# Patient Record
Sex: MALE | Race: WHITE | NOT HISPANIC OR LATINO | Employment: OTHER | URBAN - METROPOLITAN AREA
[De-identification: names, ages, dates, MRNs, and addresses within clinical notes are randomized per-mention and may not be internally consistent; named-entity substitution may affect disease eponyms.]

---

## 2017-01-26 ENCOUNTER — PATIENT OUTREACH (OUTPATIENT)
Dept: ADMINISTRATIVE | Facility: HOSPITAL | Age: 62
End: 2017-01-26

## 2017-02-09 ENCOUNTER — OFFICE VISIT (OUTPATIENT)
Dept: FAMILY MEDICINE | Facility: CLINIC | Age: 62
End: 2017-02-09
Payer: COMMERCIAL

## 2017-02-09 VITALS
HEART RATE: 69 BPM | DIASTOLIC BLOOD PRESSURE: 74 MMHG | BODY MASS INDEX: 29.5 KG/M2 | SYSTOLIC BLOOD PRESSURE: 122 MMHG | OXYGEN SATURATION: 96 % | WEIGHT: 217.81 LBS | HEIGHT: 72 IN

## 2017-02-09 DIAGNOSIS — Z00.00 ANNUAL PHYSICAL EXAM: Primary | ICD-10-CM

## 2017-02-09 DIAGNOSIS — I10 BENIGN HYPERTENSION: ICD-10-CM

## 2017-02-09 DIAGNOSIS — Z23 NEED FOR VACCINATION FOR STREP PNEUMONIAE + INFLUENZA: ICD-10-CM

## 2017-02-09 DIAGNOSIS — E66.3 OVERWEIGHT: Chronic | ICD-10-CM

## 2017-02-09 DIAGNOSIS — Z91.89 AT RISK FOR CORONARY ARTERY DISEASE: ICD-10-CM

## 2017-02-09 DIAGNOSIS — E78.00 HYPERCHOLESTEREMIA: ICD-10-CM

## 2017-02-09 DIAGNOSIS — I49.9 CARDIAC ARRHYTHMIA, UNSPECIFIED CARDIAC ARRHYTHMIA TYPE: ICD-10-CM

## 2017-02-09 DIAGNOSIS — N40.0 BENIGN NODULAR PROSTATIC HYPERPLASIA WITHOUT LOWER URINARY TRACT SYMPTOMS: ICD-10-CM

## 2017-02-09 DIAGNOSIS — N40.0 BENIGN NON-NODULAR PROSTATIC HYPERPLASIA WITHOUT LOWER URINARY TRACT SYMPTOMS: ICD-10-CM

## 2017-02-09 DIAGNOSIS — R53.83 FATIGUE, UNSPECIFIED TYPE: ICD-10-CM

## 2017-02-09 PROCEDURE — 93010 ELECTROCARDIOGRAM REPORT: CPT | Mod: S$GLB,,, | Performed by: INTERNAL MEDICINE

## 2017-02-09 PROCEDURE — 99396 PREV VISIT EST AGE 40-64: CPT | Mod: 25,S$GLB,, | Performed by: INTERNAL MEDICINE

## 2017-02-09 PROCEDURE — 3074F SYST BP LT 130 MM HG: CPT | Mod: S$GLB,,, | Performed by: INTERNAL MEDICINE

## 2017-02-09 PROCEDURE — 3078F DIAST BP <80 MM HG: CPT | Mod: S$GLB,,, | Performed by: INTERNAL MEDICINE

## 2017-02-09 PROCEDURE — 81003 URINALYSIS AUTO W/O SCOPE: CPT | Mod: PO

## 2017-02-09 PROCEDURE — 90670 PCV13 VACCINE IM: CPT | Mod: S$GLB,,, | Performed by: INTERNAL MEDICINE

## 2017-02-09 PROCEDURE — 99999 PR PBB SHADOW E&M-EST. PATIENT-LVL III: CPT | Mod: PBBFAC,,, | Performed by: INTERNAL MEDICINE

## 2017-02-09 PROCEDURE — 90471 IMMUNIZATION ADMIN: CPT | Mod: S$GLB,,, | Performed by: INTERNAL MEDICINE

## 2017-02-09 PROCEDURE — 93005 ELECTROCARDIOGRAM TRACING: CPT | Mod: S$GLB,,, | Performed by: INTERNAL MEDICINE

## 2017-02-09 RX ORDER — TAMSULOSIN HYDROCHLORIDE 0.4 MG/1
0.4 CAPSULE ORAL DAILY
Qty: 30 CAPSULE | Refills: 11 | Status: SHIPPED | OUTPATIENT
Start: 2017-02-09 | End: 2018-02-14 | Stop reason: SDUPTHER

## 2017-02-09 RX ORDER — MELATONIN 10 MG
20 CAPSULE ORAL NIGHTLY
COMMUNITY
End: 2017-09-11

## 2017-02-09 NOTE — MR AVS SNAPSHOT
[facility]  [address] [facility]  [facility] [address]  Phone: [phone]  Fax: [phone]                  Luke Borden   2017 3:30 PM   Office Visit    Description:  Male : 1955   Provider:  Austin Candelario MD   Department:  [facility]           Reason for Visit     Annual Exam           Diagnoses this Visit        Comments    Annual physical exam    -  Primary     Benign nodular prostatic hyperplasia without lower urinary tract symptoms         Hypercholesteremia         Overweight         Need for vaccination for strep pneumoniae + influenza         At risk for coronary artery disease         Cardiac arrhythmia, unspecified cardiac arrhythmia type         Benign non-nodular prostatic hyperplasia without lower urinary tract symptoms         Benign hypertension         Fatigue, unspecified type                To Do List           Future Appointments        Provider Department Dept Phone    2/10/2017 8:45 AM LAB, [place] [facility] [phone]    2/10/2017 1:30 PM Shan Alicea Jr., MD [facility] [phone]      Goals (5 Years of Data)     None       These Medications        Disp Refills Start End    tamsulosin (FLOMAX) 0.4 mg Cp24 30 capsule 11 2017     Take 1 capsule (0.4 mg total) by mouth once daily. - Oral    Pharmacy: [facility]/pharmacy #[id number] - [place] - [address] Ph #: [phone]         [facility]     [facility] Care [facility] -  Assistance  Registered nurses in the [facility] provide clinical advisement, health education, appointment booking, and other advisory services.  Call for this free service at [phone].             Medications           Message regarding Medications     Verify the changes and/or additions to your medication regime listed below are the same as discussed with your clinician today.  If any of these changes or additions are  incorrect, please notify your healthcare provider.        START taking these NEW medications        Refills    tamsulosin (FLOMAX) 0.4 mg Cp24 11    Sig: Take 1 capsule (0.4 mg total) by mouth once daily.    Class: Normal    Route: Oral           Verify that the below list of medications is an accurate representation of the medications you are currently taking.  If none reported, the list may be blank. If incorrect, please contact your healthcare provider. Carry this list with you in case of emergency.           Current Medications     atorvastatin (LIPITOR) 20 MG tablet Take 1 tablet (20 mg total) by mouth once daily.    ergocalciferol (ERGOCALCIFEROL) 50,000 unit Cap TAKE ONE TWICE WEEKLY X 8 WEEKS. THEN TAKE OTC D 3 2000 UNITS DAILY. W.B.    melatonin 10 mg Cap Take by mouth.    temazepam (RESTORIL) 15 mg Cap TAKE 1 CAPSULE BY MOUTH NIGHTLY AS NEEDED FOR SLEEP    tamsulosin (FLOMAX) 0.4 mg Cp24 Take 1 capsule (0.4 mg total) by mouth once daily.           Clinical Reference Information           Your Vitals Were     BP Pulse Height Weight SpO2 BMI    122/74 69 6' (1.829 m) 98.8 kg (217 lb 13 oz) 96% 29.54 kg/m2      Blood Pressure          Most Recent Value    BP  122/74      Allergies as of 2/9/2017     No Known Allergies      Immunizations Administered on Date of Encounter - 2/9/2017     Name Date Dose VIS Date Route    Pneumococcal Conjugate - 13 Valent 2/9/2017 0.5 mL 11/5/2015 Intramuscular      Orders Placed During Today's Visit      Normal Orders This Visit    Ambulatory consult to Cardiology     Pneumococcal Conjugate Vaccine (13 Valent) (IM)     Urinalysis     Future Labs/Procedures Expected by Expires    CBC auto differential  2/9/2017 2/9/2018    Comprehensive metabolic panel  2/9/2017 2/9/2018    Lipid panel  2/9/2017 2/9/2018    PSA, Screening  2/9/2017 2/9/2018    Testosterone  2/9/2017 2/9/2018    TSH  2/9/2017 2/9/2018    EKG 12-lead  As directed 5/10/2017    Holter monitor - 48 hour  As directed  2/9/2018    Rhythm strip  As directed 2/9/2018      Language Assistance Services     ATTENTION: Language assistance services are available, free of charge. Please call [phone].      ATENCIÓN: Si habroque guevara, tiene a girard disposición servicios gratuitos de asistencia lingüística. Llame al [phone].     CHÚ Ý: N?u b?n nói Ti?ng Vi?t, có các d?ch v? h? tr? ngôn ng? mi?n phí dành cho b?n. G?i s? [phone].         [facility] complies with applicable Federal civil rights laws and does not discriminate on the basis of race, color, national origin, age, disability, or sex.

## 2017-02-10 ENCOUNTER — OFFICE VISIT (OUTPATIENT)
Dept: CARDIOLOGY | Facility: CLINIC | Age: 62
End: 2017-02-10
Payer: COMMERCIAL

## 2017-02-10 ENCOUNTER — LAB VISIT (OUTPATIENT)
Dept: LAB | Facility: HOSPITAL | Age: 62
End: 2017-02-10
Attending: INTERNAL MEDICINE
Payer: COMMERCIAL

## 2017-02-10 ENCOUNTER — TELEPHONE (OUTPATIENT)
Dept: FAMILY MEDICINE | Facility: CLINIC | Age: 62
End: 2017-02-10

## 2017-02-10 VITALS
DIASTOLIC BLOOD PRESSURE: 84 MMHG | BODY MASS INDEX: 28.9 KG/M2 | HEIGHT: 72 IN | SYSTOLIC BLOOD PRESSURE: 123 MMHG | HEART RATE: 103 BPM | WEIGHT: 213.38 LBS

## 2017-02-10 DIAGNOSIS — I10 BENIGN HYPERTENSION: ICD-10-CM

## 2017-02-10 DIAGNOSIS — E55.9 VITAMIN D DEFICIENCY: ICD-10-CM

## 2017-02-10 DIAGNOSIS — R53.83 FATIGUE, UNSPECIFIED TYPE: ICD-10-CM

## 2017-02-10 DIAGNOSIS — E55.9 VITAMIN D DEFICIENCY: Primary | ICD-10-CM

## 2017-02-10 DIAGNOSIS — I49.1 PAC (PREMATURE ATRIAL CONTRACTION): ICD-10-CM

## 2017-02-10 DIAGNOSIS — E78.00 HYPERCHOLESTEREMIA: ICD-10-CM

## 2017-02-10 DIAGNOSIS — R53.83 FATIGUE, UNSPECIFIED TYPE: Primary | Chronic | ICD-10-CM

## 2017-02-10 DIAGNOSIS — Z00.00 ANNUAL PHYSICAL EXAM: ICD-10-CM

## 2017-02-10 LAB
ALBUMIN SERPL BCP-MCNC: 3.8 G/DL
ALP SERPL-CCNC: 74 U/L
ALT SERPL W/O P-5'-P-CCNC: 22 U/L
ANION GAP SERPL CALC-SCNC: 8 MMOL/L
AST SERPL-CCNC: 25 U/L
BASOPHILS # BLD AUTO: 0.04 K/UL
BASOPHILS NFR BLD: 0.4 %
BILIRUB SERPL-MCNC: 0.9 MG/DL
BILIRUB UR QL STRIP: NEGATIVE
BUN SERPL-MCNC: 20 MG/DL
CALCIUM SERPL-MCNC: 9.6 MG/DL
CHLORIDE SERPL-SCNC: 105 MMOL/L
CHOLEST/HDLC SERPL: 2.5 {RATIO}
CLARITY UR: CLEAR
CO2 SERPL-SCNC: 27 MMOL/L
COLOR UR: YELLOW
COMPLEXED PSA SERPL-MCNC: 1.3 NG/ML
CREAT SERPL-MCNC: 1.1 MG/DL
DIFFERENTIAL METHOD: ABNORMAL
EOSINOPHIL # BLD AUTO: 0.4 K/UL
EOSINOPHIL NFR BLD: 3.9 %
ERYTHROCYTE [DISTWIDTH] IN BLOOD BY AUTOMATED COUNT: 13.3 %
EST. GFR  (AFRICAN AMERICAN): >60 ML/MIN/1.73 M^2
EST. GFR  (NON AFRICAN AMERICAN): >60 ML/MIN/1.73 M^2
GLUCOSE SERPL-MCNC: 99 MG/DL
GLUCOSE UR QL STRIP: NEGATIVE
HCT VFR BLD AUTO: 41.9 %
HDL/CHOLESTEROL RATIO: 39.6 %
HDLC SERPL-MCNC: 169 MG/DL
HDLC SERPL-MCNC: 67 MG/DL
HGB BLD-MCNC: 14 G/DL
HGB UR QL STRIP: ABNORMAL
KETONES UR QL STRIP: ABNORMAL
LDLC SERPL CALC-MCNC: 85.4 MG/DL
LEUKOCYTE ESTERASE UR QL STRIP: NEGATIVE
LYMPHOCYTES # BLD AUTO: 1.3 K/UL
LYMPHOCYTES NFR BLD: 15 %
MCH RBC QN AUTO: 32.9 PG
MCHC RBC AUTO-ENTMCNC: 33.4 %
MCV RBC AUTO: 98 FL
MONOCYTES # BLD AUTO: 1 K/UL
MONOCYTES NFR BLD: 11.1 %
NEUTROPHILS # BLD AUTO: 6.2 K/UL
NEUTROPHILS NFR BLD: 69.5 %
NITRITE UR QL STRIP: NEGATIVE
NONHDLC SERPL-MCNC: 102 MG/DL
PH UR STRIP: 6 [PH] (ref 5–8)
PLATELET # BLD AUTO: 268 K/UL
PMV BLD AUTO: 11.6 FL
POTASSIUM SERPL-SCNC: 4.5 MMOL/L
PROT SERPL-MCNC: 7 G/DL
PROT UR QL STRIP: NEGATIVE
RBC # BLD AUTO: 4.26 M/UL
SODIUM SERPL-SCNC: 140 MMOL/L
SP GR UR STRIP: 1.02 (ref 1–1.03)
TESTOST SERPL-MCNC: 480 NG/DL
TRIGL SERPL-MCNC: 83 MG/DL
TSH SERPL DL<=0.005 MIU/L-ACNC: 1.5 UIU/ML
URN SPEC COLLECT METH UR: ABNORMAL
WBC # BLD AUTO: 8.89 K/UL

## 2017-02-10 PROCEDURE — 3079F DIAST BP 80-89 MM HG: CPT | Mod: S$GLB,,, | Performed by: INTERNAL MEDICINE

## 2017-02-10 PROCEDURE — 85025 COMPLETE CBC W/AUTO DIFF WBC: CPT

## 2017-02-10 PROCEDURE — 99204 OFFICE O/P NEW MOD 45 MIN: CPT | Mod: S$GLB,,, | Performed by: INTERNAL MEDICINE

## 2017-02-10 PROCEDURE — 84443 ASSAY THYROID STIM HORMONE: CPT

## 2017-02-10 PROCEDURE — 84153 ASSAY OF PSA TOTAL: CPT

## 2017-02-10 PROCEDURE — 80061 LIPID PANEL: CPT

## 2017-02-10 PROCEDURE — 36415 COLL VENOUS BLD VENIPUNCTURE: CPT | Mod: PO

## 2017-02-10 PROCEDURE — 3074F SYST BP LT 130 MM HG: CPT | Mod: S$GLB,,, | Performed by: INTERNAL MEDICINE

## 2017-02-10 PROCEDURE — 80053 COMPREHEN METABOLIC PANEL: CPT

## 2017-02-10 PROCEDURE — 99999 PR PBB SHADOW E&M-EST. PATIENT-LVL III: CPT | Mod: PBBFAC,,, | Performed by: INTERNAL MEDICINE

## 2017-02-10 PROCEDURE — 84403 ASSAY OF TOTAL TESTOSTERONE: CPT

## 2017-02-10 RX ORDER — METOPROLOL SUCCINATE 25 MG/1
50 TABLET, EXTENDED RELEASE ORAL DAILY
Qty: 90 TABLET | Refills: 3 | Status: SHIPPED | OUTPATIENT
Start: 2017-02-10 | End: 2017-03-14 | Stop reason: SDUPTHER

## 2017-02-10 NOTE — TELEPHONE ENCOUNTER
----- Message from Caroline Martin sent at 2/9/2017  7:03 PM CST -----  Regarding: lab orders for 02-  THis patient saw Dr Candelario on 02- and said he is to have a Vitamin D test added to his lab work. He is scheduled for tomorrow morning 02-. He stated that Dr Candelario told him he would order it.

## 2017-02-10 NOTE — LETTER
February 10, 2017      Austin Candelario MD  [address] [place] [address]           [facility]  [address] [place] [address]  Phone: [phone]          Patient: Luke Borden   MR Number: [id number]   YOB: 1955   Date of Visit: 2/10/2017       Dear Dr. Austin Candelario:    Thank you for referring Luke Borden to me for evaluation. Attached you will find relevant portions of my assessment and plan of care.    If you have questions, please do not hesitate to call me. I look forward to following Luke Borden along with you.    Sincerely,    Shan Alicea Jr., MD    Enclosure  CC:  No Recipients    If you would like to receive this communication electronically, please contact [email or web address] or [phone] to request more information on [facility] access.    For providers and/or their staff who would like to refer a patient to [facility], please contact us through our one-stop-shop provider referral line, [facility], at [phone].    If you feel you have received this communication in error or would no longer like to receive these types of communications, please e-mail [email or web address]

## 2017-02-10 NOTE — PROGRESS NOTES
Subjective:    Patient ID:  Luke Borden is a 61 y.o. male who presents for evaluation of Consult (Ref by Dr. Candelario - Abnormal EKG  )      HPI61 yo WM with no known heart disease who on routine exam found to have irregular heart beat. EKG shows frequent PAC's. Other than some fatigue he has no symptoms. Denies chest pain, SOB, or edema Denies palpitations, weak spells, and syncope Exercises regularly.    Review of Systems   Constitution: Positive for malaise/fatigue. Negative for decreased appetite, fever, weakness, weight gain and weight loss.   HENT: Negative for headaches, hearing loss and nosebleeds.    Eyes: Negative for visual disturbance.   Cardiovascular: Negative for chest pain, claudication, cyanosis, dyspnea on exertion, irregular heartbeat, leg swelling, near-syncope, orthopnea, palpitations, paroxysmal nocturnal dyspnea and syncope.   Respiratory: Negative for cough, hemoptysis, shortness of breath, sleep disturbances due to breathing, snoring and wheezing.    Endocrine: Negative for cold intolerance, heat intolerance, polydipsia and polyuria.   Hematologic/Lymphatic: Negative for adenopathy and bleeding problem. Does not bruise/bleed easily.   Skin: Negative for color change, itching, poor wound healing, rash and suspicious lesions.   Musculoskeletal: Negative for arthritis, back pain, falls, joint pain, joint swelling, muscle cramps, muscle weakness and myalgias.   Gastrointestinal: Negative for bloating, abdominal pain, change in bowel habit, constipation, flatus, heartburn, hematemesis, hematochezia, hemorrhoids, jaundice, melena, nausea and vomiting.   Genitourinary: Negative for bladder incontinence, decreased libido, frequency, hematuria, hesitancy and urgency.   Neurological: Negative for brief paralysis, difficulty with concentration, excessive daytime sleepiness, dizziness, focal weakness, light-headedness, loss of balance, numbness and vertigo.   Psychiatric/Behavioral: Negative for  altered mental status, depression and memory loss. The patient does not have insomnia and is not nervous/anxious.    Allergic/Immunologic: Negative for environmental allergies, hives and persistent infections.        Objective:    Physical Exam   Constitutional: He is oriented to person, place, and time. He appears well-developed and well-nourished. No distress.   /84 (BP Location: Right arm, Patient Position: Sitting, BP Method: Automatic)  Pulse 103  Ht 6' (1.829 m)  Wt 96.8 kg (213 lb 6.5 oz)  BMI 28.94 kg/m2     HENT:   Head: Normocephalic and atraumatic.   Eyes: Conjunctivae and lids are normal. Pupils are equal, round, and reactive to light. Right eye exhibits no discharge. No scleral icterus.   Neck: Normal range of motion. Neck supple. No JVD present. No tracheal deviation present. No thyromegaly present.   Cardiovascular: Normal rate, S1 normal, S2 normal, normal heart sounds and intact distal pulses.  A regularly irregular rhythm present. Frequent extrasystoles are present. Exam reveals no gallop and no friction rub.    No murmur heard.  Pulses:       Carotid pulses are 2+ on the right side, and 2+ on the left side.       Radial pulses are 2+ on the right side, and 2+ on the left side.        Femoral pulses are 2+ on the right side, and 2+ on the left side.       Popliteal pulses are 2+ on the right side, and 2+ on the left side.        Dorsalis pedis pulses are 2+ on the right side, and 2+ on the left side.        Posterior tibial pulses are 2+ on the right side, and 2+ on the left side.   Pulmonary/Chest: Effort normal and breath sounds normal. No respiratory distress. He has no wheezes. He has no rales. He exhibits no tenderness.   Abdominal: Soft. Bowel sounds are normal. He exhibits no distension and no mass. There is no hepatosplenomegaly or hepatomegaly. There is no tenderness. There is no rebound and no guarding.   Musculoskeletal: Normal range of motion. He exhibits no edema or tenderness.    Lymphadenopathy:     He has no cervical adenopathy.   Neurological: He is alert and oriented to person, place, and time. He has normal reflexes. No cranial nerve deficit. Coordination normal.   Skin: Skin is warm and dry. No rash noted. He is not diaphoretic. No erythema. No pallor.   Psychiatric: He has a normal mood and affect. His speech is normal and behavior is normal. Judgment and thought content normal. Cognition and memory are normal.       Patient had 0 CACS in the past few years.  Assessment:       1. Fatigue, unspecified type    2. Hypercholesteremia    3. PAC (premature atrial contraction)         Plan:    Although asymptomatic because of the frequency of the PAC's will add metoprolol 25mg daily and check echo  Orders Placed This Encounter   Procedures    2D echo with color flow doppler     Return in about 3 months (around 5/10/2017).

## 2017-02-10 NOTE — PROGRESS NOTES
A 61-year-old white male who presents today for his routine annual examination   and evaluation.  For continuity and review, see previous office visit on   02/23/2016.    During the interim, the patient has had no ER visits or hospitalizations.  He   was relatively asymptomatic and has lost in the recent past, a significant   amount of weight and on careful evaluation and review since 02/03/2016, has   gained approximately 20 pounds back.  Precautions and counseling given again.    On careful review of his previous problems, indicate no significant change in   symptoms.  He is adhering to a lipid-lowering diet and lipid-lowering agents.    He is due his comprehensive lab, which will be ordered today fasting.  On   careful review from last year, his lipid panel appear to be acceptable and   responsive to his diet and medication with a TC of 182, TG 82, his HDL was 71   and LDL was 94.6.  We will reevaluate again and be in contact regarding the   results.    Sleep disorder.  The patient takes on majority of the times, Restoril 50 mg at   bedtime and marked improvement in quality of life and prevention of daytime   drowsiness, weakness and lethargy at times.  Does not exercise on a regular   basis and outlined again aerobic and light weight program again for consistent   conditioning and prevention of fall now and in the future.    REVIEW OF SYSTEMS:  GENERAL:  A complete review of systems otherwise unremarkable.  There has been   no evidence of any problems of fever, chills or sweats.  HEENT:  The patient has had his eye exam on a routine basis with Dr. Lukas Santillan, his ophthalmologist outside of the clinic and no complications or   problems noted.  RESPIRATORY:  No wheeze, cough or shortness of breath.  HEART:  No angina or pain.  No palpitation, weakness or syncope or edema.  GASTROINTESTINAL:  Appetite good.  BMs are normal.  GENITOURINARY:  No dysuria, hematuria or renal colic.  NEUROLOGICAL:  No evidence of  altered mental state, memory problems, motor or   sensory abnormalities, gait problems, falls, etc. or movement disorder or   depression by careful history.    IMMUNIZATIONS:  Reviewed.  The patient has had his flu shot and zoster outside   of the clinic and is due his pneumococcal conjugate 13 and given today prior to   departure.    PHYSICAL EXAMINATION:  GENERAL:  Reveals a well-developed, well-nourished overweight white male.  VITAL SIGNS:  See vital signs and measurements.  ARTERIES AND VEINS:  Good peripheral pulsation.  Normal carotid upstroke and   amplitude.  There was no edema or phlebitis.  SKIN:  Warm and dry.  No lesions or exanthem.  NODES:  None felt in and around the neck, submandibular or supraclavicular   region.  NEUROLOGICAL:  The patient is oriented, cooperative.  No evidence of a mood or   movement disorder.  No blunted affect and normal thought processes.  Motor tone   and strength fairly good and equal bilaterally.  Cranial nerves grossly intact   II-XII.  Gait normal.  No fall risk observed.  HEENT:  Pupils are equal and reactive to light and accommodation.  External eye   movements are intact.  NECK:  No masses or thyroid.  LUNGS:  Clear.  Distant tones.  HEART:  There was a regular irregular rhythm type noted at the present time and   again relatively asymptomatic.  No evidence of cardiomegaly, S3 or further   abnormality.  The patient apparently has had no significant arrhythmias in the   past.  ABDOMINAL:  Soft.  No localized pain, tenderness or organomegaly.  BACK:  No paravertebral or vertebral pain.    IMPRESSION:  At this time:  1.  Cardiac arrhythmia.  EKG and rhythm strip indicated probably   supraventricular tachycardia with a possible AV block.  Precautions given at the   present time and explained his present condition as stable, but requires   further cardiac evaluation, which a consultation was obtained tomorrow and the   EKG and rhythm strip was sent for Cardiology to review  prior to the consult.    Precautions to call if any cardiac symptoms occur, which have not been noted by   the patient.  2.  The patient has a sleep disorder, stable and will continue with his gradual   exercise activity program and try to obtain his sleep medicine on a p.r.n.   basis.  3.  Overweight state.  Strict precautions given again regarding his recent   weight gain and for his wellness and preventive measures in the future to   continue to monitor every day and reduce his BMI around 25.  4.  Hypercholesterolemia.  We will repeat again his lipid panel with   comprehensive lab to reevaluate his response to diet and medication.  5.  New complaints indicate the patient has nocturia x3 to 4 and definitely   interfering with quality of life and sleep patterns and we will definitely order   his PSA.  No dysuria, hematuria or evidence of any urinary symptoms and will   start on Flomax 0.4 mg and obtain response.    Prior to departure, he received his Prevnar 13, ordered comprehensive lab and   cardiac consultation as noted above for tomorrow.  Will return in 6 weeks and   determine his response to Flomax, cardiac consultation and recommendations and   review of all of his laboratory work.  The patient's visit was approximately   45-50 minutes, greater than one-half in counseling, direction, explanation of   his relatively new asymptomatic cardiac arrhythmia and need for cardiac   consultation and ordering and monitoring his condition for a brief period of   time.      KEKE/JACQUI  dd: 02/10/2017 12:28:30 (CST)  td: 02/10/2017 13:40:07 (CST)  Doc ID   #[id number]  Job ID #[id number]    CC:    This office note has been dictated.

## 2017-02-10 NOTE — PATIENT INSTRUCTIONS
Reviewed recent lab,discussed  risk factors, and recommended to continue his wellness program, exercises, diet, meds, and  wt. Control. See dictation.[email or web address]

## 2017-02-13 ENCOUNTER — TELEPHONE (OUTPATIENT)
Dept: FAMILY MEDICINE | Facility: CLINIC | Age: 62
End: 2017-02-13

## 2017-02-13 NOTE — TELEPHONE ENCOUNTER
----- Message from Austin Candelario MD sent at 2/13/2017 12:26 PM CST -----  LAB REVIEWED AND ACCEPTABLE. [place]

## 2017-02-13 NOTE — TELEPHONE ENCOUNTER
Phoned pt in regards to lab results per Dr Candelario's instructions. Pt voiced understanding for all instructions. CLC

## 2017-02-15 ENCOUNTER — TELEPHONE (OUTPATIENT)
Dept: FAMILY MEDICINE | Facility: CLINIC | Age: 62
End: 2017-02-15

## 2017-02-15 DIAGNOSIS — R31.9 HEMATURIA: Primary | ICD-10-CM

## 2017-02-15 NOTE — TELEPHONE ENCOUNTER
Message reviewed.  Contact the patient and tell him his not unusual usual to detect ketones when his fasting are exercise fatigue experienced.  Also, there a minimal blood is seen in urine exams without symptoms.  However, as a precaution I would repeat the urine with microscopic exam.  WB

## 2017-02-15 NOTE — TELEPHONE ENCOUNTER
----- Message from Yun Andrade sent at 2/15/2017  2:01 PM CST -----  Asking to discuss test results / call pt at [phone]

## 2017-02-16 ENCOUNTER — LAB VISIT (OUTPATIENT)
Dept: LAB | Facility: HOSPITAL | Age: 62
End: 2017-02-16
Attending: INTERNAL MEDICINE
Payer: COMMERCIAL

## 2017-02-16 ENCOUNTER — TELEPHONE (OUTPATIENT)
Dept: CARDIOLOGY | Facility: CLINIC | Age: 62
End: 2017-02-16

## 2017-02-16 ENCOUNTER — CLINICAL SUPPORT (OUTPATIENT)
Dept: CARDIOLOGY | Facility: CLINIC | Age: 62
End: 2017-02-16
Payer: COMMERCIAL

## 2017-02-16 DIAGNOSIS — I47.19 ATRIAL TACHYCARDIA: Primary | ICD-10-CM

## 2017-02-16 DIAGNOSIS — R53.83 FATIGUE, UNSPECIFIED TYPE: Chronic | ICD-10-CM

## 2017-02-16 DIAGNOSIS — E78.00 HYPERCHOLESTEREMIA: ICD-10-CM

## 2017-02-16 DIAGNOSIS — I34.0 NONRHEUMATIC MITRAL VALVE INSUFFICIENCY: ICD-10-CM

## 2017-02-16 DIAGNOSIS — I49.1 PAC (PREMATURE ATRIAL CONTRACTION): ICD-10-CM

## 2017-02-16 DIAGNOSIS — R31.9 HEMATURIA: ICD-10-CM

## 2017-02-16 DIAGNOSIS — R00.0 TACHYCARDIA: Primary | ICD-10-CM

## 2017-02-16 LAB
BILIRUB UR QL STRIP: NEGATIVE
CLARITY UR: CLEAR
COLOR UR: YELLOW
ESTIMATED PA SYSTOLIC PRESSURE: 34.42
GLUCOSE UR QL STRIP: NEGATIVE
HGB UR QL STRIP: NEGATIVE
KETONES UR QL STRIP: NEGATIVE
LEUKOCYTE ESTERASE UR QL STRIP: NEGATIVE
MITRAL VALVE REGURGITATION: ABNORMAL
NITRITE UR QL STRIP: NEGATIVE
PH UR STRIP: 6 [PH] (ref 5–8)
PROT UR QL STRIP: NEGATIVE
RETIRED EF AND QEF - SEE NOTES: 30 (ref 55–65)
SP GR UR STRIP: 1.01 (ref 1–1.03)
TRICUSPID VALVE REGURGITATION: ABNORMAL
URN SPEC COLLECT METH UR: NORMAL

## 2017-02-16 PROCEDURE — 93306 TTE W/DOPPLER COMPLETE: CPT | Mod: S$GLB,,, | Performed by: INTERNAL MEDICINE

## 2017-02-16 PROCEDURE — 81003 URINALYSIS AUTO W/O SCOPE: CPT | Mod: PO

## 2017-02-16 NOTE — TELEPHONE ENCOUNTER
Spoke to patient. Informed patient of Advice per Dr. Candelario. Patient verbalized understanding.

## 2017-02-17 ENCOUNTER — CLINICAL SUPPORT (OUTPATIENT)
Dept: CARDIOLOGY | Facility: CLINIC | Age: 62
End: 2017-02-17
Payer: COMMERCIAL

## 2017-02-17 DIAGNOSIS — R00.0 TACHYCARDIA: ICD-10-CM

## 2017-02-17 PROCEDURE — 93224 XTRNL ECG REC UP TO 48 HRS: CPT | Mod: S$GLB,,, | Performed by: INTERNAL MEDICINE

## 2017-02-22 ENCOUNTER — TELEPHONE (OUTPATIENT)
Dept: CARDIOLOGY | Facility: CLINIC | Age: 62
End: 2017-02-22

## 2017-02-27 ENCOUNTER — TELEPHONE (OUTPATIENT)
Dept: FAMILY MEDICINE | Facility: CLINIC | Age: 62
End: 2017-02-27

## 2017-02-27 ENCOUNTER — TELEPHONE (OUTPATIENT)
Dept: CARDIOLOGY | Facility: CLINIC | Age: 62
End: 2017-02-27

## 2017-02-27 NOTE — TELEPHONE ENCOUNTER
----- Message from Shan Alicea Jr., MD sent at 2/23/2017  9:23 PM CST -----  Contact: luke   Patient is asymptomatic. We are going to treat with beta blockers and repeat echo in 3 months  ----- Message -----     From: Josefa Kulkarni MA     Sent: 2/23/2017  11:09 AM       To: Shan Alicea Jr., MD     Can you look over echo results on patient and advise on the results? Thanks!  ----- Message -----     From: Kaye Kumar     Sent: 2/23/2017  10:49 AM       To: Deanne Bhandari    Echo results   Call back

## 2017-02-27 NOTE — TELEPHONE ENCOUNTER
Called patient and left voice mail, Dr. Alicea had spoke with him last week regarding all his test results. He is to treat with beta blockers and repeat echo in 3 months.

## 2017-02-27 NOTE — TELEPHONE ENCOUNTER
----- Message from Kaye Kumar sent at 2/27/2017 12:59 PM CST -----  Contact: luke Osmans information from last visit   Call back

## 2017-03-01 ENCOUNTER — TELEPHONE (OUTPATIENT)
Dept: FAMILY MEDICINE | Facility: CLINIC | Age: 62
End: 2017-03-01

## 2017-03-01 DIAGNOSIS — R00.0 TACHYARRHYTHMIA: Primary | ICD-10-CM

## 2017-03-01 NOTE — TELEPHONE ENCOUNTER
Pt state he would like to get a second opinion for Cardiology and states the cardiologist he saw after seeing you was not the dr you had recommended and would like to get a second opinion from the dr you had originally requested pt see. Please advise thanks

## 2017-03-01 NOTE — TELEPHONE ENCOUNTER
----- Message from Robinson Luis sent at 2/27/2017  4:47 PM CST -----  Contact: Self  Placed call to Pod.  Returning the call.  Please call back. [phone].  Thank you

## 2017-03-01 NOTE — TELEPHONE ENCOUNTER
Message reviewed.  Will consult Dr. Coleman as recommended for a second opinion for his cardiac arrhythmia.

## 2017-03-02 NOTE — TELEPHONE ENCOUNTER
----- Message from Koko Sharma sent at 3/2/2017  1:08 PM CST -----  Contact: Patient  Patient returning a call. Please call back at [phone] thanks,

## 2017-03-14 ENCOUNTER — OFFICE VISIT (OUTPATIENT)
Dept: CARDIOLOGY | Facility: CLINIC | Age: 62
End: 2017-03-14
Payer: COMMERCIAL

## 2017-03-14 VITALS
WEIGHT: 207 LBS | BODY MASS INDEX: 28.04 KG/M2 | HEIGHT: 72 IN | HEART RATE: 71 BPM | DIASTOLIC BLOOD PRESSURE: 96 MMHG | SYSTOLIC BLOOD PRESSURE: 135 MMHG

## 2017-03-14 DIAGNOSIS — I49.1 PAC (PREMATURE ATRIAL CONTRACTION): Primary | ICD-10-CM

## 2017-03-14 DIAGNOSIS — E78.00 HYPERCHOLESTEREMIA: ICD-10-CM

## 2017-03-14 DIAGNOSIS — Z86.79 HISTORY OF CARDIOMYOPATHY: ICD-10-CM

## 2017-03-14 PROCEDURE — 99214 OFFICE O/P EST MOD 30 MIN: CPT | Mod: S$GLB,,, | Performed by: INTERNAL MEDICINE

## 2017-03-14 PROCEDURE — 99999 PR PBB SHADOW E&M-EST. PATIENT-LVL III: CPT | Mod: PBBFAC,,, | Performed by: INTERNAL MEDICINE

## 2017-03-14 PROCEDURE — 1160F RVW MEDS BY RX/DR IN RCRD: CPT | Mod: S$GLB,,, | Performed by: INTERNAL MEDICINE

## 2017-03-14 PROCEDURE — 3080F DIAST BP >= 90 MM HG: CPT | Mod: S$GLB,,, | Performed by: INTERNAL MEDICINE

## 2017-03-14 PROCEDURE — 3075F SYST BP GE 130 - 139MM HG: CPT | Mod: S$GLB,,, | Performed by: INTERNAL MEDICINE

## 2017-03-14 RX ORDER — METOPROLOL SUCCINATE 50 MG/1
TABLET, EXTENDED RELEASE ORAL
Qty: 90 TABLET | Refills: 5 | Status: ON HOLD | OUTPATIENT
Start: 2017-03-14 | End: 2017-05-25 | Stop reason: HOSPADM

## 2017-03-14 NOTE — PROGRESS NOTES
Subjective:    Patient ID:  Luke Borden is a 61 y.o. male who presents for follow-up of Consult (second opinion - PVC's; Review echo and holter )      HPI   Here to establish care/get second opinion (Dr Alicea) regarding PAC's, decreased EF.  Patients states is doing well no chest pain, SOB or change in exertional tolerence. Patient is exercising regularly with out symptoms.Feels skipped beats but no sx's.       Review of Systems   Constitution: Negative for malaise/fatigue.   Eyes: Negative for blurred vision.   Cardiovascular: Negative for chest pain, claudication, cyanosis, dyspnea on exertion, irregular heartbeat, leg swelling, near-syncope, orthopnea, palpitations, paroxysmal nocturnal dyspnea and syncope.   Respiratory: Negative for cough and shortness of breath.    Hematologic/Lymphatic: Does not bruise/bleed easily.   Musculoskeletal: Negative for back pain, falls, joint pain, muscle cramps, muscle weakness and myalgias.   Gastrointestinal: Negative for abdominal pain, change in bowel habit, nausea and vomiting.   Genitourinary: Negative for urgency.   Neurological: Negative for dizziness, focal weakness and light-headedness.        Objective:    Physical Exam   Constitutional: He is oriented to person, place, and time. He appears well-developed and well-nourished. He is cooperative.   HENT:   Head: Normocephalic and atraumatic.   Eyes: Conjunctivae are normal. Right eye exhibits no exudate. Left eye exhibits no exudate.   Neck: Normal range of motion. Neck supple. Normal carotid pulses and no JVD present. Carotid bruit is not present. No thyromegaly present.   Cardiovascular: Normal rate, regular rhythm, normal heart sounds and intact distal pulses.    Pulses:       Carotid pulses are 2+ on the right side, and 2+ on the left side.       Radial pulses are 2+ on the right side, and 2+ on the left side.        Dorsalis pedis pulses are 2+ on the right side, and 2+ on the left side.        Posterior  tibial pulses are 2+ on the right side, and 2+ on the left side.   Pulmonary/Chest: Effort normal and breath sounds normal.   Abdominal: Soft. Bowel sounds are normal.   Musculoskeletal: Normal range of motion. He exhibits no edema.   Neurological: He is alert and oriented to person, place, and time. Gait normal.   Skin: Skin is warm, dry and intact. No cyanosis. Nails show no clubbing.   Psychiatric: He has a normal mood and affect. His speech is normal and behavior is normal. Judgment and thought content normal.   Nursing note and vitals reviewed.            ..    Chemistry        Component Value Date/Time     02/10/2017 0926    K 4.5 02/10/2017 0926     02/10/2017 0926    CO2 27 02/10/2017 0926    BUN 20 02/10/2017 0926    CREATININE 1.1 02/10/2017 0926    GLU 99 02/10/2017 0926        Component Value Date/Time    CALCIUM 9.6 02/10/2017 0926    ALKPHOS 74 02/10/2017 0926    AST 25 02/10/2017 0926    ALT 22 02/10/2017 0926    BILITOT 0.9 02/10/2017 0926            ..  Lab Results   Component Value Date    CHOL 169 02/10/2017    CHOL 182 02/06/2016    CHOL 219 (H) 11/03/2014     Lab Results   Component Value Date    HDL 67 02/10/2017    HDL 71 02/06/2016    HDL 81 (H) 11/03/2014     Lab Results   Component Value Date    LDLCALC 85.4 02/10/2017    LDLCALC 94.6 02/06/2016    LDLCALC 106.8 11/03/2014     Lab Results   Component Value Date    TRIG 83 02/10/2017    TRIG 82 02/06/2016    TRIG 156 (H) 11/03/2014     Lab Results   Component Value Date    CHOLHDL 39.6 02/10/2017    CHOLHDL 39.0 02/06/2016    CHOLHDL 37.0 11/03/2014     ..  Lab Results   Component Value Date    WBC 8.89 02/10/2017    HGB 14.0 02/10/2017    HCT 41.9 02/10/2017    MCV 98 02/10/2017     02/10/2017       Test(s) Reviewed  I have reviewed the following in detail:  [] Stress test   [] Angiography   [x] Echocardiogram   [x] Labs   [] Other:       Assessment:         ICD-10-CM ICD-9-CM   1. PAC (premature atrial contraction) I49.1  427.61   2. Hypercholesteremia E78.00 272.0   3. History of cardiomyopathy Z86.79 V12.59     Problem List Items Addressed This Visit     History of cardiomyopathy    Overview     2/2017 EF 30 %         Hypercholesteremia    PAC (premature atrial contraction) - Primary           Plan:           Return to clinic 2 months   Aerobic exercise 5x's/wk. Heart healthy diet and risk factor modification.    See labs and med orders.  Increase BB repeat holter/echo if still low EF will need LHC and then referral to EP.

## 2017-03-14 NOTE — MR AVS SNAPSHOT
[facility]  [address]  [facility] [address]  Phone: [phone]                  Luke Borden   3/14/2017 10:00 AM   Office Visit    Description:  Male : 1955   Provider:  Rusty Coleman MD   Department:  [facility]           Reason for Visit     Consult           Diagnoses this Visit        Comments    PAC (premature atrial contraction)    -  Primary     Hypercholesteremia         History of cardiomyopathy                To Do List           Goals (5 Years of Data)     None      Follow-Up and Disposition     Return in about 2 months (around 2017).       These Medications        Disp Refills Start End    metoprolol succinate (TOPROL-XL) 50 MG 24 hr tablet 90 tablet 5 3/14/2017     11/2 tab QAm    Pharmacy: [facility] #[id number] - [place] - [address] Ph #: [phone]       rivaroxaban (XARELTO) 20 mg Tab 30 tablet 12 3/14/2017     Take 1 tablet (20 mg total) by mouth every evening. - Oral    Pharmacy: [facility] #[id number] - [place], [place] - [address] Ph #: [phone]         [facility]     [facility] -  Assistance  Registered nurses in the [facility] provide clinical advisement, health education, appointment booking, and other advisory services.  Call for this free service at [phone].             Medications           Message regarding Medications     Verify the changes and/or additions to your medication regime listed below are the same as discussed with your clinician today.  If any of these changes or additions are incorrect, please notify your healthcare provider.        START taking these NEW medications        Refills    rivaroxaban (XARELTO) 20 mg Tab 12    Sig: Take 1 tablet (20 mg total) by mouth every evening.    Class: Normal    Route: Oral      CHANGE how you are taking these medications     Start Taking Instead of    metoprolol succinate (TOPROL-XL) 50 MG 24 hr tablet metoprolol  succinate (TOPROL-XL) 25 MG 24 hr tablet    Dosage:  11/2 tab QAm Dosage:  Take 2 tablets (50 mg total) by mouth once daily.    Reason for Change:  Reorder            Verify that the below list of medications is an accurate representation of the medications you are currently taking.  If none reported, the list may be blank. If incorrect, please contact your healthcare provider. Carry this list with you in case of emergency.           Current Medications     atorvastatin (LIPITOR) 20 MG tablet Take 1 tablet (20 mg total) by mouth once daily.    ergocalciferol (ERGOCALCIFEROL) 50,000 unit Cap TAKE ONE TWICE WEEKLY X 8 WEEKS. THEN TAKE OTC D 3 2000 UNITS DAILY. W.B.    melatonin 10 mg Cap Take by mouth.    metoprolol succinate (TOPROL-XL) 50 MG 24 hr tablet 11/2 tab QAm    tamsulosin (FLOMAX) 0.4 mg Cp24 Take 1 capsule (0.4 mg total) by mouth once daily.    temazepam (RESTORIL) 15 mg Cap TAKE 1 CAPSULE BY MOUTH NIGHTLY AS NEEDED FOR SLEEP    rivaroxaban (XARELTO) 20 mg Tab Take 1 tablet (20 mg total) by mouth every evening.           Clinical Reference Information           Your Vitals Were     BP Pulse Height Weight BMI    135/96 (BP Location: Left arm, Patient Position: Sitting, BP Method: Automatic) 71 6' (1.829 m) 93.9 kg (207 lb 0.2 oz) 28.08 kg/m2      Blood Pressure          Most Recent Value    BP  (!)  135/96      Allergies as of 3/14/2017     No Known Allergies      Immunizations Administered on Date of Encounter - 3/14/2017     None      Orders Placed During Today's Visit     Future Labs/Procedures Expected by Expires    NM Myocardial Perfusion Spect Multi Exer  3/14/2017 3/14/2018    2D echo with color flow doppler  As directed 3/15/2018    Holter monitor - 48 hour  As directed 3/15/2018    NM Multi Tread Stress Cardiac Component  As directed 3/14/2018      Language Assistance Services     ATTENTION: Language assistance services are available, free of charge. Please call [phone].      ATENCIÓN: Si  habla ismael, tiene a girard disposición servicios gratuitos de asistencia lingüística. Llame al [phone].     CHÚ Ý: N?u b?n nói Ti?ng Vi?t, có các d?ch v? h? tr? ngôn ng? mi?n phí dành cho b?n. G?i s? [phone].         [facility] complies with applicable Federal civil rights laws and does not discriminate on the basis of race, color, national origin, age, disability, or sex.

## 2017-03-15 ENCOUNTER — TELEPHONE (OUTPATIENT)
Dept: CARDIOLOGY | Facility: CLINIC | Age: 62
End: 2017-03-15

## 2017-03-15 NOTE — TELEPHONE ENCOUNTER
"----- Message from Jessica Kinsey sent at 3/15/2017  3:13 PM CDT -----  Contact: wife, Karoline Hylton wants to speak with a nurse regarding if there is a "fit-bit" that can track the patient's heart rate or be able to tell if the patient is in a-fib. Please call back at [phone] (home)     "

## 2017-03-20 ENCOUNTER — TELEPHONE (OUTPATIENT)
Dept: FAMILY MEDICINE | Facility: CLINIC | Age: 62
End: 2017-03-20

## 2017-03-20 NOTE — TELEPHONE ENCOUNTER
Spoke to pt in regards to message below. Scheduled an appt with Jaqueline Curran NP for tomorrow AM. Pt voiced understanding. CLC

## 2017-03-20 NOTE — TELEPHONE ENCOUNTER
----- Message from Robinson Luis sent at 3/20/2017 11:37 AM CDT -----  Contact: Patient  Patient states he is very congested with a cough.  And wheezing.  And would like to be seen today.  Next available appointment is not until tomorrow morning.  Please call [phone].  Thank you

## 2017-03-21 ENCOUNTER — OFFICE VISIT (OUTPATIENT)
Dept: FAMILY MEDICINE | Facility: CLINIC | Age: 62
End: 2017-03-21
Payer: COMMERCIAL

## 2017-03-21 VITALS
HEART RATE: 64 BPM | RESPIRATION RATE: 16 BRPM | SYSTOLIC BLOOD PRESSURE: 100 MMHG | HEIGHT: 72 IN | OXYGEN SATURATION: 97 % | TEMPERATURE: 99 F | DIASTOLIC BLOOD PRESSURE: 60 MMHG | BODY MASS INDEX: 27.83 KG/M2 | WEIGHT: 205.5 LBS

## 2017-03-21 DIAGNOSIS — J30.9 ALLERGIC RHINITIS, UNSPECIFIED ALLERGIC RHINITIS TRIGGER, UNSPECIFIED RHINITIS SEASONALITY: Primary | ICD-10-CM

## 2017-03-21 DIAGNOSIS — R05.9 COUGH IN ADULT: ICD-10-CM

## 2017-03-21 DIAGNOSIS — Z87.09 HISTORY OF ASTHMA: ICD-10-CM

## 2017-03-21 PROCEDURE — 3078F DIAST BP <80 MM HG: CPT | Mod: S$GLB,,, | Performed by: NURSE PRACTITIONER

## 2017-03-21 PROCEDURE — 3074F SYST BP LT 130 MM HG: CPT | Mod: S$GLB,,, | Performed by: NURSE PRACTITIONER

## 2017-03-21 PROCEDURE — 1160F RVW MEDS BY RX/DR IN RCRD: CPT | Mod: S$GLB,,, | Performed by: NURSE PRACTITIONER

## 2017-03-21 PROCEDURE — 99999 PR PBB SHADOW E&M-EST. PATIENT-LVL III: CPT | Mod: PBBFAC,,, | Performed by: NURSE PRACTITIONER

## 2017-03-21 PROCEDURE — 99214 OFFICE O/P EST MOD 30 MIN: CPT | Mod: S$GLB,,, | Performed by: NURSE PRACTITIONER

## 2017-03-21 RX ORDER — MONTELUKAST SODIUM 10 MG/1
10 TABLET ORAL NIGHTLY
Qty: 30 TABLET | Refills: 3 | Status: SHIPPED | OUTPATIENT
Start: 2017-03-21 | End: 2017-04-20

## 2017-03-21 RX ORDER — LEVOCETIRIZINE DIHYDROCHLORIDE 5 MG/1
5 TABLET, FILM COATED ORAL NIGHTLY
Qty: 30 TABLET | Refills: 3 | Status: SHIPPED | OUTPATIENT
Start: 2017-03-21 | End: 2017-09-11

## 2017-03-21 RX ORDER — ALBUTEROL SULFATE 90 UG/1
2 AEROSOL, METERED RESPIRATORY (INHALATION) EVERY 6 HOURS PRN
Qty: 18 G | Refills: 3 | Status: SHIPPED | OUTPATIENT
Start: 2017-03-21 | End: 2017-04-20 | Stop reason: CLARIF

## 2017-03-21 NOTE — MR AVS SNAPSHOT
[facility]  [address]  [place] [address]  Phone: [phone]  Fax: [phone]                  Luke Borden   3/21/2017 8:40 AM   Office Visit    Description:  Male : 1955   Provider:  Jaqueline Curran NP   Department:  [facility]           Reason for Visit     Cough     Nasal Congestion           Diagnoses this Visit        Comments    Allergic rhinitis, unspecified allergic rhinitis trigger, unspecified rhinitis seasonality    -  Primary     History of asthma         Cough in adult                To Do List           Future Appointments        Provider Department Dept Phone    2017 1:00 PM VASCULAR [facility] [phone]    2017 4:00 PM EKG, [facility] [phone]    2017 8:00 AM NM1 [facility] [phone]    2017 10:00 AM STRESS, [facility] [phone]    2017 3:00 PM Rusty Colmean MD [facility] [phone]      Goals (5 Years of Data)     None      Follow-Up and Disposition     Return if symptoms worsen or fail to improve, for temperature or worsening cough.    Follow-up and Disposition History       These Medications        Disp Refills Start End    levocetirizine (XYZAL) 5 MG tablet 30 tablet 3 3/21/2017 3/21/2018    Take 1 tablet (5 mg total) by mouth every evening. antihistamine - Oral    Pharmacy: [facility]/pharmacy #[id number] - [place] - [address] Ph #: [phone]       montelukast (SINGULAIR) 10 mg tablet 30 tablet 3 3/21/2017 2017    Take 1 tablet (10 mg total) by mouth every evening. - Oral    Pharmacy: [facility]/pharmacy #[id number] - [place] - [address] Ph #: [phone]       albuterol 90 mcg/actuation inhaler 18 g 3 3/21/2017 3/21/2018    Inhale 2 puffs into the lungs every 6 (six) hours as needed for Wheezing or Shortness of Breath (or coughing). Rescue - Inhalation    Pharmacy: [facility]/pharmacy #[id number] -  [place] - [address] Ph #: [phone]         [facility] On Call     [facility] Nurse [facility] Line - 24/7 Assistance  Registered nurses in the [facility] provide clinical advisement, health education, appointment booking, and other advisory services.  Call for this free service at [phone].             Medications           Message regarding Medications     Verify the changes and/or additions to your medication regime listed below are the same as discussed with your clinician today.  If any of these changes or additions are incorrect, please notify your healthcare provider.        START taking these NEW medications        Refills    levocetirizine (XYZAL) 5 MG tablet 3    Sig: Take 1 tablet (5 mg total) by mouth every evening. antihistamine    Class: Normal    Route: Oral    montelukast (SINGULAIR) 10 mg tablet 3    Sig: Take 1 tablet (10 mg total) by mouth every evening.    Class: Normal    Route: Oral    albuterol 90 mcg/actuation inhaler 3    Sig: Inhale 2 puffs into the lungs every 6 (six) hours as needed for Wheezing or Shortness of Breath (or coughing). Rescue    Class: Normal    Route: Inhalation      STOP taking these medications     rivaroxaban (XARELTO) 20 mg Tab Take 1 tablet (20 mg total) by mouth every evening.           Verify that the below list of medications is an accurate representation of the medications you are currently taking.  If none reported, the list may be blank. If incorrect, please contact your healthcare provider. Carry this list with you in case of emergency.           Current Medications     atorvastatin (LIPITOR) 20 MG tablet Take 1 tablet (20 mg total) by mouth once daily.    ergocalciferol (ERGOCALCIFEROL) 50,000 unit Cap TAKE ONE TWICE WEEKLY X 8 WEEKS. THEN TAKE OTC D 3 2000 UNITS DAILY. W.B.    melatonin 10 mg Cap Take by mouth.    metoprolol succinate (TOPROL-XL) 50 MG 24 hr tablet 11/2 tab QAm    tamsulosin (FLOMAX) 0.4 mg Cp24 Take 1 capsule (0.4  mg total) by mouth once daily.    albuterol 90 mcg/actuation inhaler Inhale 2 puffs into the lungs every 6 (six) hours as needed for Wheezing or Shortness of Breath (or coughing). Rescue    levocetirizine (XYZAL) 5 MG tablet Take 1 tablet (5 mg total) by mouth every evening. antihistamine    montelukast (SINGULAIR) 10 mg tablet Take 1 tablet (10 mg total) by mouth every evening.    temazepam (RESTORIL) 15 mg Cap TAKE 1 CAPSULE BY MOUTH NIGHTLY AS NEEDED FOR SLEEP           Clinical Reference Information           Your Vitals Were     BP Pulse Temp Resp Height Weight    100/60 64 98.5 °F (36.9 °C) (Oral) 16 6' (1.829 m) 93.2 kg (205 lb 7.5 oz)    SpO2 BMI             97% 27.87 kg/m2         Blood Pressure          Most Recent Value    BP  100/60      Allergies as of 3/21/2017     Not on File      Immunizations Administered on Date of Encounter - 3/21/2017     None      Instructions      Saline nasal spray in shower nightly  Delsym over the counter  RTC if temperature over 100    Allergic Rhinitis  Allergic rhinitis is an allergic reaction that affects the nose, and often the eyes. Its often known as nasal allergies. Nasal allergies are often due to things in the environment that are breathed in. Depending what you are sensitive to, nasal allergies may occur only during certain seasons. Or they may occur year round. Common indoor allergens include house dust mites, mold, cockroaches, and pet dander. Outdoor allergens include pollen from trees, grasses, and weeds.   Symptoms include a drippy, stuffy, and itchy nose. They also include sneezing and red and itchy eyes. You may feel tired more often. Severe allergies may also affect your breathing and trigger a condition called asthma.   Tests can be done to see what allergens are affecting you. You may be referred to an allergy specialist for testing and further evaluation.  Home care  The healthcare provider may prescribe medicines to help relieve allergy  symptoms.   Ask the provider for advice on how to avoid substances that you are allergic to. Below are a few tips for each type of allergen.  Pet dander:  · Do not have pets with fur and feathers.  · If you cannot avoid having a pet, keep it out of your bedroom and off upholstered furniture.  Pollen:  · When pollen counts are high, keep windows of your car and home closed. If possible, use an air conditioner instead.  · Wear a filter mask when mowing or doing yard work.  House dust mites:  · Wash bedding every week in warm water and detergent and dry on a hot setting.  · Cover the mattress, box spring, and pillows with allergy covers.   · If possible, sleep in a room with no carpet, curtains, or upholstered furniture.  Cockroaches:  · Store food in sealed containers.  · Remove garbage from the home promptly.  · Fix water leaks  Mold:  · Keep humidity low by using a dehumidifier or air conditioner. Keep the dehumidifier and air conditioner clean and free of mold.  · Clean moldy areas with bleach and water.  In general:  · Vacuum once or twice a week. If possible, use a vacuum with a high-efficiency particulate air (HEPA) filter.  · Do not smoke. Avoid cigarette smoke. Cigarette smoke is an irritant that can make symptoms worse.  Follow-up care  Follow up as advised by the health care provider or our staff. If you were referred to an allergy specialist, make this appointment promptly.  When to seek medical advice  Call your healthcare provider right away if the following occur:  · Coughing or wheezing  · Fever greater than 100.4°F (38°C)  · Continuing symptoms, new symptoms, or worsening symptoms  Call 911 right away if you have:  · Trouble breathing  · Hives (raised red bumps)  · Severe swelling of the face or severe itching of the eyes or mouth  Date Last Reviewed: 4/26/2015  © 6074-7034 [employer]. [address] [place] [address], [place] [address]. All rights reserved. This information is not intended as a  substitute for professional medical care. Always follow your healthcare professional's instructions.               Language Assistance Services     ATTENTION: Language assistance services are available, free of charge. Please call [phone].      ATENCIÓN: Si angie guevara, tiene a girard disposición servicios gratuitos de asistencia lingüística. Llame al [phone].     CHÚ Ý: N?u b?n nói Ti?ng Vi?t, có các d?ch v? h? tr? ngôn ng? mi?n phí dành cho b?n. G?i s? [phone].         [facility] complies with applicable Federal civil rights laws and does not discriminate on the basis of race, color, national origin, age, disability, or sex.

## 2017-03-21 NOTE — PATIENT INSTRUCTIONS
Saline nasal spray in shower nightly  Delsym over the counter  RTC if temperature over 100    Allergic Rhinitis  Allergic rhinitis is an allergic reaction that affects the nose, and often the eyes. Its often known as nasal allergies. Nasal allergies are often due to things in the environment that are breathed in. Depending what you are sensitive to, nasal allergies may occur only during certain seasons. Or they may occur year round. Common indoor allergens include house dust mites, mold, cockroaches, and pet dander. Outdoor allergens include pollen from trees, grasses, and weeds.   Symptoms include a drippy, stuffy, and itchy nose. They also include sneezing and red and itchy eyes. You may feel tired more often. Severe allergies may also affect your breathing and trigger a condition called asthma.   Tests can be done to see what allergens are affecting you. You may be referred to an allergy specialist for testing and further evaluation.  Home care  The healthcare provider may prescribe medicines to help relieve allergy symptoms.   Ask the provider for advice on how to avoid substances that you are allergic to. Below are a few tips for each type of allergen.  Pet dander:  · Do not have pets with fur and feathers.  · If you cannot avoid having a pet, keep it out of your bedroom and off upholstered furniture.  Pollen:  · When pollen counts are high, keep windows of your car and home closed. If possible, use an air conditioner instead.  · Wear a filter mask when mowing or doing yard work.  House dust mites:  · Wash bedding every week in warm water and detergent and dry on a hot setting.  · Cover the mattress, box spring, and pillows with allergy covers.   · If possible, sleep in a room with no carpet, curtains, or upholstered furniture.  Cockroaches:  · Store food in sealed containers.  · Remove garbage from the home promptly.  · Fix water leaks  Mold:  · Keep humidity low by using a dehumidifier or air conditioner.  Keep the dehumidifier and air conditioner clean and free of mold.  · Clean moldy areas with bleach and water.  In general:  · Vacuum once or twice a week. If possible, use a vacuum with a high-efficiency particulate air (HEPA) filter.  · Do not smoke. Avoid cigarette smoke. Cigarette smoke is an irritant that can make symptoms worse.  Follow-up care  Follow up as advised by the health care provider or our staff. If you were referred to an allergy specialist, make this appointment promptly.  When to seek medical advice  Call your healthcare provider right away if the following occur:  · Coughing or wheezing  · Fever greater than 100.4°F (38°C)  · Continuing symptoms, new symptoms, or worsening symptoms  Call 911 right away if you have:  · Trouble breathing  · Hives (raised red bumps)  · Severe swelling of the face or severe itching of the eyes or mouth  Date Last Reviewed: 4/26/2015  © 6397-9036 [employer]. [address] [place] [address], [place] [address]. All rights reserved. This information is not intended as a substitute for professional medical care. Always follow your healthcare professional's instructions.

## 2017-03-21 NOTE — PROGRESS NOTES
Subjective:       Patient ID: Luke Borden is a 61 y.o. male.    Chief Complaint: Cough and Nasal Congestion  He was last seen in primary care by Dr. Candelario on 02/09/2017. This is his first time seeing me in the clinic.  Cough   This is a new problem. The current episode started in the past 7 days (Saturday). The problem has been waxing and waning. The problem occurs every few minutes. The cough is productive of sputum. Associated symptoms include nasal congestion, postnasal drip and wheezing. Pertinent negatives include no chest pain, chills, ear congestion, ear pain, headaches, heartburn, hemoptysis, myalgias, rash, sore throat, shortness of breath or weight loss. The symptoms are aggravated by other. He has tried OTC cough suppressant for the symptoms. The treatment provided moderate relief. His past medical history is significant for asthma and environmental allergies. There is no history of bronchiectasis, bronchitis or emphysema.     Vitals:    03/21/17 0902   BP: 100/60   Pulse: 64   Resp: 16   Temp: 98.5 °F (36.9 °C)     Review of Systems   Constitutional: Negative for chills and weight loss.   HENT: Positive for postnasal drip. Negative for ear pain and sore throat.    Respiratory: Positive for cough and wheezing. Negative for hemoptysis and shortness of breath.    Cardiovascular: Negative for chest pain.   Gastrointestinal: Negative for heartburn.   Musculoskeletal: Negative for myalgias.   Skin: Negative for rash.   Allergic/Immunologic: Positive for environmental allergies.   Neurological: Negative for headaches.     History of asthma and last problems about 20 years ago,  but no problems in numerous years  Objective:      Physical Exam   Constitutional: He is oriented to person, place, and time. Vital signs are normal. He appears well-developed and well-nourished.   HENT:   Head: Normocephalic and atraumatic.   Right Ear: Hearing, tympanic membrane, external ear and ear canal normal.   Left Ear:  Hearing, tympanic membrane, external ear and ear canal normal.   Nose: Nose normal.   Mouth/Throat: Uvula is midline and oropharynx is clear and moist.   Eyes: Lids are normal.   Neck: Normal range of motion. Neck supple.   Cardiovascular: Normal rate, regular rhythm and normal heart sounds.    Pulmonary/Chest: Effort normal. He has wheezes in the right middle field.   Wheezing noted at end inspiratory but cleared well with coughing and deep breaths. He does have a stated history of asthma   Abdominal: There is no tenderness.   Lymphadenopathy:        Head (right side): No submental, no submandibular, no tonsillar, no preauricular, no posterior auricular and no occipital adenopathy present.        Head (left side): No submental, no submandibular, no tonsillar, no preauricular, no posterior auricular and no occipital adenopathy present.     He has no cervical adenopathy.   Neurological: He is alert and oriented to person, place, and time.   Skin: Skin is warm, dry and intact.   Psychiatric: He has a normal mood and affect. His speech is normal and behavior is normal. Judgment and thought content normal. Cognition and memory are normal.   Nursing note and vitals reviewed.      Assessment & Plan:       Allergic rhinitis, unspecified allergic rhinitis trigger, unspecified rhinitis seasonality  -     levocetirizine (XYZAL) 5 MG tablet; Take 1 tablet (5 mg total) by mouth every evening. antihistamine  Dispense: 30 tablet; Refill: 3  -     montelukast (SINGULAIR) 10 mg tablet; Take 1 tablet (10 mg total) by mouth every evening.  Dispense: 30 tablet; Refill: 3  -     albuterol 90 mcg/actuation inhaler; Inhale 2 puffs into the lungs every 6 (six) hours as needed for Wheezing or Shortness of Breath (or coughing). Rescue  Dispense: 18 g; Refill: 3    History of asthma  -     levocetirizine (XYZAL) 5 MG tablet; Take 1 tablet (5 mg total) by mouth every evening. antihistamine  Dispense: 30 tablet; Refill: 3  -     montelukast  (SINGULAIR) 10 mg tablet; Take 1 tablet (10 mg total) by mouth every evening.  Dispense: 30 tablet; Refill: 3  -     albuterol 90 mcg/actuation inhaler; Inhale 2 puffs into the lungs every 6 (six) hours as needed for Wheezing or Shortness of Breath (or coughing). Rescue  Dispense: 18 g; Refill: 3    Cough in adult    Saline nasal spray in shower nightly  Delsym over the counter  RTC if temperature over 100          Return if symptoms worsen or fail to improve, for temperature or worsening cough.

## 2017-03-27 ENCOUNTER — TELEPHONE (OUTPATIENT)
Dept: FAMILY MEDICINE | Facility: CLINIC | Age: 62
End: 2017-03-27

## 2017-03-27 NOTE — TELEPHONE ENCOUNTER
----- Message from Carli Brown sent at 3/27/2017  1:28 PM CDT -----  Contact: seld  Patient would like to speak to a nurse to see if he had the whopping cough shot    Please call    Thanks

## 2017-03-27 NOTE — TELEPHONE ENCOUNTER
Returned pt's call in regards to message below. Instructed pt that he was good to go due to having Tdap in 2014. Pt voiced understanding. CLC

## 2017-04-06 ENCOUNTER — CLINICAL SUPPORT (OUTPATIENT)
Dept: CARDIOLOGY | Facility: CLINIC | Age: 62
End: 2017-04-06
Payer: COMMERCIAL

## 2017-04-06 DIAGNOSIS — Z86.79 HISTORY OF CARDIOMYOPATHY: ICD-10-CM

## 2017-04-06 DIAGNOSIS — I49.1 PAC (PREMATURE ATRIAL CONTRACTION): ICD-10-CM

## 2017-04-06 DIAGNOSIS — E78.00 HYPERCHOLESTEREMIA: ICD-10-CM

## 2017-04-06 PROCEDURE — 93224 XTRNL ECG REC UP TO 48 HRS: CPT | Mod: S$GLB,,, | Performed by: INTERNAL MEDICINE

## 2017-04-06 PROCEDURE — 93306 TTE W/DOPPLER COMPLETE: CPT | Mod: S$GLB,,, | Performed by: INTERNAL MEDICINE

## 2017-04-10 LAB
AORTIC VALVE REGURGITATION: ABNORMAL
DIASTOLIC DYSFUNCTION: YES
ESTIMATED PA SYSTOLIC PRESSURE: 29
MITRAL VALVE REGURGITATION: ABNORMAL
RETIRED EF AND QEF - SEE NOTES: 30 (ref 55–65)
TRICUSPID VALVE REGURGITATION: ABNORMAL

## 2017-04-17 ENCOUNTER — TELEPHONE (OUTPATIENT)
Dept: CARDIOLOGY | Facility: CLINIC | Age: 62
End: 2017-04-17

## 2017-04-17 NOTE — TELEPHONE ENCOUNTER
Pt scheduled on Thurs for TANVIR based on results of CFD echo and holter.  Does pt still need to do TANVIR????    Please advise so he can cancel if not needed.    Also pt looking for interpretation of CFD, does not understand it.

## 2017-04-17 NOTE — TELEPHONE ENCOUNTER
----- Message from Donna Joe sent at 4/17/2017  8:07 AM CDT -----  Contact: Patient  Patient wants to know if he still needs to have stress tests on Thursday. Please call patient at [phone]. Also would like the results.

## 2017-04-17 NOTE — TELEPHONE ENCOUNTER
msg forwarded to dr michel for inpression on cfd and holter results. Pt wants to know if still needs to do celso stress test on Thurs

## 2017-04-20 ENCOUNTER — LAB VISIT (OUTPATIENT)
Dept: LAB | Facility: HOSPITAL | Age: 62
End: 2017-04-20
Attending: INTERNAL MEDICINE
Payer: COMMERCIAL

## 2017-04-20 ENCOUNTER — TELEPHONE (OUTPATIENT)
Dept: CARDIOLOGY | Facility: CLINIC | Age: 62
End: 2017-04-20

## 2017-04-20 ENCOUNTER — OFFICE VISIT (OUTPATIENT)
Dept: CARDIOLOGY | Facility: CLINIC | Age: 62
End: 2017-04-20
Payer: COMMERCIAL

## 2017-04-20 VITALS
HEIGHT: 72 IN | SYSTOLIC BLOOD PRESSURE: 120 MMHG | RESPIRATION RATE: 16 BRPM | HEART RATE: 64 BPM | WEIGHT: 209.19 LBS | BODY MASS INDEX: 28.33 KG/M2 | DIASTOLIC BLOOD PRESSURE: 62 MMHG

## 2017-04-20 DIAGNOSIS — E78.00 HYPERCHOLESTEREMIA: ICD-10-CM

## 2017-04-20 DIAGNOSIS — Z87.09 HISTORY OF ASTHMA: ICD-10-CM

## 2017-04-20 DIAGNOSIS — Z79.01 CHRONIC ANTICOAGULATION: ICD-10-CM

## 2017-04-20 DIAGNOSIS — Z86.79 HISTORY OF CARDIOMYOPATHY: ICD-10-CM

## 2017-04-20 DIAGNOSIS — R53.83 FATIGUE, UNSPECIFIED TYPE: ICD-10-CM

## 2017-04-20 DIAGNOSIS — J30.9 ALLERGIC RHINITIS, UNSPECIFIED ALLERGIC RHINITIS TRIGGER, UNSPECIFIED RHINITIS SEASONALITY: ICD-10-CM

## 2017-04-20 DIAGNOSIS — Z01.812 PRE-PROCEDURE LAB EXAM: ICD-10-CM

## 2017-04-20 DIAGNOSIS — I49.1 PAC (PREMATURE ATRIAL CONTRACTION): ICD-10-CM

## 2017-04-20 DIAGNOSIS — E78.00 HYPERCHOLESTEREMIA: Primary | ICD-10-CM

## 2017-04-20 DIAGNOSIS — Z86.79 HISTORY OF CARDIOMYOPATHY: Primary | ICD-10-CM

## 2017-04-20 LAB
ANION GAP SERPL CALC-SCNC: 6 MMOL/L
APTT BLDCRRT: 26.8 SEC
BASOPHILS # BLD AUTO: 0.03 K/UL
BASOPHILS NFR BLD: 0.6 %
BUN SERPL-MCNC: 17 MG/DL
CALCIUM SERPL-MCNC: 9.3 MG/DL
CHLORIDE SERPL-SCNC: 104 MMOL/L
CO2 SERPL-SCNC: 30 MMOL/L
CREAT SERPL-MCNC: 1 MG/DL
DIFFERENTIAL METHOD: ABNORMAL
EOSINOPHIL # BLD AUTO: 0.2 K/UL
EOSINOPHIL NFR BLD: 3 %
ERYTHROCYTE [DISTWIDTH] IN BLOOD BY AUTOMATED COUNT: 13.7 %
EST. GFR  (AFRICAN AMERICAN): >60 ML/MIN/1.73 M^2
EST. GFR  (NON AFRICAN AMERICAN): >60 ML/MIN/1.73 M^2
GLUCOSE SERPL-MCNC: 109 MG/DL
HCT VFR BLD AUTO: 39.9 %
HGB BLD-MCNC: 13.9 G/DL
INR PPP: 1
LYMPHOCYTES # BLD AUTO: 1 K/UL
LYMPHOCYTES NFR BLD: 19.2 %
MCH RBC QN AUTO: 32.7 PG
MCHC RBC AUTO-ENTMCNC: 34.8 %
MCV RBC AUTO: 94 FL
MONOCYTES # BLD AUTO: 0.9 K/UL
MONOCYTES NFR BLD: 16.5 %
NEUTROPHILS # BLD AUTO: 3.3 K/UL
NEUTROPHILS NFR BLD: 60.5 %
PLATELET # BLD AUTO: 218 K/UL
PMV BLD AUTO: 11.1 FL
POTASSIUM SERPL-SCNC: 4.8 MMOL/L
PROTHROMBIN TIME: 10.6 SEC
RBC # BLD AUTO: 4.25 M/UL
SODIUM SERPL-SCNC: 140 MMOL/L
WBC # BLD AUTO: 5.41 K/UL

## 2017-04-20 PROCEDURE — 85025 COMPLETE CBC W/AUTO DIFF WBC: CPT

## 2017-04-20 PROCEDURE — 1160F RVW MEDS BY RX/DR IN RCRD: CPT | Mod: S$GLB,,, | Performed by: INTERNAL MEDICINE

## 2017-04-20 PROCEDURE — 99213 OFFICE O/P EST LOW 20 MIN: CPT | Mod: S$GLB,,, | Performed by: INTERNAL MEDICINE

## 2017-04-20 PROCEDURE — 85610 PROTHROMBIN TIME: CPT | Mod: PO

## 2017-04-20 PROCEDURE — 80048 BASIC METABOLIC PNL TOTAL CA: CPT

## 2017-04-20 PROCEDURE — 99999 PR PBB SHADOW E&M-EST. PATIENT-LVL III: CPT | Mod: PBBFAC,,, | Performed by: INTERNAL MEDICINE

## 2017-04-20 PROCEDURE — 3074F SYST BP LT 130 MM HG: CPT | Mod: S$GLB,,, | Performed by: INTERNAL MEDICINE

## 2017-04-20 PROCEDURE — 85730 THROMBOPLASTIN TIME PARTIAL: CPT | Mod: PO

## 2017-04-20 PROCEDURE — 36415 COLL VENOUS BLD VENIPUNCTURE: CPT | Mod: PO

## 2017-04-20 PROCEDURE — 3078F DIAST BP <80 MM HG: CPT | Mod: S$GLB,,, | Performed by: INTERNAL MEDICINE

## 2017-04-20 RX ORDER — NITROGLYCERIN 0.4 MG/1
0.4 TABLET SUBLINGUAL EVERY 5 MIN PRN
Status: CANCELLED | OUTPATIENT
Start: 2017-04-20

## 2017-04-20 RX ORDER — RAMIPRIL 1.25 MG/1
1.25 CAPSULE ORAL NIGHTLY
Qty: 90 CAPSULE | Refills: 4 | Status: SHIPPED | OUTPATIENT
Start: 2017-04-20 | End: 2018-08-02 | Stop reason: SDUPTHER

## 2017-04-20 RX ORDER — ASPIRIN 81 MG/1
81 TABLET ORAL ONCE
Status: CANCELLED | OUTPATIENT
Start: 2017-04-20 | End: 2017-04-20

## 2017-04-20 RX ORDER — ACETAMINOPHEN 500 MG
1 TABLET ORAL DAILY
COMMUNITY

## 2017-04-20 RX ORDER — SODIUM CHLORIDE 9 MG/ML
INJECTION, SOLUTION INTRAVENOUS CONTINUOUS
Status: CANCELLED | OUTPATIENT
Start: 2017-04-20

## 2017-04-20 NOTE — MR AVS SNAPSHOT
[facility]  [address]  [facility] [address]  Phone: [phone]                  Luke Borden   2017 1:20 PM   Office Visit    Description:  Male : 1955   Provider:  Rusty Coleman MD   Department:  [facility] - Cardiology           Diagnoses this Visit        Comments    History of cardiomyopathy    -  Primary     PAC (premature atrial contraction)         Hypercholesteremia         History of asthma         Allergic rhinitis, unspecified allergic rhinitis trigger, unspecified rhinitis seasonality                To Do List           Future Appointments        Provider Department Dept Phone    2017 3:00 PM Rusty Coleman MD [facility] [phone]      Goals (5 Years of Data)     None      Follow-Up and Disposition     Return in about 6 months (around 10/20/2017).       These Medications        Disp Refills Start End    ramipril (ALTACE) 1.25 MG capsule 90 capsule 4 2017    Take 1 capsule (1.25 mg total) by mouth every evening. - Oral    Pharmacy: [facility]/pharmacy #[id number] - [place] - [address] Ph #: [phone]         [facility] On Call     [facility] On Call Nurse Care Line -  Assistance  Unless otherwise directed by your provider, please contact [facility], our nurse care line that is available for  assistance.     Registered nurses in the [facility] provide: appointment scheduling, clinical advisement, health education, and other advisory services.  Call: [phone] (toll free)               Medications           Message regarding Medications     Verify the changes and/or additions to your medication regime listed below are the same as discussed with your clinician today.  If any of these changes or additions are incorrect, please notify your healthcare provider.        START taking these NEW medications        Refills    ramipril (ALTACE) 1.25 MG capsule 4    Sig: Take 1 capsule (1.25 mg total) by  mouth every evening.    Class: Normal    Route: Oral      STOP taking these medications     ergocalciferol (ERGOCALCIFEROL) 50,000 unit Cap TAKE ONE TWICE WEEKLY X 8 WEEKS. THEN TAKE OTC D 3 2000 UNITS DAILY. W.B.    montelukast (SINGULAIR) 10 mg tablet Take 1 tablet (10 mg total) by mouth every evening.           Verify that the below list of medications is an accurate representation of the medications you are currently taking.  If none reported, the list may be blank. If incorrect, please contact your healthcare provider. Carry this list with you in case of emergency.           Current Medications     albuterol 90 mcg/actuation inhaler Inhale 2 puffs into the lungs every 6 (six) hours as needed for Wheezing or Shortness of Breath (or coughing). Rescue    atorvastatin (LIPITOR) 20 MG tablet Take 1 tablet (20 mg total) by mouth once daily.    cholecalciferol, vitamin D3, (VITAMIN D3) 2,000 unit Cap Take 1 capsule by mouth once daily.    levocetirizine (XYZAL) 5 MG tablet Take 1 tablet (5 mg total) by mouth every evening. antihistamine    melatonin 10 mg Cap Take by mouth.    metoprolol succinate (TOPROL-XL) 50 MG 24 hr tablet 11/2 tab QAm    ramipril (ALTACE) 1.25 MG capsule Take 1 capsule (1.25 mg total) by mouth every evening.    tamsulosin (FLOMAX) 0.4 mg Cp24 Take 1 capsule (0.4 mg total) by mouth once daily.    temazepam (RESTORIL) 15 mg Cap TAKE 1 CAPSULE BY MOUTH NIGHTLY AS NEEDED FOR SLEEP           Clinical Reference Information           Your Vitals Were     BP Pulse Resp Height Weight BMI    120/62 64 16 6' (1.829 m) 94.9 kg (209 lb 3.5 oz) 28.37 kg/m2      Blood Pressure          Most Recent Value    BP  120/62      Allergies as of 4/20/2017     No Known Allergies      Immunizations Administered on Date of Encounter - 4/20/2017     None      Orders Placed During Today's Visit      Normal Orders This Visit    Ambulatory Referral to Electrophysiology       Language Assistance Services     ATTENTION:  Language assistance services are available, free of charge. Please call [phone].      ATENCIÓN: Si habla ismael, tiene a girard disposición servicios gratuitos de asistencia lingüística. Llame al [phone].     CHÚ Ý: N?u b?n nói Ti?ng Vi?t, có các d?ch v? h? tr? ngôn ng? mi?n phí dành cho b?n. G?i s? [phone].         [facility] complies with applicable Federal civil rights laws and does not discriminate on the basis of race, color, national origin, age, disability, or sex.

## 2017-04-20 NOTE — TELEPHONE ENCOUNTER
Attempted to call patient re: visit with Dr Coleman today. Dr Coleman has referred pt to Dr Shawn Hamilton. Tried to contact pt to set up the appt. No answer, LVM to call back to schedule.

## 2017-04-20 NOTE — PROGRESS NOTES
Subjective:    Patient ID:  Luke Borden is a 61 y.o. male who presents for follow-up of No chief complaint on file.      HPI   Here for follow up of PAC's, decreased EF. No CP or SOB. Occasional palpitations, no syncope.    Review of Systems   Constitution: Negative for malaise/fatigue.   Eyes: Negative for blurred vision.   Cardiovascular: Negative for chest pain, claudication, cyanosis, dyspnea on exertion, irregular heartbeat, leg swelling, near-syncope, orthopnea, palpitations, paroxysmal nocturnal dyspnea and syncope.   Respiratory: Negative for cough and shortness of breath.    Hematologic/Lymphatic: Does not bruise/bleed easily.   Musculoskeletal: Negative for back pain, falls, joint pain, muscle cramps, muscle weakness and myalgias.   Gastrointestinal: Negative for abdominal pain, change in bowel habit, nausea and vomiting.   Genitourinary: Negative for urgency.   Neurological: Negative for dizziness, focal weakness and light-headedness.        Objective:    Physical Exam   Constitutional: He is oriented to person, place, and time. He appears well-developed and well-nourished. He is cooperative.   HENT:   Head: Normocephalic and atraumatic.   Eyes: Conjunctivae are normal. Right eye exhibits no exudate. Left eye exhibits no exudate.   Neck: Normal range of motion. Neck supple. Normal carotid pulses and no JVD present. Carotid bruit is not present. No thyromegaly present.   Cardiovascular: Normal rate, regular rhythm, normal heart sounds and intact distal pulses.    Pulses:       Carotid pulses are 2+ on the right side, and 2+ on the left side.       Radial pulses are 2+ on the right side, and 2+ on the left side.        Dorsalis pedis pulses are 2+ on the right side, and 2+ on the left side.        Posterior tibial pulses are 2+ on the right side, and 2+ on the left side.   Pulmonary/Chest: Effort normal and breath sounds normal.   Abdominal: Soft. Bowel sounds are normal.   Musculoskeletal: Normal  range of motion. He exhibits no edema.   Neurological: He is alert and oriented to person, place, and time. Gait normal.   Skin: Skin is warm, dry and intact. No cyanosis. Nails show no clubbing.   Psychiatric: He has a normal mood and affect. His speech is normal and behavior is normal. Judgment and thought content normal.   Nursing note and vitals reviewed.            ..    Chemistry        Component Value Date/Time     02/10/2017 0926    K 4.5 02/10/2017 0926     02/10/2017 0926    CO2 27 02/10/2017 0926    BUN 20 02/10/2017 0926    CREATININE 1.1 02/10/2017 0926    GLU 99 02/10/2017 0926        Component Value Date/Time    CALCIUM 9.6 02/10/2017 0926    ALKPHOS 74 02/10/2017 0926    AST 25 02/10/2017 0926    ALT 22 02/10/2017 0926    BILITOT 0.9 02/10/2017 0926            ..  Lab Results   Component Value Date    CHOL 169 02/10/2017    CHOL 182 02/06/2016    CHOL 219 (H) 11/03/2014     Lab Results   Component Value Date    HDL 67 02/10/2017    HDL 71 02/06/2016    HDL 81 (H) 11/03/2014     Lab Results   Component Value Date    LDLCALC 85.4 02/10/2017    LDLCALC 94.6 02/06/2016    LDLCALC 106.8 11/03/2014     Lab Results   Component Value Date    TRIG 83 02/10/2017    TRIG 82 02/06/2016    TRIG 156 (H) 11/03/2014     Lab Results   Component Value Date    CHOLHDL 39.6 02/10/2017    CHOLHDL 39.0 02/06/2016    CHOLHDL 37.0 11/03/2014     ..  Lab Results   Component Value Date    WBC 8.89 02/10/2017    HGB 14.0 02/10/2017    HCT 41.9 02/10/2017    MCV 98 02/10/2017     02/10/2017       Test(s) Reviewed  I have reviewed the following in detail:  [] Stress test   [] Angiography   [x] Echocardiogram   [x] Labs   [x] Other:       Assessment:         ICD-10-CM ICD-9-CM   1. History of cardiomyopathy Z86.79 V12.59   2. PAC (premature atrial contraction) I49.1 427.61   3. Hypercholesteremia E78.00 272.0     Problem List Items Addressed This Visit     History of cardiomyopathy - Primary    Overview      2/2017 EF 30 %         Hypercholesteremia    PAC (premature atrial contraction)           Plan:           Return to clinic 6 months   Low level/low impact aerobic exercise 5x's/wk. Heart healthy diet and risk factor modification.    See labs and med orders.  [facility] left radial access due to LVEF still 30 % LVID 6.2 cm, no AF on holter but questionable NSVT.   Refer to EP if negative cath due to arrhythmic CM (PAC's/PVC's)    The risks, benefits, and alternatives of vascular catheterization with angiography and possible angioplasty/stenting, hemodynamic measurements and intervention were discussed and all questions answered. We had a discussion regarding risk of stroke, MI, bleeding access site complications, renal failure, emergent need for heart surgery, acute limb complications including ischemia and loss, contrast allergy and death.    The risks, benefits, and alternatives of moderate sedation were discussed, specifically aspiration, low blood pressure and need for prolonged ventilation. Advised to avoid social media/internet shopping after sedation.  All questions were answered.  The patient and/or their surrogate understands the risks and benefits of the procedure and wishes to go ahead with the procedure.

## 2017-04-21 ENCOUNTER — TELEPHONE (OUTPATIENT)
Dept: CARDIOLOGY | Facility: CLINIC | Age: 62
End: 2017-04-21

## 2017-04-21 NOTE — TELEPHONE ENCOUNTER
Spoke with patient re: angio on Monday. Pt, if possible, would like to have angio earlier in am if there is a cancellation.  Needs to be able to drive on Tues to  daughter.  Called Liz at cath lab and informed. She offered to move pt to 7am if Dr Coleman is ammenable. Will Chk with Dr Coleman

## 2017-04-21 NOTE — TELEPHONE ENCOUNTER
----- Message from Ryanne Tellez sent at 4/21/2017  9:31 AM CDT -----  Contact: self                           attn:  Wandy  Patient [phone] is calling to speak with Nurse Wandy concerning his surgery/please call

## 2017-05-08 ENCOUNTER — CLINICAL SUPPORT (OUTPATIENT)
Dept: CARDIOLOGY | Facility: CLINIC | Age: 62
End: 2017-05-08
Payer: COMMERCIAL

## 2017-05-08 ENCOUNTER — TELEPHONE (OUTPATIENT)
Dept: CARDIOLOGY | Facility: CLINIC | Age: 62
End: 2017-05-08

## 2017-05-08 ENCOUNTER — INITIAL CONSULT (OUTPATIENT)
Dept: CARDIOLOGY | Facility: CLINIC | Age: 62
End: 2017-05-08
Payer: COMMERCIAL

## 2017-05-08 VITALS
WEIGHT: 207 LBS | SYSTOLIC BLOOD PRESSURE: 123 MMHG | HEIGHT: 72 IN | DIASTOLIC BLOOD PRESSURE: 78 MMHG | HEART RATE: 78 BPM | BODY MASS INDEX: 28.04 KG/M2

## 2017-05-08 DIAGNOSIS — I49.3 PVC (PREMATURE VENTRICULAR CONTRACTION): ICD-10-CM

## 2017-05-08 DIAGNOSIS — I42.8 NONISCHEMIC CARDIOMYOPATHY: ICD-10-CM

## 2017-05-08 DIAGNOSIS — I49.1 PAC (PREMATURE ATRIAL CONTRACTION): Primary | ICD-10-CM

## 2017-05-08 DIAGNOSIS — I49.1 PAC (PREMATURE ATRIAL CONTRACTION): ICD-10-CM

## 2017-05-08 DIAGNOSIS — G47.30 SLEEP APNEA, UNSPECIFIED TYPE: Primary | ICD-10-CM

## 2017-05-08 DIAGNOSIS — I47.10 SVT (SUPRAVENTRICULAR TACHYCARDIA): ICD-10-CM

## 2017-05-08 DIAGNOSIS — Z86.79 HISTORY OF CARDIOMYOPATHY: ICD-10-CM

## 2017-05-08 DIAGNOSIS — I49.3 PREMATURE VENTRICULAR CONTRACTIONS (PVCS) (VPCS): ICD-10-CM

## 2017-05-08 PROCEDURE — 1160F RVW MEDS BY RX/DR IN RCRD: CPT | Mod: S$GLB,,, | Performed by: INTERNAL MEDICINE

## 2017-05-08 PROCEDURE — 99204 OFFICE O/P NEW MOD 45 MIN: CPT | Mod: S$GLB,,, | Performed by: INTERNAL MEDICINE

## 2017-05-08 PROCEDURE — 93000 ELECTROCARDIOGRAM COMPLETE: CPT | Mod: S$GLB,,, | Performed by: INTERNAL MEDICINE

## 2017-05-08 PROCEDURE — 3074F SYST BP LT 130 MM HG: CPT | Mod: S$GLB,,, | Performed by: INTERNAL MEDICINE

## 2017-05-08 PROCEDURE — 99999 PR PBB SHADOW E&M-EST. PATIENT-LVL III: CPT | Mod: PBBFAC,,, | Performed by: INTERNAL MEDICINE

## 2017-05-08 PROCEDURE — 3078F DIAST BP <80 MM HG: CPT | Mod: S$GLB,,, | Performed by: INTERNAL MEDICINE

## 2017-05-08 NOTE — PROGRESS NOTES
"Subjective:    Patient ID:  Luke Borden is a 61 y.o. male who presents for evaluation of PAC's (Ref by Dr. Coleman ) and PVC's      HPI 62 yo male with NICM, PAC's, PVC's, nonsustained atrial tachycardia.  Primary Cardiologist is Dr. Coleman.  Retired, [employer] .  Presented to PCP for routine visit, noted to have irregular heart beat.  Work-up revealed ectopy and cardiomyopathy.  Echo 2/16/17 EF 30-35%  Holter 2/17/17 2004 PVC's, frequent PAC's and "frequent runs of AT/AF."  Placed on beta blocker and ace inhibitor.  Echo 4/6/17 EF 30% LVEDD 6.2 cm, ALEXIS 28, moderate TR.  48 holter 4/6/17 1605 PVC's, 2431 PAC's frequent 3-20 beat runs of atrial tachycardia.  LHC 4/24/17 normal coronary arteries.  Denies palpitations, dyspnea on exertion, syncope, chest discomfort.  Works out 3-4 times a day without problems.  ECG reveals nsr with frequent runs of nonsustained AT, positive p waves in v1 through v3, difficult to ascertain other leads.  Denies snoring.  Minimal etoh.  Minimal caffeine.        Review of Systems   Constitution: Negative. Negative for weakness and malaise/fatigue.   Cardiovascular: Negative for chest pain, dyspnea on exertion, irregular heartbeat, leg swelling, near-syncope, orthopnea, palpitations, paroxysmal nocturnal dyspnea and syncope.   Respiratory: Negative for cough and shortness of breath.    Neurological: Negative for dizziness and light-headedness.   All other systems reviewed and are negative.       Objective:    Physical Exam   Constitutional: He is oriented to person, place, and time. He appears well-developed and well-nourished.   Eyes: Conjunctivae are normal. No scleral icterus.   Neck: No JVD present. No tracheal deviation present.   Cardiovascular: Normal rate, regular rhythm and normal heart sounds.  Frequent extrasystoles are present. PMI is not displaced.    Pulmonary/Chest: Effort normal and breath sounds normal. No respiratory distress.   Abdominal: Soft. There is " no hepatosplenomegaly. There is no tenderness.   Musculoskeletal: He exhibits no edema (lower extremity) or tenderness.   Neurological: He is alert and oriented to person, place, and time.   Skin: Skin is warm and dry. No rash noted.   Psychiatric: He has a normal mood and affect. His behavior is normal.         Assessment:       1. PAC (premature atrial contraction)    2. Nonischemic cardiomyopathy    3. Premature ventricular contractions (PVCs) (VPCs)    4. SVT (supraventricular tachycardia)         Plan:           Cardiomyopathy, with frequent atrial ectopy.  Suspect possible RSPV focus, but we have limited data on surface ECG.  Recommend suppressing ectopy, given the decrease in ectopy.  First option would be medications.  Recommend admission for Sotalol loading.  If this does not suppress ectopy, given the cardiomyopathy, consider RFA, possible PVI.  Refer for evaluation of Sleep apnea.

## 2017-05-08 NOTE — LETTER
May 8, 2017      Rusty Coleman MD  [address] [facility] [address] [place] [address]           [facility] Arrhythmia  [address] [facility] [place] [address]  Phone: [phone]          Patient: Luke Borden   MR Number: [id number]   YOB: 1955   Date of Visit: 5/8/2017       Dear Dr. Rusty Coleman:    Thank you for referring Luke Borden to me for evaluation. Attached you will find relevant portions of my assessment and plan of care.    If you have questions, please do not hesitate to call me. I look forward to following Luke Borden along with you.    Sincerely,    Shawn Hamilton MD    Enclosure  CC:  No Recipients    If you would like to receive this communication electronically, please contact [email or web address] or [phone] to request more information on [facility] access.    For providers and/or their staff who would like to refer a patient to [facility], please contact us through our one-stop-shop provider referral line, [facility] , at [phone].    If you feel you have received this communication in error or would no longer like to receive these types of communications, please e-mail [email or web address]

## 2017-05-08 NOTE — TELEPHONE ENCOUNTER
Dr Coleman,  Dr Hamilton saw pt today.  He needs you to put in orders for Sotolol loading.  I tenatively scheduled patient for Monday, May 22nd at 8am (Bed control has in a reservation but not solidified until your order is in epic.  Thanks

## 2017-05-09 ENCOUNTER — TELEPHONE (OUTPATIENT)
Dept: CARDIOLOGY | Facility: CLINIC | Age: 62
End: 2017-05-09

## 2017-05-09 NOTE — TELEPHONE ENCOUNTER
Dr Coleman,  Pt had angio on 4/24. He already had an appt scheduled w/you on 5/18. Left the 5/18 appt in place since it was 3 wks f/u for angio (4-8 wk f/u not able to schedule as you are dbl/tripple booked due to CME's.    HE NEEDS ORDERS ENTERED IN EPIC FOR SOTOLOL LOADING WITH YOU PER DR WOOD.  BED CONTROL HAS A RESERVATION FOR HIM BUT CANNOT SOLIDIFY APPT UNTIL YOU ENTER ORDERS.    If 5/18 appt not necessary please advise but patient said he plans on coming in then to discuss the Sotalol Loading and f/u for angio.

## 2017-05-22 PROBLEM — Z51.81 ENCOUNTER FOR MONITORING SOTALOL THERAPY: Status: ACTIVE | Noted: 2017-05-22

## 2017-05-22 PROBLEM — Z79.899 ENCOUNTER FOR MONITORING SOTALOL THERAPY: Status: ACTIVE | Noted: 2017-05-22

## 2017-05-25 PROBLEM — Z51.81 ENCOUNTER FOR MONITORING SOTALOL THERAPY: Status: RESOLVED | Noted: 2017-05-22 | Resolved: 2017-05-25

## 2017-05-25 PROBLEM — Z79.899 ENCOUNTER FOR MONITORING SOTALOL THERAPY: Status: RESOLVED | Noted: 2017-05-22 | Resolved: 2017-05-25

## 2017-06-05 ENCOUNTER — OFFICE VISIT (OUTPATIENT)
Dept: CARDIOLOGY | Facility: CLINIC | Age: 62
End: 2017-06-05
Payer: COMMERCIAL

## 2017-06-05 VITALS
BODY MASS INDEX: 27.26 KG/M2 | HEIGHT: 72 IN | WEIGHT: 201.25 LBS | DIASTOLIC BLOOD PRESSURE: 77 MMHG | SYSTOLIC BLOOD PRESSURE: 128 MMHG | HEART RATE: 55 BPM

## 2017-06-05 DIAGNOSIS — I49.3 PREMATURE VENTRICULAR CONTRACTIONS (PVCS) (VPCS): ICD-10-CM

## 2017-06-05 DIAGNOSIS — I47.10 SVT (SUPRAVENTRICULAR TACHYCARDIA): ICD-10-CM

## 2017-06-05 DIAGNOSIS — I49.9 CARDIAC ARRHYTHMIA, UNSPECIFIED CARDIAC ARRHYTHMIA TYPE: ICD-10-CM

## 2017-06-05 DIAGNOSIS — I49.1 PAC (PREMATURE ATRIAL CONTRACTION): Primary | ICD-10-CM

## 2017-06-05 DIAGNOSIS — I42.8 NONISCHEMIC CARDIOMYOPATHY: ICD-10-CM

## 2017-06-05 PROCEDURE — 99999 PR PBB SHADOW E&M-EST. PATIENT-LVL III: CPT | Mod: PBBFAC,,, | Performed by: INTERNAL MEDICINE

## 2017-06-05 PROCEDURE — 99215 OFFICE O/P EST HI 40 MIN: CPT | Mod: S$GLB,,, | Performed by: INTERNAL MEDICINE

## 2017-06-05 PROCEDURE — 93000 ELECTROCARDIOGRAM COMPLETE: CPT | Mod: S$GLB,,, | Performed by: INTERNAL MEDICINE

## 2017-06-05 NOTE — PROGRESS NOTES
"Subjective:    Patient ID:  Luke Borden is a 61 y.o. male who presents for follow-up of Pvc's (Post hosp - sotalol loading/Review sleep study )      HPI  60 yo male with NICM, PAC's, PVC's, nonsustained atrial tachycardia.  Primary Cardiologist is Dr. Coleman.  Retired, [employer] .  Presented to PCP for routine visit, noted to have irregular heart beat.  Work-up revealed ectopy and cardiomyopathy.  Echo 2/16/17 EF 30-35%  Holter 2/17/17 2004 PVC's, frequent PAC's and "frequent runs of AT/AF."  Placed on beta blocker and ace inhibitor.  Echo 4/6/17 EF 30% LVEDD 6.2 cm, ALEXIS 28, moderate TR.  48 holter 4/6/17 1605 PVC's, 2431 PAC's frequent 3-20 beat runs of atrial tachycardia.  LHC 4/24/17 normal coronary arteries.  Admitted for Sotalol loading, now on 80 mg BID.  ECG reveals nsr with continued ectopy, both narrow and wide.  Wide beats more c/w PAC's with aberration.  Sleep study indicated mild JEANINE, "CPAP may be indicated."          Review of Systems   Constitution: Negative. Negative for weakness and malaise/fatigue.   Cardiovascular: Negative for chest pain, dyspnea on exertion, irregular heartbeat, leg swelling, near-syncope, orthopnea, palpitations, paroxysmal nocturnal dyspnea and syncope.   Respiratory: Negative for cough and shortness of breath.    Neurological: Negative for dizziness and light-headedness.   All other systems reviewed and are negative.       Objective:    Physical Exam   Constitutional: He is oriented to person, place, and time. He appears well-developed and well-nourished.   Eyes: Conjunctivae are normal. No scleral icterus.   Neck: No JVD present. No tracheal deviation present.   Cardiovascular: Normal rate, regular rhythm and normal heart sounds.  PMI is not displaced.    Pulmonary/Chest: Effort normal and breath sounds normal. No respiratory distress.   Abdominal: Soft. There is no hepatosplenomegaly. There is no tenderness.   Musculoskeletal: He exhibits no edema (lower " extremity) or tenderness.   Neurological: He is alert and oriented to person, place, and time.   Skin: Skin is warm and dry. No rash noted.   Psychiatric: He has a normal mood and affect. His behavior is normal.         Assessment:       1. PAC (premature atrial contraction)    2. Premature ventricular contractions (PVCs) (VPCs)    3. SVT (supraventricular tachycardia)    4. Nonischemic cardiomyopathy         Plan:           Frequent PAC's, possibly resulting in rate-related cardiomyopathy.  I have asked him to meet with Dr. Briones to optimize Sleep Apnea management.  Once this is done, repeat holter monitor.  If significant reduction in ectopy >> repeat echo.  If no >> consider RFA (likely PVI, as the focus appears to be RSPV).  We discussed risks and benefits of RFA.  F/u in 6 months.

## 2017-06-14 ENCOUNTER — TELEPHONE (OUTPATIENT)
Dept: ELECTROPHYSIOLOGY | Facility: CLINIC | Age: 62
End: 2017-06-14

## 2017-06-19 ENCOUNTER — TELEPHONE (OUTPATIENT)
Dept: CARDIOLOGY | Facility: CLINIC | Age: 62
End: 2017-06-19

## 2017-06-19 NOTE — TELEPHONE ENCOUNTER
Pt has 6 wk f/u appt with dr michel for 7/12 post sotolol loading.  He wants to know what Dr Hamilton needs re: his f/u appt with Marie Briones?  And when he is to see Dr Hamilton again. Please let him know what notes/documents are needed from Marie Briones's office.    ----- Message from Marichuy Ennis sent at 6/19/2017  8:35 AM CDT -----  Contact: self [phone]  Please call him regarding scheduling a follow up appt regarding the cath an sleep study.  I offered a couple of appts, but he declined and asked that you call him.  Thank you!

## 2017-06-20 ENCOUNTER — TELEPHONE (OUTPATIENT)
Dept: CARDIOLOGY | Facility: CLINIC | Age: 62
End: 2017-06-20

## 2017-06-20 ENCOUNTER — PATIENT MESSAGE (OUTPATIENT)
Dept: CARDIOLOGY | Facility: CLINIC | Age: 62
End: 2017-06-20

## 2017-06-20 ENCOUNTER — TELEPHONE (OUTPATIENT)
Dept: ELECTROPHYSIOLOGY | Facility: CLINIC | Age: 62
End: 2017-06-20

## 2017-06-20 DIAGNOSIS — I49.1 PAC (PREMATURE ATRIAL CONTRACTION): Primary | ICD-10-CM

## 2017-06-20 NOTE — TELEPHONE ENCOUNTER
Called pt back with instructions for holter monitor in about 6 weeks. Pt currently wearing CPAP for the past two weeks since sleep eval. Pt verbalized understanding.

## 2017-06-20 NOTE — TELEPHONE ENCOUNTER
----- Message from Marichuy Ennis sent at 6/19/2017  4:38 PM CDT -----  Contact: Wife Concetta [phone]  Call placed to pod. Patient's wife returned your call, please call back.  Thank you!

## 2017-06-20 NOTE — TELEPHONE ENCOUNTER
Attempted to call pts wife, Concetta, back. No answer, LVM to call back. Noted there is order in for a Holter Monitor and NM stress test but no order in system for echo. Will address when pt/pt's wife calls back.

## 2017-06-20 NOTE — TELEPHONE ENCOUNTER
----- Message from Brianna Lin sent at 6/20/2017 10:53 AM CDT -----  Contact: pt   Pt called to check with you about getting a Holter monitor scheduled. He wants to have it done in [place].   He can be reached @ [phone].  Thanks!!

## 2017-06-20 NOTE — TELEPHONE ENCOUNTER
Criss,    Pt called asking about holter and echo discussed at last visit here in [facility] w/Dr Hamilton.    Plan notes (copied/pasted) below:  Frequent PAC's, possibly resulting in rate-related cardiomyopathy.  I have asked him to meet with Dr. Briones to optimize Sleep Apnea management.  Once this is done, repeat holter monitor.  If significant reduction in ectopy >> repeat echo.  If no >> consider RFA (likely PVI, as the focus appears to be RSPV).  We discussed risks and benefits of RFA.  F/u in 6 months.    There are no orders from Dr Hamilton for echo or holter monitor. Patient has gotten the sleep study with Marie Briones and is now on a CPAP.  Pt is wanting to schedule the echo and holter if Dr Hamilton is ammenable.  Can you discuss with Dr Hamilton and reach out to pt re: these tests. I will be happy to schedule them as soon as Dr Hamilton makes a determination to do them.  Please let me know.  Thank you,  Raquel Mora LPN

## 2017-07-03 ENCOUNTER — TELEPHONE (OUTPATIENT)
Dept: CARDIOLOGY | Facility: CLINIC | Age: 62
End: 2017-07-03

## 2017-07-03 NOTE — TELEPHONE ENCOUNTER
----- Message from Kaye Kumar sent at 7/3/2017  9:21 AM CDT -----  Contact: pt   Wants to cancel appt , but wants to speak to nurse before this   Call back

## 2017-07-03 NOTE — TELEPHONE ENCOUNTER
Dr michel,  Pt had a 6wk f/u appt 7/12 and rescheduled for 8/31 due to holter ordered by Dr Hamilton on 8/3. Does he need to see you?  He is supposed to f/u with Joy post holter monitor. Please advise as he may cancel appt with you if not necessary.

## 2017-08-03 ENCOUNTER — CLINICAL SUPPORT (OUTPATIENT)
Dept: CARDIOLOGY | Facility: CLINIC | Age: 62
End: 2017-08-03
Payer: COMMERCIAL

## 2017-08-03 DIAGNOSIS — I49.1 PAC (PREMATURE ATRIAL CONTRACTION): ICD-10-CM

## 2017-08-03 PROCEDURE — 93224 XTRNL ECG REC UP TO 48 HRS: CPT | Mod: S$GLB,,, | Performed by: INTERNAL MEDICINE

## 2017-08-21 ENCOUNTER — TELEPHONE (OUTPATIENT)
Dept: FAMILY MEDICINE | Facility: CLINIC | Age: 62
End: 2017-08-21

## 2017-08-21 NOTE — TELEPHONE ENCOUNTER
----- Message from Padmini García RT sent at 8/18/2017  8:55 AM CDT -----  Contact: pt  [phone]   pt  [phone], requesting to check the status of his form he dropped off to have completed, please call pt to confirm, thanks.

## 2017-08-22 ENCOUNTER — TELEPHONE (OUTPATIENT)
Dept: CARDIOLOGY | Facility: CLINIC | Age: 62
End: 2017-08-22

## 2017-08-22 ENCOUNTER — PATIENT MESSAGE (OUTPATIENT)
Dept: CARDIOLOGY | Facility: CLINIC | Age: 62
End: 2017-08-22

## 2017-08-22 NOTE — TELEPHONE ENCOUNTER
----- Message from Kathryn Webster sent at 8/21/2017  4:07 PM CDT -----  Patient would like to talk about his appointment. Patient declined to give any further information,    Call placed to pod. No answer     Please call patient back at [phone]

## 2017-09-08 RX ORDER — ATORVASTATIN CALCIUM 20 MG/1
20 TABLET, FILM COATED ORAL DAILY
Qty: 90 TABLET | Refills: 3 | Status: SHIPPED | OUTPATIENT
Start: 2017-09-08 | End: 2018-10-03 | Stop reason: SDUPTHER

## 2017-09-08 NOTE — TELEPHONE ENCOUNTER
----- Message from [place] sent at 9/8/2017  9:59 AM CDT -----  1. What is the name of the medication you are requesting? atorvastatin  2. What is the dose? 20  3. How do you take the medication? Orally, topically, etc? oral  4. How often do you take this medication? 1/day  5. Do you need a 30 day or 90 day supply? 90  6. How many refills are you requesting? as many as poss  7. What is your preferred pharmacy and location of the pharmacy? .  [facility]/pharmacy #[id number] - [place] - [address]  [address]  [place] [address]  Phone: [phone] Fax: [phone]    8. Who can we contact with further questions? [phone]

## 2017-09-08 NOTE — TELEPHONE ENCOUNTER
Patient is asking for a refill of lipitor. States he gets the 20 mg tablets and takes 1/2 tablet for 10 mg dosage to save money.

## 2017-09-11 ENCOUNTER — OFFICE VISIT (OUTPATIENT)
Dept: CARDIOLOGY | Facility: CLINIC | Age: 62
End: 2017-09-11
Payer: COMMERCIAL

## 2017-09-11 VITALS
BODY MASS INDEX: 25.8 KG/M2 | SYSTOLIC BLOOD PRESSURE: 139 MMHG | DIASTOLIC BLOOD PRESSURE: 84 MMHG | HEIGHT: 72 IN | WEIGHT: 190.5 LBS | HEART RATE: 52 BPM

## 2017-09-11 DIAGNOSIS — I49.1 PAC (PREMATURE ATRIAL CONTRACTION): Primary | ICD-10-CM

## 2017-09-11 DIAGNOSIS — I49.3 PVC (PREMATURE VENTRICULAR CONTRACTION): ICD-10-CM

## 2017-09-11 DIAGNOSIS — G47.33 OBSTRUCTIVE SLEEP APNEA SYNDROME: ICD-10-CM

## 2017-09-11 DIAGNOSIS — I49.3 PREMATURE VENTRICULAR CONTRACTIONS (PVCS) (VPCS): ICD-10-CM

## 2017-09-11 DIAGNOSIS — I47.10 SVT (SUPRAVENTRICULAR TACHYCARDIA): ICD-10-CM

## 2017-09-11 DIAGNOSIS — I42.8 NONISCHEMIC CARDIOMYOPATHY: ICD-10-CM

## 2017-09-11 PROCEDURE — 3075F SYST BP GE 130 - 139MM HG: CPT | Mod: S$GLB,,, | Performed by: INTERNAL MEDICINE

## 2017-09-11 PROCEDURE — 99999 PR PBB SHADOW E&M-EST. PATIENT-LVL III: CPT | Mod: PBBFAC,,, | Performed by: INTERNAL MEDICINE

## 2017-09-11 PROCEDURE — 93000 ELECTROCARDIOGRAM COMPLETE: CPT | Mod: S$GLB,,, | Performed by: INTERNAL MEDICINE

## 2017-09-11 PROCEDURE — 3079F DIAST BP 80-89 MM HG: CPT | Mod: S$GLB,,, | Performed by: INTERNAL MEDICINE

## 2017-09-11 PROCEDURE — 99215 OFFICE O/P EST HI 40 MIN: CPT | Mod: S$GLB,,, | Performed by: INTERNAL MEDICINE

## 2017-09-11 PROCEDURE — 3008F BODY MASS INDEX DOCD: CPT | Mod: S$GLB,,, | Performed by: INTERNAL MEDICINE

## 2017-09-11 NOTE — PROGRESS NOTES
"Subjective:    Patient ID:  Luke Borden is a 61 y.o. male who presents for follow-up of PAC's (follow up discuss ablation) and Cardiomyopathy      HPI  62 yo male with NICM, PAC's, PVC's, nonsustained atrial tachycardia.  Primary Cardiologist is Dr. Coleman.  Retired, [employer] .  Presented to PCP for routine visit, noted to have irregular heart beat.  Work-up revealed ectopy and cardiomyopathy.  Echo 2/16/17 EF 30-35%  Holter 2/17/17 2004 PVC's, frequent PAC's and "frequent runs of AT/AF."  Placed on beta blocker and ace inhibitor.  Echo 4/6/17 EF 30% LVEDD 6.2 cm, ALEXIS 28, moderate TR.  48 holter 4/6/17 1605 PVC's, 2431 PAC's frequent 3-20 beat runs of atrial tachycardia.  LHC 4/24/17 normal coronary arteries.  Admitted for Sotalol loading, now on 80 mg BID.  ECG reveals nsr with continued ectopy, both narrow and wide.  Wide beats more c/w PAC's with aberration.  We elected to optimize Sleep Apnea management and weight reduction, and reassess.  Holter 8/3/17 2065 PAC's with nearly incessant runs in nonsustained atrial tachycardia, 5492 PVC's  Optimized in terms of CPAP.  Continues to lose weight.  Overall feeling well.    Review of Systems   Constitution: Negative. Negative for weakness and malaise/fatigue.   Cardiovascular: Negative for chest pain, dyspnea on exertion, irregular heartbeat, leg swelling, near-syncope, orthopnea, palpitations, paroxysmal nocturnal dyspnea and syncope.   Respiratory: Positive for shortness of breath. Negative for cough.    Neurological: Negative for dizziness and light-headedness.   All other systems reviewed and are negative.       Objective:    Physical Exam   Constitutional: He is oriented to person, place, and time. He appears well-developed and well-nourished.   Eyes: Conjunctivae are normal. No scleral icterus.   Neck: No JVD present. No tracheal deviation present.   Cardiovascular: Normal rate, regular rhythm and normal heart sounds.   Occasional " extrasystoles are present. PMI is not displaced.    Pulmonary/Chest: Effort normal and breath sounds normal. No respiratory distress.   Abdominal: Soft. There is no hepatosplenomegaly. There is no tenderness.   Musculoskeletal: He exhibits no edema (lower extremity) or tenderness.   Neurological: He is alert and oriented to person, place, and time.   Skin: Skin is warm and dry. No rash noted.   Psychiatric: He has a normal mood and affect. His behavior is normal.         Assessment:       1. PAC (premature atrial contraction)    2. Nonischemic cardiomyopathy    3. Premature ventricular contractions (PVCs) (VPCs)    4. SVT (supraventricular tachycardia)    5. Obstructive sleep apnea syndrome         Plan:             Frequent nonsustained atrial tachycardia, ? RSPV focus.  There is concern that this is resulting in rate related cardiomyopathy.  He has been optimized in terms of weight and sleep apnea management, and continues to have frequent nonsustained AT despite this + Sotalol.  Recommend repeating echo.  If EF improved >> continue current therapy.  If not >> proceed with RFA.  If if fact this is a RSPV focus, would recommend PVI.  Discussed the rationale for this with the patient.  If not PV focus >> focal RFA.  We will be prepared for PVI.  Risks and benefits discussed.  CTA of chest to delineate PV anatomy.  RFA.  Initiate Eliquis 5 mg BID one month prior.  Hold evening prior to procedure.  Hold Sotalol 3 days prior.

## 2017-09-12 DIAGNOSIS — I49.3 PVC (PREMATURE VENTRICULAR CONTRACTION): Primary | ICD-10-CM

## 2017-09-18 RX ORDER — SOTALOL HYDROCHLORIDE 80 MG/1
80 TABLET ORAL 2 TIMES DAILY
Qty: 60 TABLET | Refills: 0 | Status: CANCELLED | OUTPATIENT
Start: 2017-09-18 | End: 2018-09-18

## 2017-09-18 NOTE — TELEPHONE ENCOUNTER
----- Message from Elissa Watson sent at 9/18/2017  2:04 PM CDT -----  Contact: self  Needs a new prescription for sotalol (BETAPACE) 80 MG tablet.  Please call back at [phone]    [facility]/pharmacy #[id number] - [place] - [address]  [place] [address]  Phone: [phone] Fax: [phone]

## 2017-09-18 NOTE — TELEPHONE ENCOUNTER
----- Message from Brandi Maradiaga sent at 9/18/2017  4:19 PM CDT -----  Contact: patient  Please call pt at [phone] if any questions. Refill needed for Sotalol 80 mg x 30 day supply called into [facility] at [phone]. Out of meds  Dr Hamilton pt    Thank you

## 2017-09-18 NOTE — TELEPHONE ENCOUNTER
----- Message from RT Michael sent at 9/18/2017  8:52 AM CDT -----  Contact: pt    pt , requesting medication refills: Soptalol 80 mg, please call to confirm, thanks.

## 2017-09-18 NOTE — TELEPHONE ENCOUNTER
----- Message from Brandi Maradiaga sent at 9/18/2017  4:19 PM CDT -----  Contact: patient  Please call pt at [phone] if any questions. Refill needed for Sotalol 80 mg x 30 day supply called into [facility] at [phone]. Out of meds  Dr Hamilton pt    Thank you

## 2017-09-18 NOTE — TELEPHONE ENCOUNTER
----- Message from Brandi Maradiaga sent at 9/18/2017  4:19 PM CDT -----  Contact: patient  Please call pt at [phone] if any questions. Refill needed for Sotalol 80 mg x 30 day supply called into [facility] at [phone]. Out of meds  Dr Hamilton pt    Thank you

## 2017-09-19 RX ORDER — SOTALOL HYDROCHLORIDE 80 MG/1
80 TABLET ORAL 2 TIMES DAILY
Qty: 60 TABLET | Refills: 3 | Status: SHIPPED | OUTPATIENT
Start: 2017-09-19 | End: 2017-10-16 | Stop reason: SDUPTHER

## 2017-09-22 ENCOUNTER — TELEPHONE (OUTPATIENT)
Dept: CARDIOLOGY | Facility: CLINIC | Age: 62
End: 2017-09-22

## 2017-09-22 ENCOUNTER — CLINICAL SUPPORT (OUTPATIENT)
Dept: CARDIOLOGY | Facility: CLINIC | Age: 62
End: 2017-09-22
Payer: COMMERCIAL

## 2017-09-22 ENCOUNTER — TELEPHONE (OUTPATIENT)
Dept: ELECTROPHYSIOLOGY | Facility: CLINIC | Age: 62
End: 2017-09-22

## 2017-09-22 DIAGNOSIS — I49.1 PAC (PREMATURE ATRIAL CONTRACTION): ICD-10-CM

## 2017-09-22 DIAGNOSIS — I49.1 PAC (PREMATURE ATRIAL CONTRACTION): Primary | ICD-10-CM

## 2017-09-22 LAB
AORTIC VALVE REGURGITATION: ABNORMAL
DIASTOLIC DYSFUNCTION: YES
ESTIMATED PA SYSTOLIC PRESSURE: 28.81
MITRAL VALVE REGURGITATION: ABNORMAL
RETIRED EF AND QEF - SEE NOTES: 35 (ref 55–65)
TRICUSPID VALVE REGURGITATION: ABNORMAL

## 2017-09-22 PROCEDURE — 93306 TTE W/DOPPLER COMPLETE: CPT | Mod: S$GLB,,, | Performed by: INTERNAL MEDICINE

## 2017-09-22 NOTE — TELEPHONE ENCOUNTER
----- Message from Zeinab Cummins RN sent at 9/22/2017  2:16 PM CDT -----  This patient is scheduled for an ablation on 10/30/2017 with Dr Hamilton. He wants patient to have a CASSANDRA on Friday, 10/27/2017 with Dr Coleman prior to ablation. Please let me know if this is possible.   Thanks so much,  Isabel Cummins RN

## 2017-09-22 NOTE — TELEPHONE ENCOUNTER
Please Advise:  This patient is scheduled for an ablation on 10/30/2017 with Dr Hamilton. He wants patient to have a CASSANDRA on Friday, 10/27/2017 with Dr Coleman prior to ablation. Please let me know if this is possible.

## 2017-09-22 NOTE — TELEPHONE ENCOUNTER
Spoke with patient and advised that Dr Hamilton reviewed his echo results which were basically unchanged. He is recommending to move forward with the ablation. Pt verbalizes understanding and states the procedure was discussed during his OV on 9/11/2017. We scheduled the ablation for 10/30/2017. I will send his appt info through his patient portal. He understands that he will need to come here for the CTA (which we will schedule on a Saturday for him). Dr Hamilton is also starting him on Eliquis 5mg bid. Rx to be sent to [facility] in [place]. Pt voiced no other questions or concerns.

## 2017-09-22 NOTE — TELEPHONE ENCOUNTER
ABLATION EDUCATION CHECKLIST    10/21/2017 at 10:30am for CT scan  Please fast 4 hours prior to scan  Report to radiology on second floor of clinic tower (next to lab)      10/25/2017 at 10am  Labwork  PRE - PROCEDURE LABS HAVE BEEN ORDERED FOR YOU @ [facility]  BE SURE TO ARRIVE AT YOUR SCHEDULED TIME FOR THIS LAB WORK!  (YOU DO NOT HAVE TO FAST FOR THIS LABWORK!!!!)    10/27/2017 - Your CASSANDRA is scheduled with Dr Coleman. Their office will contact you with more information    10/30/2017 @ 6 AM Ablation  Report to Cardiology Waiting Room on 3rd floor of the Hospital    (Do not report to clinic)  Directions for Reporting to Cardiology Waiting Area in the Hospital  If you park in the Parking Garage:  Take elevators to the 2nd floor  Walk up ramp and turn right by Gold Elevators  Take elevator to the 3rd floor  Upon exiting the elevator, turn away from the clinic areas  Walk long vargas around to front of hospital to area with windows overlooking [facility]  Check in at Reception Desk  OR  If family is dropping you off:  Have them drop you off at the front of the Hospital  (Near the ER, where all the flags are hung).  Take the E elevators to the 3rd floor.  Check in at the Reception Desk in the waiting room.        Do not eat or drink anything after: 12 mn on the night before your procedure    Medications:   Hold Sotalol for 3 days prior to procedure. Last dose will be 10/26/2017.  Hold Eliquis the evening prior to the procedure and the morning of the procedure. Last dose will be am dose on 10/29/2017.  You may take all other morning medications with a sip of water    You will be spending the night after your procedure  You will need someone to drive you home the day after your procedure.    Your pain during your procedure will be managed by the anesthesia team.     THE ABOVE INSTRUCTIONS WERE GIVEN TO THE PATIENT VERBALLY AND THEY VERBALIZED UNDERSTANDING.  THEY DO NOT REQUIRE ANY SPECIAL NEEDS AND DO NOT  HAVE ANY LEARNING BARRIERS.    Any need to reschedule or cancel procedures, or any questions regarding your procedures should be addressed directly with the Arrhythmia Department Nurses at the following phone number: [phone]

## 2017-09-22 NOTE — TELEPHONE ENCOUNTER
----- Message from Shawn Hamilton MD sent at 9/22/2017  1:23 PM CDT -----  EF relatively unchanged, 35-40%.  Recommend proceeding with RFA as detailed in my note.  We have spoken at length about the procedure at our last visit, as we anticipated this to be the case.  If they have any other questions, please let me know, I would be happy to contact them.

## 2017-09-25 ENCOUNTER — TELEPHONE (OUTPATIENT)
Dept: CARDIOLOGY | Facility: CLINIC | Age: 62
End: 2017-09-25

## 2017-09-25 ENCOUNTER — TELEPHONE (OUTPATIENT)
Dept: ELECTROPHYSIOLOGY | Facility: CLINIC | Age: 62
End: 2017-09-25

## 2017-09-25 DIAGNOSIS — I48.91 ATRIAL FIBRILLATION, UNSPECIFIED TYPE: Primary | ICD-10-CM

## 2017-09-25 NOTE — TELEPHONE ENCOUNTER
Spoke with pt. He wanted to let me know that Dr Coleman's office did contact him to set up the CASSANDRA for 10/27/2017. We will be able to proceed with PVI on 10/30/17 as discussed. He wanted to know if he would need someone to drive him home from CASSANDRA. Advised that he will likely get some type of anesthesia and should have someone drive him home. He verbalized understanding.

## 2017-09-25 NOTE — TELEPHONE ENCOUNTER
Spoke to patient, informed him of upcoming procedure and directions, pt. Verbalized understanding.

## 2017-09-25 NOTE — TELEPHONE ENCOUNTER
----- Message from Amaya Lechuga sent at 9/25/2017  1:27 PM CDT -----  Contact: Pt called  Pt called, states he is needing to discuss his upcoming CASSANDRA.Ph for pt is [phone]. Thank you

## 2017-10-04 ENCOUNTER — PATIENT MESSAGE (OUTPATIENT)
Dept: ELECTROPHYSIOLOGY | Facility: CLINIC | Age: 62
End: 2017-10-04

## 2017-10-06 ENCOUNTER — TELEPHONE (OUTPATIENT)
Dept: ELECTROPHYSIOLOGY | Facility: CLINIC | Age: 62
End: 2017-10-06

## 2017-10-06 NOTE — TELEPHONE ENCOUNTER
Spoke with patient. We are trying to reschedule his procedure for 10/30/2017. He is unable to come in on 10/27/2017. I will look at schedule this afternoon and call him back.

## 2017-10-06 NOTE — TELEPHONE ENCOUNTER
----- Message from Brandi Maradiaga sent at 10/6/2017  9:38 AM CDT -----  Contact: patient  Please call pt at [phone].  Patient having an Ablation on 10/30/17 with Dr Hamilton and has questions    Thank you

## 2017-10-12 ENCOUNTER — TELEPHONE (OUTPATIENT)
Dept: CARDIOLOGY | Facility: CLINIC | Age: 62
End: 2017-10-12

## 2017-10-12 ENCOUNTER — TELEPHONE (OUTPATIENT)
Dept: ELECTROPHYSIOLOGY | Facility: CLINIC | Age: 62
End: 2017-10-12

## 2017-10-12 NOTE — TELEPHONE ENCOUNTER
Hi im sorry to inform you Dr. Coleman can not do the CASSANDRA that day. He's on vacation the 30th & 31st., but Dr. Dey is on hospital call that day. You can send a message to Dr. Dey staff to set it up.

## 2017-10-12 NOTE — TELEPHONE ENCOUNTER
----- Message from Zeinab Cummins RN sent at 10/12/2017  2:30 PM CDT -----  We have patient scheduled for ablation 10/31/2017. Can Dr Dey do CASSANDRA on patient 10/30/2017? He sees Dr Coleman who will be on vacation that day. Please let me know if this will work. We are trying to prevent having patient come to the [place] twice that week.  Thanks so much!

## 2017-10-12 NOTE — TELEPHONE ENCOUNTER
Spoke with patient and advised that we are trying to coordinate CASSANDRA with [facility]. Dr Coleman is off on 10/30/2017. Dr Dey may be able to cover the CASSANDRA, but we are waiting to here from them. He verbalizes understanding.

## 2017-10-12 NOTE — TELEPHONE ENCOUNTER
----- Message from Zeinab Cummins RN sent at 10/12/2017 12:24 PM CDT -----  We had to move patient's ablation to 10/31/2017. Is there any way he can have his CASSANDRA with Dr Coleman on Monday, 10/30/2017? The patient would prefer to have CASSANDRA on [facility].  Thanks!

## 2017-10-12 NOTE — TELEPHONE ENCOUNTER
----- Message from Maddi Estrada sent at 10/12/2017  2:33 PM CDT -----  Contact: pt  Pls call pt at [phone].  He is schedule for an ablation on 10/31/17 and wants to speak with you about the CASSANDRA test he needs to have done.    Thank you

## 2017-10-12 NOTE — TELEPHONE ENCOUNTER
----- Message from Brandi Maradiaga sent at 10/12/2017  8:43 AM CDT -----  Contact: patient  Please call pt at [phone]. Patient is having an Ablation with Dr Hamilton on 10/30/17 but has some medical questions pertaining to the procedure    Thank you

## 2017-10-12 NOTE — TELEPHONE ENCOUNTER
Will call the patient to reschedule CASSANDRA with Dr Dey. Cathleen at the cath lab is gone for the day will call back in the morning.

## 2017-10-13 ENCOUNTER — TELEPHONE (OUTPATIENT)
Dept: ELECTROPHYSIOLOGY | Facility: CLINIC | Age: 62
End: 2017-10-13

## 2017-10-13 ENCOUNTER — TELEPHONE (OUTPATIENT)
Dept: CARDIOLOGY | Facility: CLINIC | Age: 62
End: 2017-10-13

## 2017-10-13 NOTE — TELEPHONE ENCOUNTER
Left msg advising patient that I have not heard back from Dr. Dey's office yet about the CASSANDRA. I will send another message now.

## 2017-10-13 NOTE — TELEPHONE ENCOUNTER
----- Message from Zeinab Cummins RN sent at 10/13/2017 11:17 AM CDT -----  Hi. Sorry to be a bother, but the patient is calling back today and asking if he will be able to have his CASSANDRA there on 10/30/2017 (I sent a message to you guys yesterday). Please advise so I can let patient know.  Thanks

## 2017-10-13 NOTE — TELEPHONE ENCOUNTER
----- Message from Brandi Maradiaga sent at 10/13/2017 11:58 AM CDT -----  Contact: patient  Please call pt at [phone]. Patient needs to speak to you again.     Thank you

## 2017-10-13 NOTE — TELEPHONE ENCOUNTER
Spoke with patient. He is still trying to figure out whether he wants to have CASSANDRA on [facility] or [facility]. He is not happy that Dr Coleman is not available and has never met Dr Dey. He will let me know on Monday

## 2017-10-13 NOTE — TELEPHONE ENCOUNTER
----- Message from Brandi Maradiaga sent at 10/13/2017  8:32 AM CDT -----  Contact: patient  Please call pt at [phone]. Patient wants to have the future procedure that is scheduled on 10/30/17 done at the [place] location if possible    Thank you

## 2017-10-16 ENCOUNTER — TELEPHONE (OUTPATIENT)
Dept: ELECTROPHYSIOLOGY | Facility: CLINIC | Age: 62
End: 2017-10-16

## 2017-10-16 ENCOUNTER — PATIENT MESSAGE (OUTPATIENT)
Dept: ELECTROPHYSIOLOGY | Facility: CLINIC | Age: 62
End: 2017-10-16

## 2017-10-16 RX ORDER — SOTALOL HYDROCHLORIDE 80 MG/1
TABLET ORAL
Qty: 60 TABLET | Refills: 4 | Status: SHIPPED | OUTPATIENT
Start: 2017-10-16 | End: 2018-02-14 | Stop reason: SDUPTHER

## 2017-10-16 NOTE — TELEPHONE ENCOUNTER
Spoke with pt. He has decided to have the CASSANDRA done on the [facility] with Dr Dey. I spoke with Sanaz at the [place] office. Pt is scheduled for 10am. He will need to be at [facility] for 8am. Spoke with pt and relayed the information; also advised that he will hear from someone from the hospital by Friday with more instructions. He verbalizes understanding.

## 2017-10-19 ENCOUNTER — TELEPHONE (OUTPATIENT)
Dept: CARDIOLOGY | Facility: CLINIC | Age: 62
End: 2017-10-19

## 2017-10-19 NOTE — TELEPHONE ENCOUNTER
Attempted to contact patient, in answer, left a message stating to report to [facility] for 8:00 am for his 10:00 am procedure.

## 2017-10-19 NOTE — TELEPHONE ENCOUNTER
----- Message from Radha Curran sent at 10/19/2017  2:15 PM CDT -----  Contact: self  [phone]  Patient called asking what time to arrive for scheduled procedure on 10/30/2017.Thanks!

## 2017-10-21 ENCOUNTER — HOSPITAL ENCOUNTER (OUTPATIENT)
Dept: RADIOLOGY | Facility: HOSPITAL | Age: 62
Discharge: HOME | End: 2017-10-21
Attending: INTERNAL MEDICINE
Payer: COMMERCIAL

## 2017-10-21 DIAGNOSIS — I49.1 PAC (PREMATURE ATRIAL CONTRACTION): ICD-10-CM

## 2017-10-21 PROCEDURE — 25500020 PHARM REV CODE 255: Performed by: INTERNAL MEDICINE

## 2017-10-21 PROCEDURE — 71275 CT ANGIOGRAPHY CHEST: CPT | Mod: TC

## 2017-10-21 PROCEDURE — 71275 CT ANGIOGRAPHY CHEST: CPT | Mod: 26,,, | Performed by: RADIOLOGY

## 2017-10-21 RX ADMIN — IOHEXOL 100 ML: 350 INJECTION, SOLUTION INTRAVENOUS at 10:10

## 2017-10-25 ENCOUNTER — LAB VISIT (OUTPATIENT)
Dept: LAB | Facility: HOSPITAL | Age: 62
End: 2017-10-25
Attending: INTERNAL MEDICINE
Payer: COMMERCIAL

## 2017-10-25 DIAGNOSIS — I49.1 PAC (PREMATURE ATRIAL CONTRACTION): ICD-10-CM

## 2017-10-25 LAB
ANION GAP SERPL CALC-SCNC: 6 MMOL/L
APTT BLDCRRT: 29.5 SEC
BASOPHILS # BLD AUTO: 0.06 K/UL
BASOPHILS NFR BLD: 1 %
BUN SERPL-MCNC: 19 MG/DL
CALCIUM SERPL-MCNC: 9.7 MG/DL
CHLORIDE SERPL-SCNC: 104 MMOL/L
CO2 SERPL-SCNC: 31 MMOL/L
CREAT SERPL-MCNC: 1.1 MG/DL
DIFFERENTIAL METHOD: ABNORMAL
EOSINOPHIL # BLD AUTO: 0.3 K/UL
EOSINOPHIL NFR BLD: 5.1 %
ERYTHROCYTE [DISTWIDTH] IN BLOOD BY AUTOMATED COUNT: 13.4 %
EST. GFR  (AFRICAN AMERICAN): >60 ML/MIN/1.73 M^2
EST. GFR  (NON AFRICAN AMERICAN): >60 ML/MIN/1.73 M^2
GLUCOSE SERPL-MCNC: 92 MG/DL
HCT VFR BLD AUTO: 42.3 %
HGB BLD-MCNC: 14.1 G/DL
IMM GRANULOCYTES # BLD AUTO: 0.01 K/UL
IMM GRANULOCYTES NFR BLD AUTO: 0.2 %
INR PPP: 1.1
LYMPHOCYTES # BLD AUTO: 1.2 K/UL
LYMPHOCYTES NFR BLD: 19.9 %
MCH RBC QN AUTO: 32.3 PG
MCHC RBC AUTO-ENTMCNC: 33.3 G/DL
MCV RBC AUTO: 97 FL
MONOCYTES # BLD AUTO: 0.7 K/UL
MONOCYTES NFR BLD: 11.1 %
NEUTROPHILS # BLD AUTO: 3.7 K/UL
NEUTROPHILS NFR BLD: 62.7 %
NRBC BLD-RTO: 0 /100 WBC
PLATELET # BLD AUTO: 259 K/UL
PMV BLD AUTO: 11.4 FL
POTASSIUM SERPL-SCNC: 4.7 MMOL/L
PROTHROMBIN TIME: 11 SEC
RBC # BLD AUTO: 4.37 M/UL
SODIUM SERPL-SCNC: 141 MMOL/L
WBC # BLD AUTO: 5.83 K/UL

## 2017-10-25 PROCEDURE — 85610 PROTHROMBIN TIME: CPT | Mod: PO

## 2017-10-25 PROCEDURE — 36415 COLL VENOUS BLD VENIPUNCTURE: CPT | Mod: PO

## 2017-10-25 PROCEDURE — 80048 BASIC METABOLIC PNL TOTAL CA: CPT

## 2017-10-25 PROCEDURE — 85025 COMPLETE CBC W/AUTO DIFF WBC: CPT

## 2017-10-25 PROCEDURE — 85730 THROMBOPLASTIN TIME PARTIAL: CPT | Mod: PO

## 2017-10-26 ENCOUNTER — TELEPHONE (OUTPATIENT)
Dept: CARDIOLOGY | Facility: CLINIC | Age: 62
End: 2017-10-26

## 2017-10-26 NOTE — TELEPHONE ENCOUNTER
----- Message from Kaye Kumar sent at 10/26/2017  4:26 PM CDT -----  Contact: luke   Wants to know time procedure  [phone]

## 2017-10-30 ENCOUNTER — ANESTHESIA EVENT (OUTPATIENT)
Dept: MEDSURG UNIT | Facility: HOSPITAL | Age: 62
End: 2017-10-30
Payer: COMMERCIAL

## 2017-10-30 ENCOUNTER — TELEPHONE (OUTPATIENT)
Dept: ELECTROPHYSIOLOGY | Facility: CLINIC | Age: 62
End: 2017-10-30

## 2017-10-30 PROBLEM — I48.0 PAF (PAROXYSMAL ATRIAL FIBRILLATION): Status: ACTIVE | Noted: 2017-10-30

## 2017-10-30 NOTE — TELEPHONE ENCOUNTER
Spoke with patient to review pre-op instructions for ablation tomorrow. Pt had CASSANDRA with Dr Dey today and was told there was no clot. Advised pt to fast after 12mn and be here for 6am. He has held his Sotalol as instructed and will hold his Eliquis this evening and in am. He verbalizes understanding of all instructions and voices no other questions or concerns.

## 2017-10-31 ENCOUNTER — HOSPITAL ENCOUNTER (OUTPATIENT)
Facility: HOSPITAL | Age: 62
Discharge: HOME | End: 2017-11-01
Attending: INTERNAL MEDICINE | Admitting: INTERNAL MEDICINE
Payer: COMMERCIAL

## 2017-10-31 ENCOUNTER — SURGERY (OUTPATIENT)
Age: 62
End: 2017-10-31

## 2017-10-31 ENCOUNTER — ANESTHESIA (OUTPATIENT)
Dept: MEDSURG UNIT | Facility: HOSPITAL | Age: 62
End: 2017-10-31
Payer: COMMERCIAL

## 2017-10-31 DIAGNOSIS — E78.00 PURE HYPERCHOLESTEROLEMIA: ICD-10-CM

## 2017-10-31 DIAGNOSIS — I47.19 ATRIAL TACHYCARDIA: Primary | ICD-10-CM

## 2017-10-31 LAB
ABO + RH BLD: NORMAL
BLD GP AB SCN CELLS X3 SERPL QL: NORMAL
GLOBAL PERICARDIAL EFFUSION: NORMAL
MITRAL VALVE MOBILITY: NORMAL
RETIRED EF AND QEF - SEE NOTES: 45 (ref 55–65)

## 2017-10-31 PROCEDURE — 25000003 PHARM REV CODE 250: Performed by: NURSE PRACTITIONER

## 2017-10-31 PROCEDURE — 25000003 PHARM REV CODE 250

## 2017-10-31 PROCEDURE — D9220A PRA ANESTHESIA: Mod: ANES,,, | Performed by: ANESTHESIOLOGY

## 2017-10-31 PROCEDURE — 93656 COMPRE EP EVAL ABLTJ ATR FIB: CPT | Mod: ,,, | Performed by: INTERNAL MEDICINE

## 2017-10-31 PROCEDURE — 27201037 HC PRESSURE MONITORING SET UP

## 2017-10-31 PROCEDURE — 25000003 PHARM REV CODE 250: Performed by: NURSE ANESTHETIST, CERTIFIED REGISTERED

## 2017-10-31 PROCEDURE — 94799 UNLISTED PULMONARY SVC/PX: CPT

## 2017-10-31 PROCEDURE — 25000242 PHARM REV CODE 250 ALT 637 W/ HCPCS: Performed by: NURSE ANESTHETIST, CERTIFIED REGISTERED

## 2017-10-31 PROCEDURE — 93307 TTE W/O DOPPLER COMPLETE: CPT

## 2017-10-31 PROCEDURE — 25000003 PHARM REV CODE 250: Performed by: INTERNAL MEDICINE

## 2017-10-31 PROCEDURE — 86900 BLOOD TYPING SEROLOGIC ABO: CPT

## 2017-10-31 PROCEDURE — 37000008 HC ANESTHESIA 1ST 15 MINUTES: Performed by: INTERNAL MEDICINE

## 2017-10-31 PROCEDURE — 37000009 HC ANESTHESIA EA ADD 15 MINS: Performed by: INTERNAL MEDICINE

## 2017-10-31 PROCEDURE — 36620 INSERTION CATHETER ARTERY: CPT | Mod: 59,,, | Performed by: ANESTHESIOLOGY

## 2017-10-31 PROCEDURE — 93307 TTE W/O DOPPLER COMPLETE: CPT | Mod: 26,,, | Performed by: INTERNAL MEDICINE

## 2017-10-31 PROCEDURE — 93010 ELECTROCARDIOGRAM REPORT: CPT | Mod: ,,, | Performed by: INTERNAL MEDICINE

## 2017-10-31 PROCEDURE — D9220A PRA ANESTHESIA: Mod: CRNA,,, | Performed by: NURSE ANESTHETIST, CERTIFIED REGISTERED

## 2017-10-31 PROCEDURE — 93613 INTRACARDIAC EPHYS 3D MAPG: CPT | Mod: ,,, | Performed by: INTERNAL MEDICINE

## 2017-10-31 PROCEDURE — 93662 INTRACARDIAC ECG (ICE): CPT | Mod: 26,,, | Performed by: INTERNAL MEDICINE

## 2017-10-31 PROCEDURE — 93005 ELECTROCARDIOGRAM TRACING: CPT

## 2017-10-31 PROCEDURE — 63600175 PHARM REV CODE 636 W HCPCS: Performed by: NURSE ANESTHETIST, CERTIFIED REGISTERED

## 2017-10-31 PROCEDURE — 86850 RBC ANTIBODY SCREEN: CPT

## 2017-10-31 PROCEDURE — 93662 INTRACARDIAC ECG (ICE): CPT

## 2017-10-31 PROCEDURE — 25500020 PHARM REV CODE 255

## 2017-10-31 PROCEDURE — 63600175 PHARM REV CODE 636 W HCPCS

## 2017-10-31 RX ORDER — FENTANYL CITRATE 50 UG/ML
INJECTION, SOLUTION INTRAMUSCULAR; INTRAVENOUS
Status: DISCONTINUED | OUTPATIENT
Start: 2017-10-31 | End: 2017-10-31

## 2017-10-31 RX ORDER — HYDROMORPHONE HYDROCHLORIDE 1 MG/ML
0.2 INJECTION, SOLUTION INTRAMUSCULAR; INTRAVENOUS; SUBCUTANEOUS EVERY 5 MIN PRN
Status: DISCONTINUED | OUTPATIENT
Start: 2017-10-31 | End: 2017-11-01 | Stop reason: HOSPADM

## 2017-10-31 RX ORDER — SUCCINYLCHOLINE CHLORIDE 20 MG/ML
INJECTION INTRAMUSCULAR; INTRAVENOUS
Status: DISCONTINUED | OUTPATIENT
Start: 2017-10-31 | End: 2017-10-31

## 2017-10-31 RX ORDER — TAMSULOSIN HYDROCHLORIDE 0.4 MG/1
0.4 CAPSULE ORAL DAILY
Status: DISCONTINUED | OUTPATIENT
Start: 2017-11-01 | End: 2017-11-01 | Stop reason: HOSPADM

## 2017-10-31 RX ORDER — FUROSEMIDE 10 MG/ML
INJECTION INTRAMUSCULAR; INTRAVENOUS
Status: DISCONTINUED | OUTPATIENT
Start: 2017-10-31 | End: 2017-10-31

## 2017-10-31 RX ORDER — SODIUM CHLORIDE 9 MG/ML
INJECTION, SOLUTION INTRAVENOUS CONTINUOUS
Status: DISCONTINUED | OUTPATIENT
Start: 2017-10-31 | End: 2017-11-01 | Stop reason: HOSPADM

## 2017-10-31 RX ORDER — LIDOCAINE HCL/PF 100 MG/5ML
SYRINGE (ML) INTRAVENOUS
Status: DISCONTINUED | OUTPATIENT
Start: 2017-10-31 | End: 2017-10-31

## 2017-10-31 RX ORDER — FUROSEMIDE 10 MG/ML
20 INJECTION INTRAMUSCULAR; INTRAVENOUS ONCE
Status: COMPLETED | OUTPATIENT
Start: 2017-11-01 | End: 2017-11-01

## 2017-10-31 RX ORDER — HEPARIN SODIUM 10000 [USP'U]/100ML
INJECTION, SOLUTION INTRAVENOUS CONTINUOUS PRN
Status: DISCONTINUED | OUTPATIENT
Start: 2017-10-31 | End: 2017-10-31

## 2017-10-31 RX ORDER — ONDANSETRON 2 MG/ML
INJECTION INTRAMUSCULAR; INTRAVENOUS
Status: DISCONTINUED | OUTPATIENT
Start: 2017-10-31 | End: 2017-10-31

## 2017-10-31 RX ORDER — PROTAMINE SULFATE 10 MG/ML
INJECTION, SOLUTION INTRAVENOUS
Status: DISCONTINUED | OUTPATIENT
Start: 2017-10-31 | End: 2017-10-31

## 2017-10-31 RX ORDER — HEPARIN SODIUM 1000 [USP'U]/ML
INJECTION, SOLUTION INTRAVENOUS; SUBCUTANEOUS
Status: DISCONTINUED | OUTPATIENT
Start: 2017-10-31 | End: 2017-10-31

## 2017-10-31 RX ORDER — MIDAZOLAM HYDROCHLORIDE 1 MG/ML
INJECTION, SOLUTION INTRAMUSCULAR; INTRAVENOUS
Status: DISCONTINUED | OUTPATIENT
Start: 2017-10-31 | End: 2017-10-31

## 2017-10-31 RX ORDER — PHENYLEPHRINE HYDROCHLORIDE 10 MG/ML
INJECTION INTRAVENOUS
Status: DISCONTINUED | OUTPATIENT
Start: 2017-10-31 | End: 2017-10-31

## 2017-10-31 RX ORDER — ROCURONIUM BROMIDE 10 MG/ML
INJECTION, SOLUTION INTRAVENOUS
Status: DISCONTINUED | OUTPATIENT
Start: 2017-10-31 | End: 2017-10-31

## 2017-10-31 RX ORDER — DOPAMINE HYDROCHLORIDE 160 MG/100ML
5 INJECTION, SOLUTION INTRAVENOUS CONTINUOUS
Status: DISCONTINUED | OUTPATIENT
Start: 2017-10-31 | End: 2017-11-01 | Stop reason: HOSPADM

## 2017-10-31 RX ORDER — DOPAMINE HYDROCHLORIDE 160 MG/100ML
INJECTION, SOLUTION INTRAVENOUS CONTINUOUS PRN
Status: DISCONTINUED | OUTPATIENT
Start: 2017-10-31 | End: 2017-10-31

## 2017-10-31 RX ORDER — PROPOFOL 10 MG/ML
VIAL (ML) INTRAVENOUS
Status: DISCONTINUED | OUTPATIENT
Start: 2017-10-31 | End: 2017-10-31

## 2017-10-31 RX ORDER — ATORVASTATIN CALCIUM 10 MG/1
10 TABLET, FILM COATED ORAL NIGHTLY
Status: DISCONTINUED | OUTPATIENT
Start: 2017-10-31 | End: 2017-11-01 | Stop reason: HOSPADM

## 2017-10-31 RX ORDER — SOTALOL HYDROCHLORIDE 80 MG/1
80 TABLET ORAL 2 TIMES DAILY
Status: DISCONTINUED | OUTPATIENT
Start: 2017-10-31 | End: 2017-11-01 | Stop reason: HOSPADM

## 2017-10-31 RX ORDER — ALBUTEROL SULFATE 90 UG/1
AEROSOL, METERED RESPIRATORY (INHALATION)
Status: DISCONTINUED | OUTPATIENT
Start: 2017-10-31 | End: 2017-10-31

## 2017-10-31 RX ORDER — ONDANSETRON 2 MG/ML
4 INJECTION INTRAMUSCULAR; INTRAVENOUS ONCE AS NEEDED
Status: ACTIVE | OUTPATIENT
Start: 2017-10-31 | End: 2017-10-31

## 2017-10-31 RX ORDER — DIPHENHYDRAMINE HYDROCHLORIDE 50 MG/ML
25 INJECTION INTRAMUSCULAR; INTRAVENOUS EVERY 6 HOURS PRN
Status: DISCONTINUED | OUTPATIENT
Start: 2017-10-31 | End: 2017-11-01 | Stop reason: HOSPADM

## 2017-10-31 RX ORDER — DEXAMETHASONE SODIUM PHOSPHATE 4 MG/ML
INJECTION, SOLUTION INTRA-ARTICULAR; INTRALESIONAL; INTRAMUSCULAR; INTRAVENOUS; SOFT TISSUE
Status: DISCONTINUED | OUTPATIENT
Start: 2017-10-31 | End: 2017-10-31

## 2017-10-31 RX ADMIN — PROTAMINE SULFATE 10 MG: 10 INJECTION, SOLUTION INTRAVENOUS at 12:10

## 2017-10-31 RX ADMIN — PROPOFOL 50 MG: 10 INJECTION, EMULSION INTRAVENOUS at 10:10

## 2017-10-31 RX ADMIN — PROPOFOL 50 MG: 10 INJECTION, EMULSION INTRAVENOUS at 07:10

## 2017-10-31 RX ADMIN — DOPAMINE HYDROCHLORIDE IN DEXTROSE 4 MCG/KG/MIN: 1.6 INJECTION, SOLUTION INTRAVENOUS at 08:10

## 2017-10-31 RX ADMIN — ROCURONIUM BROMIDE 5 MG: 10 INJECTION, SOLUTION INTRAVENOUS at 07:10

## 2017-10-31 RX ADMIN — LIDOCAINE HYDROCHLORIDE 100 MG: 20 INJECTION, SOLUTION INTRAVENOUS at 07:10

## 2017-10-31 RX ADMIN — HEPARIN SODIUM AND DEXTROSE 5000 UNITS/HR: 10000; 5 INJECTION INTRAVENOUS at 08:10

## 2017-10-31 RX ADMIN — PHENYLEPHRINE HYDROCHLORIDE 100 MCG: 10 INJECTION INTRAVENOUS at 12:10

## 2017-10-31 RX ADMIN — PROPOFOL 100 MG: 10 INJECTION, EMULSION INTRAVENOUS at 07:10

## 2017-10-31 RX ADMIN — SODIUM CHLORIDE 1000 ML: 0.9 INJECTION, SOLUTION INTRAVENOUS at 06:10

## 2017-10-31 RX ADMIN — APIXABAN 5 MG: 2.5 TABLET, FILM COATED ORAL at 08:10

## 2017-10-31 RX ADMIN — SODIUM CHLORIDE, SODIUM GLUCONATE, SODIUM ACETATE, POTASSIUM CHLORIDE, MAGNESIUM CHLORIDE, SODIUM PHOSPHATE, DIBASIC, AND POTASSIUM PHOSPHATE: .53; .5; .37; .037; .03; .012; .00082 INJECTION, SOLUTION INTRAVENOUS at 07:10

## 2017-10-31 RX ADMIN — FUROSEMIDE 20 MG: 10 INJECTION, SOLUTION INTRAMUSCULAR; INTRAVENOUS at 12:10

## 2017-10-31 RX ADMIN — PROPOFOL 50 MG: 10 INJECTION, EMULSION INTRAVENOUS at 09:10

## 2017-10-31 RX ADMIN — MIDAZOLAM 2 MG: 1 INJECTION INTRAMUSCULAR; INTRAVENOUS at 07:10

## 2017-10-31 RX ADMIN — SUCCINYLCHOLINE CHLORIDE 120 MG: 20 INJECTION, SOLUTION INTRAMUSCULAR; INTRAVENOUS at 07:10

## 2017-10-31 RX ADMIN — ONDANSETRON 4 MG: 2 INJECTION INTRAMUSCULAR; INTRAVENOUS at 12:10

## 2017-10-31 RX ADMIN — SOTALOL HYDROCHLORIDE 80 MG: 80 TABLET ORAL at 08:10

## 2017-10-31 RX ADMIN — HEPARIN SODIUM 12000 UNITS: 1000 INJECTION INTRAVENOUS; SUBCUTANEOUS at 08:10

## 2017-10-31 RX ADMIN — DEXAMETHASONE SODIUM PHOSPHATE 4 MG: 4 INJECTION, SOLUTION INTRAMUSCULAR; INTRAVENOUS at 07:10

## 2017-10-31 RX ADMIN — PHENYLEPHRINE HYDROCHLORIDE 0.25 MCG/KG/MIN: 10 INJECTION INTRAVENOUS at 07:10

## 2017-10-31 RX ADMIN — HEPARIN SODIUM 2000 UNITS: 1000 INJECTION INTRAVENOUS; SUBCUTANEOUS at 10:10

## 2017-10-31 RX ADMIN — PHENYLEPHRINE HYDROCHLORIDE 100 MCG: 10 INJECTION INTRAVENOUS at 07:10

## 2017-10-31 RX ADMIN — FENTANYL CITRATE 100 MCG: 50 INJECTION, SOLUTION INTRAMUSCULAR; INTRAVENOUS at 07:10

## 2017-10-31 RX ADMIN — PHENYLEPHRINE HYDROCHLORIDE 100 MCG: 10 INJECTION INTRAVENOUS at 09:10

## 2017-10-31 RX ADMIN — HEPARIN SODIUM 7000 UNITS: 1000 INJECTION INTRAVENOUS; SUBCUTANEOUS at 08:10

## 2017-10-31 RX ADMIN — PHENYLEPHRINE HYDROCHLORIDE 200 MCG: 10 INJECTION INTRAVENOUS at 12:10

## 2017-10-31 RX ADMIN — ALBUTEROL SULFATE 6 PUFF: 90 AEROSOL, METERED RESPIRATORY (INHALATION) at 07:10

## 2017-10-31 RX ADMIN — ATORVASTATIN CALCIUM 10 MG: 10 TABLET, FILM COATED ORAL at 08:10

## 2017-10-31 NOTE — ANESTHESIA PROCEDURE NOTES
Arterial    Diagnosis: atrial tachy  Doctor requesting consult: Joy    Patient location during procedure: done in OR  Procedure start time: 10/31/2017 7:34 AM  Timeout: 10/31/2017 7:33 AM  Procedure end time: 10/31/2017 7:36 AM  Staffing  Anesthesiologist: YANETH LUGO  Resident/CRNA: LUISITO GODOY  Performed: resident/CRNA   Anesthesiologist was present at the time of the procedure.  Preanesthetic Checklist  Completed: patient identified, site marked, surgical consent, pre-op evaluation, timeout performed, IV checked, risks and benefits discussed, monitors and equipment checked and anesthesia consent givenArterial  Skin Prep: chlorhexidine gluconate  Local Infiltration: none  Orientation: right  Location: radial  Catheter Size: 20 G  Catheter placement by Anatomical landmarks. Heme positive aspiration all ports.Insertion Attempts: 1  Assessment  Dressing: secured with tape and tegaderm  Patient: Tolerated well

## 2017-10-31 NOTE — TRANSFER OF CARE
Anesthesia Transfer of Care Note    Patient: Luke Borden    Procedure(s) Performed: Procedure(s) (LRB):  ABLATION (N/A)    Patient location: PACU    Anesthesia Type: general    Transport from OR: Transported from OR on 6-10 L/min O2 by face mask with adequate spontaneous ventilation    Post pain: adequate analgesia    Post assessment: no apparent anesthetic complications and tolerated procedure well    Post vital signs: stable    Level of consciousness: awake, alert and oriented    Nausea/Vomiting: no nausea/vomiting    Complications: none    Transfer of care protocol was followed      Last vitals:   Visit Vitals: Dopamine at 5 mcg/kg/min IV.  aware  /52   Pulse 76   Temp 97.9   Resp 20   Ht 6' (1.829 m)   Wt 83.9 kg (185 lb)   SpO2 98%   BMI 25.09 kg/m²

## 2017-10-31 NOTE — PLAN OF CARE
Problem: Fall Risk (Adult)  Goal: Absence of Falls  Patient will demonstrate the desired outcomes by discharge/transition of care.   Outcome: Ongoing (interventions implemented as appropriate)  Safety issues reviewed with patient and wife

## 2017-10-31 NOTE — PROGRESS NOTES
Patient admitted to recovery see [facility] for complete assessment pacu bcg's maintained safety measures verified patient instructed on pain scale also ekg done and placed in patient's chart.  Also called patient's wife and updated on patient location also dopamine gtt going at 5u/kg/min. As per patient came out of procedure with this rate and paul kapoor rn spoke with dr. lujan and dr. lujan ok with this weaken as patient wakes up will follow orders and monitor.

## 2017-10-31 NOTE — NURSING TRANSFER
Nursing Transfer Note      10/31/2017     Transfer To: [id number]    Transfer via stretcher    Transfer with cardiac monitoring    Transported by rn    Medicines sent: none    Chart send with patient: Yes    Notified: nurse    Patient reassessed at: see epic (date,

## 2017-10-31 NOTE — H&P
"      Electrophysiology H+P  Attending Physician: Shawn Hamilton MD  Chief Complaint: Non-sustained AT     HPI:   Luke Borden is a 61 y.o. male who presents for follow-up of PAC's (follow up discuss ablation) and Cardiomyopathy     HPI  62 yo male with NICM, PAC's, PVC's, nonsustained atrial tachycardia.  Primary Cardiologist is Dr. Coleman.  Retired, [employer] .  Presented to PCP for routine visit, noted to have irregular heart beat.  Work-up revealed ectopy and cardiomyopathy.  Echo 2/16/17 EF 30-35%  Holter 2/17/17 2004 PVC's, frequent PAC's and "frequent runs of AT/AF."  Placed on beta blocker and ace inhibitor.  Echo 4/6/17 EF 30% LVEDD 6.2 cm, ALEXIS 28, moderate TR.  48 holter 4/6/17 1605 PVC's, 2431 PAC's frequent 3-20 beat runs of atrial tachycardia.  LHC 4/24/17 normal coronary arteries.  Admitted for Sotalol loading, now on 80 mg BID.  ECG reveals nsr with continued ectopy, both narrow and wide.  Wide beats more c/w PAC's with aberration.  We elected to optimize Sleep Apnea management and weight reduction, and reassess.  Holter 8/3/17 2065 PAC's with nearly incessant runs in nonsustained atrial tachycardia, 5492 PVC's  Optimized in terms of CPAP.  Continues to lose weight.  Overall feeling well.    CASSANDRA yesterday with no MARGARITA or LA thrombus noted.    ROS:    Constitution: Negative for fever, chills, weight loss or gain.   HENT: Negative for sore throat, rhinorrhea, or headache.  Eyes: Negative for blurred or double vision.   Cardiovascular: See above  Pulmonary: Positive for SOB   Gastrointestinal: Negative for abdominal pain, nausea, vomiting, or diarrhea.   : Negative for dysuria.   Neurological: Negative for focal weakness or sensory changes.  PMH:     Past Medical History:   Diagnosis Date    Cardiomyopathy     Decreased cardiac ejection fraction     (30% 4/6/17)    Hypercholesteremia     JEANINE on CPAP     PAC (premature atrial contraction)      Past Surgical History:   Procedure " Laterality Date    CARDIAC CATHETERIZATION  04/2017    angiogram/[facility], [place]    TONSILLECTOMY      VASECTOMY       Allergies:   Review of patient's allergies indicates:  No Known Allergies  Medications:     Current Facility-Administered Medications on File Prior to Encounter   Medication Dose Route Frequency Provider Last Rate Last Dose    [COMPLETED] propofol (DIPRIVAN) 10 mg/mL infusion             [DISCONTINUED] benzocaine 20 % oral spray             [DISCONTINUED] lidocaine HCl 2% (XYLOCAINE) 2 % oral solution              Current Outpatient Prescriptions on File Prior to Encounter   Medication Sig Dispense Refill    apixaban 5 mg Tab Take 1 tablet (5 mg total) by mouth 2 (two) times daily. 60 tablet 6    atorvastatin (LIPITOR) 20 MG tablet Take 1 tablet (20 mg total) by mouth once daily. (Patient taking differently: Take 10 mg by mouth every evening. ) 90 tablet 3    cholecalciferol, vitamin D3, (VITAMIN D3) 2,000 unit Cap Take 1 capsule by mouth once daily.      ramipril (ALTACE) 1.25 MG capsule Take 1 capsule (1.25 mg total) by mouth every evening. 90 capsule 4    tamsulosin (FLOMAX) 0.4 mg Cp24 Take 1 capsule (0.4 mg total) by mouth once daily. 30 capsule 11       Inpatient Medications   Continuous Infusions:   sodium chloride 0.9%       Scheduled Meds:   PRN Meds:     Social History:     Social History   Substance Use Topics    Smoking status: Never Smoker    Smokeless tobacco: Never Used    Alcohol use Yes      Comment: rarely     Family History:     Family History   Problem Relation Age of Onset    Hypertension Mother     Cancer Mother      breast    Heart disease Father     Hypertension Father     Cancer Father      melanoma behind right ear    Asthma Maternal Uncle      Physical Exam:     Vitals:  Pulse:  [63]   BP: (144)/(80)  I/O's:  No intake or output data in the 24 hours ending 10/31/17 0624     Constitutional: NAD, conversant  HEENT: Sclera anicteric, PERRLA, EOMI  Neck: No JVD,  no carotid bruits  CV: RRR, no murmur, normal S1/S2, occasional extrasystoles  Pulm: CTAB, no wheezes, rales, or ronchi  GI: Abdomen soft, NTND, +BS  Extremities: No LE edema, warm and well perfused  Skin: No ecchymosis, erythema, or ulcers  Psych: AOx3, appropriate affect  Neuro: No gross deficits    Labs:       Recent Labs  Lab 10/25/17  0748      K 4.7      CO2 31*   BUN 19   CREATININE 1.1   ANIONGAP 6*     No results for input(s): AST, ALT, ALKPHOS, BILITOT, BILIDIR, ALBUMIN in the last 168 hours.  No results for input(s): TROPONINI, BNP in the last 168 hours.   Recent Labs  Lab 10/25/17  0748   WBC 5.83   HGB 14.1   HCT 42.3      GRAN 62.7  3.7       Recent Labs  Lab 10/25/17  0748   INR 1.1     Lab Results   Component Value Date    CHOL 169 02/10/2017    HDL 67 02/10/2017    LDLCALC 85.4 02/10/2017    TRIG 83 02/10/2017     Lab Results   Component Value Date    HGBA1C 6.0 12/24/2008        Micro:  Blood Cultures  No results found for: LABBLOO  Urine Cultures  No results found for: LABURIN    Imaging:     EF   Date Value Ref Range Status   10/30/2017 50 55 - 65    09/22/2017 35 (A) 55 - 65    04/06/2017 30 (A) 55 - 65    02/16/2017 30 (A) 55 - 65      Assessment:     1. PAC (premature atrial contraction)    2. Nonischemic cardiomyopathy    3. Premature ventricular contractions (PVCs) (VPCs)    4. SVT (supraventricular tachycardia)    5. Obstructive sleep apnea syndrome        Plan:     Frequent nonsustained atrial tachycardia, ? RSPV focus.  There is concern that this is resulting in rate related cardiomyopathy.  He has been optimized in terms of weight and sleep apnea management, and continues to have frequent nonsustained AT despite this + Sotalol.  If if fact this is a RSPV focus, would recommend PVI.  Discussed the rationale for this with the patient.  If not PV focus >> focal RFA.  We will be prepared for PVI this morning.  Risks and benefits discussed.  CTA of chest to delineate PV  anatomy completed, has LSPV 1.9 cm, LIPV 1.7 cm, RSPV 1.6 cm, RMPV 1.1, and RIPV 2.0 cm, esophagus near LSPV and LIPV only.  CASSANDRA yesterday with no LA or MARGARITA thrombus.  RFA PVI vs. RFA focus this AM for frequent non-sustained AT  Has been initiated with Eliquis 5 mg BID one month ago, held last night.  Sotalol has been held for 3 days    The risks, benefits, and alternatives of RFA were discussed with the patient. All questions were answered and informed consent was obtained. I had a detailed discussion with the patient regarding risk of stroke, MI, bleeding access site complications, renal failure, emergent need for heart surgery, acute limb complications including ischemia and loss, contrast allergy and death. Patient understands the risks and benefits of the procedure and wishes to proceed.    Signed:  Dean Irwin MD  Cardiology Fellow, PGY-5  Pager: [phone]  10/31/2017 6:24 AM

## 2017-10-31 NOTE — PLAN OF CARE
Problem: Cardiac Rhythm Management Device (Adult)  Goal: Signs and Symptoms of Listed Potential Problems Will be Absent, Minimized or Managed (Cardiac Rhythm Management Device)  Signs and symptoms of listed potential problems will be absent, minimized or managed by discharge/transition of care (reference Cardiac Rhythm Management Device (Adult) CPG).   Outcome: Ongoing (interventions implemented as appropriate)  Bilateral groin sites with sutures intact

## 2017-10-31 NOTE — NURSING
Received from PACU, transferred to bed by staff. AA+O X 4.MAEW, bilateral groin sites CDI. +2 pulses to bilateral LE. Telemetry is NSR. Oriented to environment, SR up. Family brought to bedside.

## 2017-10-31 NOTE — PLAN OF CARE
Problem: Patient Care Overview  Goal: Plan of Care Review  Outcome: Ongoing (interventions implemented as appropriate)  Pt arrived to unit accompanied by spouse. Oriented pt to rm and unit.  Pre op orders and assessment initiated.  Pt remains npo.  In no acute distress and verbalizes no complaints.  Will continue to monitor.

## 2017-10-31 NOTE — PLAN OF CARE
Problem: Patient Care Overview  Goal: Plan of Care Review  See epic  Goal: Individualization & Mutuality  See epic    Comments: See epic

## 2017-10-31 NOTE — PROGRESS NOTES
Bilateral groin sutures removed.  Gauze and tegaderm applied to bilateral groin sites.  Will continue to monitor.

## 2017-10-31 NOTE — PROGRESS NOTES
Post EP Procedure Note  Referring Physician: Shawn Hamilton MD  Procedure: ABLATION (N/A)       Access: Right CFV/Left CFV    See full report for further details    Intervention:     Successful RFA PVI    Post Cath Exam:   /83 (BP Location: Right arm, Patient Position: Lying)   Pulse 74   Temp 97 °F (36.1 °C) (Oral)   Resp 18   Ht 6' (1.829 m)   Wt 83.9 kg (185 lb)   SpO2 98%   BMI 25.09 kg/m²   No unusual pain, hematoma, thrill or bruit at vascular access site.  Distal pulse present without signs of ischemia.    Recommendations:   - Routine post-cath care  - Lasix 20 mg given in lab, give 20 mg IVP omorrow AM  - Restart Eliquis 6 hours post-procedure (6:00 PM tonight)  - STAT ECG on the floor  - Restart home dose Sotalol tonight  - Sutures removed 4 hours post-procedure    Signed:  Dean Irwin MD  Cardiology Fellow, PGY-5  10/31/2017 12:13 PM

## 2017-10-31 NOTE — ANESTHESIA PREPROCEDURE EVALUATION
10/31/2017  Luke Borden is a 61 y.o., male.    Anesthesia Evaluation         Review of Systems  Anesthesia Hx:  No problems with previous Anesthesia   Social:  Non-Smoker    Cardiovascular:   Exercise tolerance: good Denies Hypertension.  Denies CAD.   Dysrhythmias   Denies Angina.  Functional Capacity Can you climb two flights of stairs? ==> Yes    Pulmonary:   Asthma Denies Recent URI. Sleep Apnea    Renal/:  Renal/ Normal     Hepatic/GI:   Denies PUD. Hiatal Hernia, Denies GERD. Denies Liver Disease.  Denies Hepatitis.    Neurological:   Denies CVA. Denies Seizures.    Endocrine:   Denies Diabetes. Denies Hypothyroidism.        Physical Exam  General:  Well nourished    Airway/Jaw/Neck:  Airway Findings: Mouth Opening: Normal Tongue: Normal  General Airway Assessment: Adult  Mallampati: I  TM Distance: Normal, at least 6 cm  Jaw/Neck Findings:  Neck ROM: Normal ROM  Neck Findings:     Eyes/Ears/Nose:  EYES/EARS/NOSE FINDINGS: Normal   Dental:  Dental Findings: In tact   Chest/Lungs:  Chest/Lungs Findings: Clear to auscultation     Heart/Vascular:  Heart Findings: Rate: Normal  Rhythm: Regular Rhythm  Sounds: Normal        Mental Status:  Mental Status Findings:  Alert and Oriented         Anesthesia Plan  Type of Anesthesia, risks & benefits discussed:  Anesthesia Type:  general  Patient's Preference: Proceed with anesthesia understanding that the risks are very small but could be serious or life threatening.  Intra-op Monitoring Plan: standard ASA monitors  Intra-op Monitoring Plan Comments:   Post Op Pain Control Plan:   Post Op Pain Control Plan Comments:   Induction:   IV  Beta Blocker:  Patient is on a Beta-Blocker and has received one dose within the past 24 hours (No further documentation required).       Informed Consent: Patient understands risks and agrees with Anesthesia plan.   Questions answered. Anesthesia consent signed with patient.  ASA Score: 2     Day of Surgery Review of History & Physical: I have interviewed and examined the patient. I have reviewed the patient's H&P dated:            Ready For Surgery From Anesthesia Perspective.

## 2017-10-31 NOTE — PROGRESS NOTES
Dr. Hamilton here seeing patient and speaking with patient. Patient is weaned off of dopamine and continues to do well and bp within parameters.

## 2017-11-01 ENCOUNTER — TELEPHONE (OUTPATIENT)
Dept: ELECTROPHYSIOLOGY | Facility: CLINIC | Age: 62
End: 2017-11-01

## 2017-11-01 ENCOUNTER — TELEPHONE (OUTPATIENT)
Dept: CARDIOLOGY | Facility: CLINIC | Age: 62
End: 2017-11-01

## 2017-11-01 VITALS
WEIGHT: 185 LBS | BODY MASS INDEX: 25.06 KG/M2 | SYSTOLIC BLOOD PRESSURE: 120 MMHG | DIASTOLIC BLOOD PRESSURE: 70 MMHG | RESPIRATION RATE: 18 BRPM | HEIGHT: 72 IN | HEART RATE: 59 BPM | TEMPERATURE: 98 F | OXYGEN SATURATION: 95 %

## 2017-11-01 LAB
ANION GAP SERPL CALC-SCNC: 8 MMOL/L
BASOPHILS # BLD AUTO: 0.02 K/UL
BASOPHILS NFR BLD: 0.2 %
BUN SERPL-MCNC: 12 MG/DL
CALCIUM SERPL-MCNC: 8.2 MG/DL
CHLORIDE SERPL-SCNC: 108 MMOL/L
CO2 SERPL-SCNC: 24 MMOL/L
CREAT SERPL-MCNC: 0.8 MG/DL
DIFFERENTIAL METHOD: ABNORMAL
EOSINOPHIL # BLD AUTO: 0.1 K/UL
EOSINOPHIL NFR BLD: 0.8 %
ERYTHROCYTE [DISTWIDTH] IN BLOOD BY AUTOMATED COUNT: 13.5 %
EST. GFR  (AFRICAN AMERICAN): >60 ML/MIN/1.73 M^2
EST. GFR  (NON AFRICAN AMERICAN): >60 ML/MIN/1.73 M^2
GLUCOSE SERPL-MCNC: 104 MG/DL
HCT VFR BLD AUTO: 33.1 %
HGB BLD-MCNC: 11.2 G/DL
IMM GRANULOCYTES # BLD AUTO: 0.04 K/UL
IMM GRANULOCYTES NFR BLD AUTO: 0.3 %
LYMPHOCYTES # BLD AUTO: 1.1 K/UL
LYMPHOCYTES NFR BLD: 9.5 %
MCH RBC QN AUTO: 32.9 PG
MCHC RBC AUTO-ENTMCNC: 33.8 G/DL
MCV RBC AUTO: 97 FL
MONOCYTES # BLD AUTO: 1.3 K/UL
MONOCYTES NFR BLD: 10.9 %
NEUTROPHILS # BLD AUTO: 9 K/UL
NEUTROPHILS NFR BLD: 78.3 %
NRBC BLD-RTO: 0 /100 WBC
PLATELET # BLD AUTO: 184 K/UL
PMV BLD AUTO: 10.6 FL
POTASSIUM SERPL-SCNC: 4 MMOL/L
RBC # BLD AUTO: 3.4 M/UL
SODIUM SERPL-SCNC: 140 MMOL/L
WBC # BLD AUTO: 11.47 K/UL

## 2017-11-01 PROCEDURE — 25000003 PHARM REV CODE 250: Performed by: INTERNAL MEDICINE

## 2017-11-01 PROCEDURE — 36415 COLL VENOUS BLD VENIPUNCTURE: CPT

## 2017-11-01 PROCEDURE — 85025 COMPLETE CBC W/AUTO DIFF WBC: CPT

## 2017-11-01 PROCEDURE — 63600175 PHARM REV CODE 636 W HCPCS: Performed by: INTERNAL MEDICINE

## 2017-11-01 PROCEDURE — 80048 BASIC METABOLIC PNL TOTAL CA: CPT

## 2017-11-01 RX ADMIN — FUROSEMIDE 20 MG: 10 INJECTION, SOLUTION INTRAMUSCULAR; INTRAVENOUS at 05:11

## 2017-11-01 RX ADMIN — SOTALOL HYDROCHLORIDE 80 MG: 80 TABLET ORAL at 07:11

## 2017-11-01 RX ADMIN — TAMSULOSIN HYDROCHLORIDE 0.4 MG: 0.4 CAPSULE ORAL at 07:11

## 2017-11-01 NOTE — ANESTHESIA POSTPROCEDURE EVALUATION
Anesthesia Post Evaluation    Patient: Luke Borden    Procedure(s) Performed: Procedure(s) (LRB):  ABLATION (N/A)    Final Anesthesia Type: general  Patient location during evaluation: PACU  Patient participation: Yes- Able to Participate  Level of consciousness: awake and alert  Post-procedure vital signs: reviewed and stable  Pain management: adequate  Airway patency: patent  PONV status at discharge: No PONV  Anesthetic complications: no      Cardiovascular status: blood pressure returned to baseline  Respiratory status: unassisted  Hydration status: euvolemic  Follow-up not needed.        Visit Vitals  /70 (BP Location: Left arm, Patient Position: Sitting)   Pulse (!) 59   Temp 36.6 °C (97.9 °F) (Oral)   Resp 18   Ht 6' (1.829 m)   Wt 83.9 kg (185 lb)   SpO2 95%   BMI 25.09 kg/m²       Pain/Wendy Score: Pain Assessment Performed: Yes (11/1/2017  7:49 AM)  Presence of Pain: denies (11/1/2017  7:49 AM)  Wendy Score: 10 (10/31/2017  3:00 PM)

## 2017-11-01 NOTE — DISCHARGE SUMMARY
Discharge Summary  Electrophysiology      Admit Date: 10/31/2017    Discharge Date:  11/1/2017    Attending Physician: Shawn Hamilton MD    Discharge Physician: Dean Irwin MD    Principal Diagnoses: Non sustained AT, frequent PAC's, NICM  Indication for Admission: ABLATION (N/A)    Discharged Condition: Good    Hospital Course:   Patient is a 60 yo male with NICM, PAC's, PVC's, nonsustained atrial tachycardia who's primary cardiologist is Dr. Coleman. He presented for outpatient RFA for frequent runs of non-sustained AT. He underwent a successful RFA of his pulmonary veins. Patient was mildly volume overloaded post-procedure, and received a dose of 20 mg Lasix IVP immediately after the procedure and once again the morning of discharge. His Eliquis and Sotalol were resumed the evening after his procedure. He was feeling well on the morning of discharge, no complaints overnight.    Outpatient Plan:  - Follow up with Dr. Hamilton in 2 months  - There were no medication changes    Diet: Cardiac diet    Activity: Ad attyana, wound care instructions provided    Disposition: Home or Self Care    Discharge Medications:      Medication List      CHANGE how you take these medications    atorvastatin 20 MG tablet  Commonly known as:  LIPITOR  Take 1 tablet (20 mg total) by mouth once daily.  What changed:  · how much to take  · when to take this        CONTINUE taking these medications    apixaban 5 mg Tab  Take 1 tablet (5 mg total) by mouth 2 (two) times daily.     ramipril 1.25 MG capsule  Commonly known as:  ALTACE  Take 1 capsule (1.25 mg total) by mouth every evening.     sotalol 80 MG tablet  Commonly known as:  BETAPACE  TAKE 1 TABLET TWICE A DAY     tamsulosin 0.4 mg Cp24  Commonly known as:  FLOMAX  Take 1 capsule (0.4 mg total) by mouth once daily.     VITAMIN D3 2,000 unit Cap  Generic drug:  cholecalciferol (vitamin D3)          Follow Up:  Follow-up Information     Shawn Hamilton MD In 2 months.    Specialties:   Electrophysiology, Cardiology  Contact information:  [address]  [facility] [address]  [phone]

## 2017-11-01 NOTE — TELEPHONE ENCOUNTER
Please advise:  Pt wants to know if he should still be taking the Ramipril 1.25 mg medication. Pt had ablasion done yesterday.

## 2017-11-01 NOTE — PLAN OF CARE
Problem: Cardiac Rhythm Management Device (Adult)  Goal: Signs and Symptoms of Listed Potential Problems Will be Absent, Minimized or Managed (Cardiac Rhythm Management Device)  Signs and symptoms of listed potential problems will be absent, minimized or managed by discharge/transition of care (reference Cardiac Rhythm Management Device (Adult) CPG).   Outcome: Ongoing (interventions implemented as appropriate)  Pt states adequate pain control. Call bell within reach and enc pt to call when in need. Safety and fall prec in place. Pt states no needs at current. Will monitor.

## 2017-11-01 NOTE — TELEPHONE ENCOUNTER
Spoke to patient, informed him of Dr. Coleman orders to resume all meds, patient verbalized understanding.

## 2017-11-01 NOTE — TELEPHONE ENCOUNTER
----- Message from Brandi Maradiaga sent at 11/1/2017  9:05 AM CDT -----  Contact: patient   Please call pt at [phone]. Patient was discharged this morning and he wants to know when to f/u with Dr Hamilton?    Thank you

## 2017-11-01 NOTE — TELEPHONE ENCOUNTER
----- Message from Erin Hernandez sent at 11/1/2017  9:13 AM CDT -----  Contact: pt  Pt would like to know aftercare instruction after his procedure and  does he need to continue the ramipril that the Dr gave him ....[phone]

## 2017-11-01 NOTE — PLAN OF CARE
Patient discharged per MD orders. Instructions given on medications, wound care, activity, signs of infection, when to call MD, and follow up appointments. Pt verbalized understanding. Telemetry and PIV removed. Patient and family refused transport, ambulated off unit.

## 2017-11-02 LAB
POC ACTIVATED CLOTTING TIME K: 257 SEC (ref 74–137)
POC ACTIVATED CLOTTING TIME K: 340 SEC (ref 74–137)
POC ACTIVATED CLOTTING TIME K: 340 SEC (ref 74–137)
POC ACTIVATED CLOTTING TIME K: 351 SEC (ref 74–137)
POC ACTIVATED CLOTTING TIME K: 351 SEC (ref 74–137)
POC ACTIVATED CLOTTING TIME K: 373 SEC (ref 74–137)
POC ACTIVATED CLOTTING TIME K: 373 SEC (ref 74–137)
POC ACTIVATED CLOTTING TIME K: 384 SEC (ref 74–137)
POC ACTIVATED CLOTTING TIME K: 422 SEC (ref 74–137)
SAMPLE: ABNORMAL

## 2017-11-16 ENCOUNTER — TELEPHONE (OUTPATIENT)
Dept: FAMILY MEDICINE | Facility: CLINIC | Age: 62
End: 2017-11-16

## 2017-11-16 NOTE — TELEPHONE ENCOUNTER
Advised pt. Dr. Candelario is out of office. Offered pt. appt with Urology/Dr. Ruiz to discuss results of CT scan. Pt  declined appt.

## 2017-11-17 ENCOUNTER — TELEPHONE (OUTPATIENT)
Dept: FAMILY MEDICINE | Facility: CLINIC | Age: 62
End: 2017-11-17

## 2017-11-17 NOTE — TELEPHONE ENCOUNTER
----- Message from Soledad Alejandra sent at 11/17/2017  1:06 PM CST -----  Contact: patient  Patient calling to speak with the Nurse about his upcoming appt on Tuesday, 11/21/17. Please advise. Call to pod. No answer.  Call back   Thanks!

## 2017-11-17 NOTE — TELEPHONE ENCOUNTER
Spoke with pt he was concerned about a cyst on his bladder and wanted to know if he needed to schedule an appointment with urology. I advised pt to discuss his concerns with Dr. Ruiz at his next appointment 11/21/17. Pt verbalized understanding

## 2017-11-21 ENCOUNTER — OFFICE VISIT (OUTPATIENT)
Dept: FAMILY MEDICINE | Facility: CLINIC | Age: 62
End: 2017-11-21
Payer: COMMERCIAL

## 2017-11-21 VITALS
RESPIRATION RATE: 16 BRPM | SYSTOLIC BLOOD PRESSURE: 118 MMHG | BODY MASS INDEX: 24.79 KG/M2 | HEIGHT: 72 IN | WEIGHT: 183 LBS | DIASTOLIC BLOOD PRESSURE: 78 MMHG | HEART RATE: 64 BPM

## 2017-11-21 DIAGNOSIS — L30.9 ECZEMA, UNSPECIFIED TYPE: ICD-10-CM

## 2017-11-21 DIAGNOSIS — N28.1 RENAL CYST: Primary | ICD-10-CM

## 2017-11-21 PROCEDURE — 99999 PR PBB SHADOW E&M-EST. PATIENT-LVL III: CPT | Mod: PBBFAC,,, | Performed by: FAMILY MEDICINE

## 2017-11-21 PROCEDURE — 99213 OFFICE O/P EST LOW 20 MIN: CPT | Mod: S$GLB,,, | Performed by: FAMILY MEDICINE

## 2017-11-21 NOTE — PROGRESS NOTES
Subjective:       Patient ID: Luke Borden is a 62 y.o. male.    Chief Complaint: Other (Patient here to discuss results from renal CT done 10/21 in which a renal cyst was noted. Patient would like to discuss results and treatment if warrented. )    Abnormal ct and concerned over cyst.      Review of Systems   Constitutional: Negative for chills and fever.   Respiratory: Negative for cough, chest tightness and shortness of breath.    Cardiovascular: Negative for chest pain, palpitations and leg swelling.   Endocrine: Negative for cold intolerance and heat intolerance.   Skin: Negative for rash.       Objective:      Physical Exam   Constitutional: He appears well-developed and well-nourished.   Cardiovascular: Normal rate, regular rhythm and normal heart sounds.    Pulmonary/Chest: Effort normal and breath sounds normal.   Abdominal: Soft. Bowel sounds are normal.   Musculoskeletal: Normal range of motion.   Nursing note and vitals reviewed.      Assessment:       1. Renal cyst    2. Eczema, unspecified type        Plan:       Renal cyst  -     Cancel: CT Abdomen Pelvis With Contrast; Future; Expected date: 11/21/2017    Eczema, unspecified type      eucerin prn eczema and monitor  Dedicated CT to image renal cyst found on other exam.  F/u with pcp.  Return if symptoms worsen or fail to improve.

## 2017-11-24 ENCOUNTER — HOSPITAL ENCOUNTER (OUTPATIENT)
Dept: RADIOLOGY | Facility: HOSPITAL | Age: 62
Discharge: HOME | End: 2017-11-24
Attending: FAMILY MEDICINE
Payer: COMMERCIAL

## 2017-11-24 DIAGNOSIS — N28.1 RENAL CYST: ICD-10-CM

## 2017-11-24 PROCEDURE — 74178 CT ABD&PLV WO CNTR FLWD CNTR: CPT | Mod: 26,,, | Performed by: RADIOLOGY

## 2017-11-24 PROCEDURE — 74178 CT ABD&PLV WO CNTR FLWD CNTR: CPT | Mod: TC,PO

## 2017-11-24 PROCEDURE — 25500020 PHARM REV CODE 255: Mod: PO | Performed by: FAMILY MEDICINE

## 2017-11-24 RX ADMIN — IOHEXOL 75 ML: 350 INJECTION, SOLUTION INTRAVENOUS at 01:11

## 2017-11-30 ENCOUNTER — TELEPHONE (OUTPATIENT)
Dept: FAMILY MEDICINE | Facility: CLINIC | Age: 62
End: 2017-11-30

## 2017-11-30 NOTE — TELEPHONE ENCOUNTER
----- Message from Radha Curran sent at 11/30/2017  8:39 AM CST -----  Contact: [phone]  Patient is calling for Ctscan test results Thanks!

## 2017-12-01 NOTE — TELEPHONE ENCOUNTER
----- Message from Koko Sharma sent at 12/1/2017  2:44 PM CST -----  Contact: patient  Patient returning call.please call back at [phone]. Thanks,

## 2017-12-18 ENCOUNTER — OFFICE VISIT (OUTPATIENT)
Dept: CARDIOLOGY | Facility: CLINIC | Age: 62
End: 2017-12-18
Payer: COMMERCIAL

## 2017-12-18 VITALS
SYSTOLIC BLOOD PRESSURE: 133 MMHG | HEART RATE: 68 BPM | BODY MASS INDEX: 24.75 KG/M2 | DIASTOLIC BLOOD PRESSURE: 89 MMHG | HEIGHT: 72 IN | WEIGHT: 182.75 LBS

## 2017-12-18 DIAGNOSIS — I42.8 CARDIOMYOPATHY, NONISCHEMIC: ICD-10-CM

## 2017-12-18 DIAGNOSIS — I48.0 PAF (PAROXYSMAL ATRIAL FIBRILLATION): Primary | ICD-10-CM

## 2017-12-18 DIAGNOSIS — G47.33 OBSTRUCTIVE SLEEP APNEA SYNDROME: ICD-10-CM

## 2017-12-18 DIAGNOSIS — I49.1 PAC (PREMATURE ATRIAL CONTRACTION): ICD-10-CM

## 2017-12-18 DIAGNOSIS — I47.19 ATRIAL TACHYCARDIA: ICD-10-CM

## 2017-12-18 DIAGNOSIS — I49.3 PVC (PREMATURE VENTRICULAR CONTRACTION): ICD-10-CM

## 2017-12-18 PROCEDURE — 93000 ELECTROCARDIOGRAM COMPLETE: CPT | Mod: S$GLB,,, | Performed by: INTERNAL MEDICINE

## 2017-12-18 PROCEDURE — 99999 PR PBB SHADOW E&M-EST. PATIENT-LVL III: CPT | Mod: PBBFAC,,, | Performed by: INTERNAL MEDICINE

## 2017-12-18 PROCEDURE — 99215 OFFICE O/P EST HI 40 MIN: CPT | Mod: S$GLB,,, | Performed by: INTERNAL MEDICINE

## 2017-12-18 NOTE — PROGRESS NOTES
"Subjective:    Patient ID:  Lkue Borden is a 62 y.o. male who presents for follow-up of Atrial Fibrillation (post RFA f/u 10-31/17 - pt feels good and no symptoms )      HPI 63 yo male with NICM, PAC's, PVC's, nonsustained atrial tachycardia, atrial fibrillation.  Primary Cardiologist is Dr. Coleman.  Retired, [employer] .  Presented to PCP for routine visit, noted to have irregular heart beat.  Work-up revealed ectopy and cardiomyopathy.  Echo 2/16/17 EF 30-35%  Holter 2/17/17 2004 PVC's, frequent PAC's and "frequent runs of AT/AF."  Placed on beta blocker and ace inhibitor.  Echo 4/6/17 EF 30% LVEDD 6.2 cm, ALEXIS 28, moderate TR.  48 holter 4/6/17 1605 PVC's, 2431 PAC's frequent 3-20 beat runs of atrial tachycardia.  LHC 4/24/17 normal coronary arteries.  Admitted for Sotalol loading, now on 80 mg BID.  We elected to optimize Sleep Apnea management and weight reduction, and reassess.  Continued to have frequent ectopy.  Holter 8/3/17 2065 PAC's with nearly incessant runs in nonsustained atrial tachycardia, 5492 PVC's  Echo 9/22/17 EF 35-40%  PVI (RFA) 10/31/17  Has noted improvement in terms of energy since his PVI.  Denies palpitations.  Has had busy couple of days with both his mother and father having GI bleeding.  ECG reveals nsr without ectopy.        Review of Systems   Constitution: Negative. Negative for weakness and malaise/fatigue.   Cardiovascular: Negative for chest pain, dyspnea on exertion, irregular heartbeat, leg swelling, near-syncope, orthopnea, palpitations, paroxysmal nocturnal dyspnea and syncope.   Respiratory: Negative for cough.    Neurological: Negative for dizziness and light-headedness.   All other systems reviewed and are negative.       Objective:    Physical Exam   Constitutional: He is oriented to person, place, and time. He appears well-developed and well-nourished.   Eyes: Conjunctivae are normal. No scleral icterus.   Neck: No JVD present. No tracheal deviation " present.   Cardiovascular: Normal rate, regular rhythm and normal heart sounds.  PMI is not displaced.    Pulmonary/Chest: Effort normal and breath sounds normal. No respiratory distress.   Abdominal: Soft. There is no hepatosplenomegaly. There is no tenderness.   Musculoskeletal: He exhibits no edema (lower extremity) or tenderness.   Neurological: He is alert and oriented to person, place, and time.   Skin: Skin is warm and dry. No rash noted.   Psychiatric: He has a normal mood and affect. His behavior is normal.         Assessment:       1. PAF (paroxysmal atrial fibrillation)    2. PAC (premature atrial contraction)    3. Atrial tachycardia    4. Obstructive sleep apnea syndrome    5. Cardiomyopathy, nonischemic         Plan:           Notes improvement since PVI.  No ectopy on ECG (this is a significant difference compared to prior to PVI)  Discontinue Sotalol in 4 weeks.  Holter monitor and echo in 6 weeks.  F/u with me in 2 months.

## 2017-12-19 DIAGNOSIS — I49.3 PVC (PREMATURE VENTRICULAR CONTRACTION): Primary | ICD-10-CM

## 2018-02-17 RX ORDER — SOTALOL HYDROCHLORIDE 80 MG/1
80 TABLET ORAL 2 TIMES DAILY
Qty: 60 TABLET | Refills: 11 | Status: SHIPPED | OUTPATIENT
Start: 2018-02-17 | End: 2018-03-12

## 2018-02-19 ENCOUNTER — TELEPHONE (OUTPATIENT)
Dept: FAMILY MEDICINE | Facility: CLINIC | Age: 63
End: 2018-02-19

## 2018-02-19 RX ORDER — TAMSULOSIN HYDROCHLORIDE 0.4 MG/1
0.4 CAPSULE ORAL DAILY
Qty: 30 CAPSULE | Refills: 5 | Status: SHIPPED | OUTPATIENT
Start: 2018-02-19 | End: 2018-09-10 | Stop reason: SDUPTHER

## 2018-02-19 NOTE — TELEPHONE ENCOUNTER
----- Message from Merlyn Corea sent at 2/19/2018  8:22 AM CST -----  Contact: self  Patient need a refill on tamsulosin please send to [facility], any questions please call back at [phone] (home)     [facility]/pharmacy #[id number] - [place] - [address]  [address]  [place] [address]  Phone: [phone] Fax: [phone]

## 2018-02-26 ENCOUNTER — CLINICAL SUPPORT (OUTPATIENT)
Dept: CARDIOLOGY | Facility: CLINIC | Age: 63
End: 2018-02-26
Attending: INTERNAL MEDICINE
Payer: COMMERCIAL

## 2018-02-26 DIAGNOSIS — I48.0 PAF (PAROXYSMAL ATRIAL FIBRILLATION): ICD-10-CM

## 2018-02-26 PROCEDURE — 93224 XTRNL ECG REC UP TO 48 HRS: CPT | Mod: S$GLB,,, | Performed by: INTERNAL MEDICINE

## 2018-02-26 PROCEDURE — 93306 TTE W/DOPPLER COMPLETE: CPT | Mod: S$GLB,,, | Performed by: INTERNAL MEDICINE

## 2018-02-27 LAB
AORTIC VALVE REGURGITATION: ABNORMAL
DIASTOLIC DYSFUNCTION: NO
ESTIMATED PA SYSTOLIC PRESSURE: 31.3
MITRAL VALVE REGURGITATION: ABNORMAL
RETIRED EF AND QEF - SEE NOTES: 40 (ref 55–65)
TRICUSPID VALVE REGURGITATION: ABNORMAL

## 2018-02-28 ENCOUNTER — TELEPHONE (OUTPATIENT)
Dept: ELECTROPHYSIOLOGY | Facility: CLINIC | Age: 63
End: 2018-02-28

## 2018-02-28 NOTE — TELEPHONE ENCOUNTER
Left message for patient to return call to review results of echo, per Dr Hamilton's note. Call back # left.

## 2018-02-28 NOTE — TELEPHONE ENCOUNTER
----- Message from Shawn Hamilton MD sent at 2/28/2018  9:24 AM CST -----  Echo reveals EF is improving, now 40-45%.  Continue current therapy, follow up as scheduled.

## 2018-03-01 ENCOUNTER — TELEPHONE (OUTPATIENT)
Dept: ELECTROPHYSIOLOGY | Facility: CLINIC | Age: 63
End: 2018-03-01

## 2018-03-01 NOTE — TELEPHONE ENCOUNTER
Spoke with patient and relayed results of echo and holter, per Dr Hamilton's note. He verbalizes understanding.

## 2018-03-01 NOTE — TELEPHONE ENCOUNTER
----- Message from Liz Lorenz MA sent at 3/1/2018  9:40 AM CST -----  Contact: Patient  Pt returned your call  ----- Message -----  From: Dia Cool  Sent: 2/28/2018   4:07 PM  To: Joy Escobar Staff    The Pt is returning your call. Please call him back @ [phone]. Thanks, Dia

## 2018-03-12 ENCOUNTER — OFFICE VISIT (OUTPATIENT)
Dept: CARDIOLOGY | Facility: CLINIC | Age: 63
End: 2018-03-12
Payer: COMMERCIAL

## 2018-03-12 ENCOUNTER — TELEPHONE (OUTPATIENT)
Dept: FAMILY MEDICINE | Facility: CLINIC | Age: 63
End: 2018-03-12

## 2018-03-12 VITALS
DIASTOLIC BLOOD PRESSURE: 76 MMHG | HEART RATE: 59 BPM | WEIGHT: 181.19 LBS | BODY MASS INDEX: 24.54 KG/M2 | SYSTOLIC BLOOD PRESSURE: 117 MMHG | HEIGHT: 72 IN

## 2018-03-12 DIAGNOSIS — I49.1 PAC (PREMATURE ATRIAL CONTRACTION): ICD-10-CM

## 2018-03-12 DIAGNOSIS — G47.33 OBSTRUCTIVE SLEEP APNEA SYNDROME: ICD-10-CM

## 2018-03-12 DIAGNOSIS — I48.0 PAF (PAROXYSMAL ATRIAL FIBRILLATION): Primary | ICD-10-CM

## 2018-03-12 DIAGNOSIS — I47.19 ATRIAL TACHYCARDIA: ICD-10-CM

## 2018-03-12 DIAGNOSIS — I42.8 CARDIOMYOPATHY, NONISCHEMIC: ICD-10-CM

## 2018-03-12 PROCEDURE — 99999 PR PBB SHADOW E&M-EST. PATIENT-LVL III: CPT | Mod: PBBFAC,,, | Performed by: INTERNAL MEDICINE

## 2018-03-12 PROCEDURE — 3074F SYST BP LT 130 MM HG: CPT | Mod: CPTII,S$GLB,, | Performed by: INTERNAL MEDICINE

## 2018-03-12 PROCEDURE — 93000 ELECTROCARDIOGRAM COMPLETE: CPT | Mod: S$GLB,,, | Performed by: INTERNAL MEDICINE

## 2018-03-12 PROCEDURE — 3078F DIAST BP <80 MM HG: CPT | Mod: CPTII,S$GLB,, | Performed by: INTERNAL MEDICINE

## 2018-03-12 PROCEDURE — 99215 OFFICE O/P EST HI 40 MIN: CPT | Mod: S$GLB,,, | Performed by: INTERNAL MEDICINE

## 2018-03-12 RX ORDER — METOPROLOL SUCCINATE 50 MG/1
50 TABLET, EXTENDED RELEASE ORAL DAILY
Qty: 90 TABLET | Refills: 3 | Status: SHIPPED | OUTPATIENT
Start: 2018-03-12 | End: 2019-04-02 | Stop reason: SDUPTHER

## 2018-03-12 NOTE — PROGRESS NOTES
"Subjective:    Patient ID:  Luke Borden is a 62 y.o. male who presents for follow-up of SVT (2 month f/u - review holter and echo ) and PAF      HPI  63 yo male with NICM, PAC's, PVC's, nonsustained atrial tachycardia, atrial fibrillation.  Primary Cardiologist is Dr. Coleman.  Retired, [employer] .  Presented to PCP for routine visit, noted to have irregular heart beat.  Work-up revealed ectopy and cardiomyopathy.  Echo 2/16/17 EF 30-35%  Holter 2/17/17 2004 PVC's, frequent PAC's and "frequent runs of AT/AF."  Placed on beta blocker and ace inhibitor.  Echo 4/6/17 EF 30% LVEDD 6.2 cm, ALEXIS 28, moderate TR.  48 holter 4/6/17 1605 PVC's, 2431 PAC's frequent 3-20 beat runs of atrial tachycardia.  LHC 4/24/17 normal coronary arteries.  Placed on Sotalol >> no improvement.  We elected to optimize Sleep Apnea management and weight reduction, and reassess.  Continued to have frequent ectopy.  Holter 8/3/17 2065 PAC's with nearly incessant runs in nonsustained atrial tachycardia, 5492 PVC's  Echo 9/22/17 EF 35-40%    PVI (RFA) 10/31/17.  Has noted symptomatic benefit since then.  Holter 2/26/18 NSR with 23 PVC's, 31 PAC's, no AT/AF  Echo 2/26/18 EF 40-45%.  Continues to feel well.    Review of Systems   Constitution: Negative. Negative for weakness and malaise/fatigue.   Cardiovascular: Negative for chest pain, dyspnea on exertion, irregular heartbeat, leg swelling, near-syncope, orthopnea, palpitations, paroxysmal nocturnal dyspnea and syncope.   Respiratory: Negative for cough and shortness of breath.    Neurological: Negative for dizziness and light-headedness.   All other systems reviewed and are negative.       Objective:    Physical Exam   Constitutional: He is oriented to person, place, and time. He appears well-developed and well-nourished.   Eyes: Conjunctivae are normal. No scleral icterus.   Neck: No JVD present. No tracheal deviation present.   Cardiovascular: Normal rate, regular rhythm and " normal heart sounds.  PMI is not displaced.    Pulmonary/Chest: Effort normal and breath sounds normal. No respiratory distress.   Abdominal: Soft. There is no hepatosplenomegaly. There is no tenderness.   Musculoskeletal: He exhibits no edema (lower extremity) or tenderness.   Neurological: He is alert and oriented to person, place, and time.   Skin: Skin is warm and dry. No rash noted.   Psychiatric: He has a normal mood and affect. His behavior is normal.         Assessment:       1. PAF (paroxysmal atrial fibrillation)    2. Cardiomyopathy, nonischemic    3. Atrial tachycardia    4. PAC (premature atrial contraction)    5. Obstructive sleep apnea syndrome         Plan:             Doing well.  EF has improved since PVI, although has not normalized.  Discontinue Sotalol.  Toprol 50 mg daily (25 mg daily for first week).  F/u with me in 6 months.  Re-address anticoagulation at that time.  Re-establish care with Dr. Coleman.

## 2018-03-12 NOTE — TELEPHONE ENCOUNTER
LOV 11/21/17.  Last refilled on 9/8/17.  90 tablets with 3 refills.   Records indicate that pt should have enough of this medication to last until 9/8/18. Patient advised to contact his pharmacy.  Verbalized understanding.

## 2018-03-12 NOTE — TELEPHONE ENCOUNTER
----- Message from Clement Queen sent at 3/12/2018 10:59 AM CDT -----  Contact: Luke  Calling to request a 90 day supply on Rx atorvastatin (LIPITOR) 20 MG tablet  He is close to being out    [facility] - [place], [place] - [address] [place] [address]  Phone: [phone] Fax: [phone]    Please call him when sent so he can verify with pharmacy. [phone]. thanks

## 2018-03-13 DIAGNOSIS — I47.19 ATRIAL TACHYCARDIA: Primary | ICD-10-CM

## 2018-05-04 ENCOUNTER — OFFICE VISIT (OUTPATIENT)
Dept: CARDIOLOGY | Facility: CLINIC | Age: 63
End: 2018-05-04
Payer: COMMERCIAL

## 2018-05-04 VITALS
WEIGHT: 179.44 LBS | BODY MASS INDEX: 24.3 KG/M2 | HEIGHT: 72 IN | SYSTOLIC BLOOD PRESSURE: 127 MMHG | HEART RATE: 62 BPM | DIASTOLIC BLOOD PRESSURE: 76 MMHG

## 2018-05-04 DIAGNOSIS — Z86.79 HISTORY OF CARDIOMYOPATHY: ICD-10-CM

## 2018-05-04 DIAGNOSIS — I49.3 PREMATURE VENTRICULAR CONTRACTIONS (PVCS) (VPCS): ICD-10-CM

## 2018-05-04 DIAGNOSIS — I47.19 ATRIAL TACHYCARDIA: Chronic | ICD-10-CM

## 2018-05-04 DIAGNOSIS — I47.10 SVT (SUPRAVENTRICULAR TACHYCARDIA): Primary | ICD-10-CM

## 2018-05-04 DIAGNOSIS — E78.00 HYPERCHOLESTEREMIA: ICD-10-CM

## 2018-05-04 DIAGNOSIS — I42.8 CARDIOMYOPATHY, NONISCHEMIC: ICD-10-CM

## 2018-05-04 DIAGNOSIS — G47.33 OBSTRUCTIVE SLEEP APNEA SYNDROME: ICD-10-CM

## 2018-05-04 DIAGNOSIS — I48.0 PAF (PAROXYSMAL ATRIAL FIBRILLATION): ICD-10-CM

## 2018-05-04 PROCEDURE — 99214 OFFICE O/P EST MOD 30 MIN: CPT | Mod: S$GLB,,, | Performed by: INTERNAL MEDICINE

## 2018-05-04 PROCEDURE — 3078F DIAST BP <80 MM HG: CPT | Mod: CPTII,S$GLB,, | Performed by: INTERNAL MEDICINE

## 2018-05-04 PROCEDURE — 3008F BODY MASS INDEX DOCD: CPT | Mod: CPTII,S$GLB,, | Performed by: INTERNAL MEDICINE

## 2018-05-04 PROCEDURE — 3074F SYST BP LT 130 MM HG: CPT | Mod: CPTII,S$GLB,, | Performed by: INTERNAL MEDICINE

## 2018-05-04 PROCEDURE — 99999 PR PBB SHADOW E&M-EST. PATIENT-LVL III: CPT | Mod: PBBFAC,,, | Performed by: INTERNAL MEDICINE

## 2018-05-04 NOTE — PROGRESS NOTES
Subjective:    Patient ID:  Luke Borden is a 62 y.o. male who presents for follow-up of No chief complaint on file.      HPI  Here for follow up of PAF-RFA 10/18/PAC/PVC. Patient denies palpitations, syncope, presyncope, lightheadedness or dizziness.  Patients states is doing well no chest pain, SOB or change in exertional tolerence. Patient is exercising regularly with out symptoms.    Review of Systems   Constitution: Negative for malaise/fatigue.   Eyes: Negative for blurred vision.   Cardiovascular: Negative for chest pain, claudication, cyanosis, dyspnea on exertion, irregular heartbeat, leg swelling, near-syncope, orthopnea, palpitations, paroxysmal nocturnal dyspnea and syncope.   Respiratory: Negative for cough and shortness of breath.    Hematologic/Lymphatic: Does not bruise/bleed easily.   Musculoskeletal: Negative for back pain, falls, joint pain, muscle cramps, muscle weakness and myalgias.   Gastrointestinal: Negative for abdominal pain, change in bowel habit, nausea and vomiting.   Genitourinary: Negative for urgency.   Neurological: Negative for dizziness, focal weakness and light-headedness.        Objective:    Physical Exam   Constitutional: He is oriented to person, place, and time. He appears well-developed and well-nourished.   Eyes: Conjunctivae are normal. No scleral icterus.   Neck: No JVD present. No tracheal deviation present.   Cardiovascular: Normal rate, regular rhythm and normal heart sounds.  PMI is not displaced.    Pulmonary/Chest: Effort normal and breath sounds normal. No respiratory distress.   Abdominal: Soft. There is no hepatosplenomegaly. There is no tenderness.   Musculoskeletal: He exhibits no edema (lower extremity) or tenderness.   Neurological: He is alert and oriented to person, place, and time.   Skin: Skin is warm and dry. No rash noted.   Psychiatric: He has a normal mood and affect. His behavior is normal.             ..    Chemistry        Component Value  Date/Time     11/01/2017 0410    K 4.0 11/01/2017 0410     11/01/2017 0410    CO2 24 11/01/2017 0410    BUN 12 11/01/2017 0410    CREATININE 0.8 11/01/2017 0410     11/01/2017 0410        Component Value Date/Time    CALCIUM 8.2 (L) 11/01/2017 0410    ALKPHOS 65 05/22/2017 1315    AST 34 05/22/2017 1315    ALT 36 05/22/2017 1315    BILITOT 0.7 05/22/2017 1315    ESTGFRAFRICA >60.0 11/01/2017 0410    EGFRNONAA >60.0 11/01/2017 0410            ..  Lab Results   Component Value Date    CHOL 169 02/10/2017    CHOL 182 02/06/2016    CHOL 219 (H) 11/03/2014     Lab Results   Component Value Date    HDL 67 02/10/2017    HDL 71 02/06/2016    HDL 81 (H) 11/03/2014     Lab Results   Component Value Date    LDLCALC 85.4 02/10/2017    LDLCALC 94.6 02/06/2016    LDLCALC 106.8 11/03/2014     Lab Results   Component Value Date    TRIG 83 02/10/2017    TRIG 82 02/06/2016    TRIG 156 (H) 11/03/2014     Lab Results   Component Value Date    CHOLHDL 39.6 02/10/2017    CHOLHDL 39.0 02/06/2016    CHOLHDL 37.0 11/03/2014     ..  Lab Results   Component Value Date    WBC 11.47 11/01/2017    HGB 11.2 (L) 11/01/2017    HCT 33.1 (L) 11/01/2017    MCV 97 11/01/2017     11/01/2017       Test(s) Reviewed  I have reviewed the following in detail:  [] Stress test   [] Angiography   [x] Echocardiogram   [x] Labs   [x] Other:       Assessment:         ICD-10-CM ICD-9-CM   1. SVT (supraventricular tachycardia) I47.1 427.89   2. PAF (paroxysmal atrial fibrillation) I48.0 427.31   3. History of cardiomyopathy Z86.79 V12.59   4. Obstructive sleep apnea syndrome G47.33 327.23   5. Atrial tachycardia I47.1 427.89   6. Hypercholesteremia E78.00 272.0   7. Cardiomyopathy, nonischemic I42.8 425.4   8. Premature ventricular contractions (PVCs) (VPCs) I49.3 427.69     Problem List Items Addressed This Visit     Atrial tachycardia (Chronic)    Cardiomyopathy, nonischemic    History of cardiomyopathy    Overview     2/2017 EF 30 %          Hypercholesteremia    Obstructive sleep apnea syndrome    PAF (paroxysmal atrial fibrillation)    Premature ventricular contractions (PVCs) (VPCs)    SVT (supraventricular tachycardia) - Primary           Plan:           Return to clinic 4 months   Low level/low impact aerobic exercise 5x's/wk. Heart healthy diet and risk factor modification.    See labs and med orders.  Advised patient to purchase Kardia key to monitor rhythm/PAF at home using cell phone.     Continue NOAC x3 months (9 months post RFA) if no sx's and neg kardia then dc NOAC use ASA understands risk of CVA

## 2018-08-02 RX ORDER — RAMIPRIL 1.25 MG/1
1.25 CAPSULE ORAL NIGHTLY
Qty: 90 CAPSULE | Refills: 4 | Status: SHIPPED | OUTPATIENT
Start: 2018-08-02 | End: 2018-11-01

## 2018-08-02 RX ORDER — RAMIPRIL 1.25 MG/1
1.25 CAPSULE ORAL NIGHTLY
Qty: 90 CAPSULE | Refills: 4 | Status: SHIPPED | OUTPATIENT
Start: 2018-08-02 | End: 2018-11-01 | Stop reason: SDUPTHER

## 2018-08-02 NOTE — TELEPHONE ENCOUNTER
----- Message from Roselyn Pillai sent at 8/2/2018  8:11 AM CDT -----  Contact: self  Type:  RX Refill Request    Who Called:  Patient   Refill or New Rx:  refill  RX Name and Strength:ramipril (ALTACE) 1.25 MG capsule  How is the patient currently taking it? (ex. 1XDay): Take 1 capsule (1.25 mg total) by mouth every evening. - Oral  Is this a 30 day or 90 day RX:  90    Preferred Pharmacy with phone number:   [id number] [place] [facility] [address]  [address]  [place] [address]  Phone: [phone] Fax: [phone]      Local or Mail Order:  Local  Ordering Provider:  marilu Eugene Call Back Number: [phone]    Additional Information:  Patient states he is almost out of medication. Patient will like a call back.

## 2018-08-02 NOTE — TELEPHONE ENCOUNTER
----- Message from Albert Rangel sent at 2018  3:25 PM CDT -----  Contact: PT  Pt is almost out of his Rx of ramipril (ALTACE) 1.25 MG capsule () and wanted to follow up to ensure the message was received by the office. Please call pt to inform him when the Rx is sent to the pharmacy.  Pt only has 3 tablet left and is taking it 1 x's per day    Call Back #: [phone]   Thanks    [id number] - [place] - [address]  [address]  [place] [address]  Phone: [phone] Fax: [phone]

## 2018-08-14 RX ORDER — TAMSULOSIN HYDROCHLORIDE 0.4 MG/1
0.4 CAPSULE ORAL DAILY
Qty: 30 CAPSULE | Refills: 5 | OUTPATIENT
Start: 2018-08-14

## 2018-08-16 NOTE — TELEPHONE ENCOUNTER
Spoke to pt and notified him that he needs to be seen first and pt verbalized understanding. Offered pt sooner appt than first available with Dr. Ruiz but pt declined. Scheduled pt appt for first available.

## 2018-08-29 ENCOUNTER — TELEPHONE (OUTPATIENT)
Dept: CARDIOLOGY | Facility: CLINIC | Age: 63
End: 2018-08-29

## 2018-08-30 ENCOUNTER — TELEPHONE (OUTPATIENT)
Dept: CARDIOLOGY | Facility: CLINIC | Age: 63
End: 2018-08-30

## 2018-08-30 NOTE — TELEPHONE ENCOUNTER
Spoke to pt. Reschedule Dr. Coleman appointment and labs. Pt. Verbally accepted new appointments.

## 2018-08-30 NOTE — TELEPHONE ENCOUNTER
----- Message from Ryanne Tellez sent at 8/30/2018  8:30 AM CDT -----  Contact: self                         attn:  Demond  Patient [phone] is returning call to nurse Demond from yesterday 08 29 18/please call

## 2018-09-07 ENCOUNTER — OFFICE VISIT (OUTPATIENT)
Dept: CARDIOLOGY | Facility: CLINIC | Age: 63
End: 2018-09-07
Payer: COMMERCIAL

## 2018-09-07 VITALS
BODY MASS INDEX: 24.18 KG/M2 | DIASTOLIC BLOOD PRESSURE: 69 MMHG | HEART RATE: 79 BPM | SYSTOLIC BLOOD PRESSURE: 116 MMHG | HEIGHT: 72 IN | WEIGHT: 178.56 LBS

## 2018-09-07 DIAGNOSIS — I47.10 SVT (SUPRAVENTRICULAR TACHYCARDIA): ICD-10-CM

## 2018-09-07 DIAGNOSIS — I47.19 ATRIAL TACHYCARDIA: Chronic | ICD-10-CM

## 2018-09-07 DIAGNOSIS — I42.8 CARDIOMYOPATHY, NONISCHEMIC: ICD-10-CM

## 2018-09-07 DIAGNOSIS — I48.0 PAF (PAROXYSMAL ATRIAL FIBRILLATION): Primary | ICD-10-CM

## 2018-09-07 DIAGNOSIS — G47.33 OBSTRUCTIVE SLEEP APNEA SYNDROME: ICD-10-CM

## 2018-09-07 DIAGNOSIS — I49.3 PREMATURE VENTRICULAR CONTRACTIONS (PVCS) (VPCS): ICD-10-CM

## 2018-09-07 PROCEDURE — 3008F BODY MASS INDEX DOCD: CPT | Mod: CPTII,S$GLB,, | Performed by: INTERNAL MEDICINE

## 2018-09-07 PROCEDURE — 99999 PR PBB SHADOW E&M-EST. PATIENT-LVL III: CPT | Mod: PBBFAC,,, | Performed by: INTERNAL MEDICINE

## 2018-09-07 PROCEDURE — 3078F DIAST BP <80 MM HG: CPT | Mod: CPTII,S$GLB,, | Performed by: INTERNAL MEDICINE

## 2018-09-07 PROCEDURE — 93000 ELECTROCARDIOGRAM COMPLETE: CPT | Mod: S$GLB,,, | Performed by: INTERNAL MEDICINE

## 2018-09-07 PROCEDURE — 99214 OFFICE O/P EST MOD 30 MIN: CPT | Mod: S$GLB,,, | Performed by: INTERNAL MEDICINE

## 2018-09-07 PROCEDURE — 3074F SYST BP LT 130 MM HG: CPT | Mod: CPTII,S$GLB,, | Performed by: INTERNAL MEDICINE

## 2018-09-07 NOTE — PROGRESS NOTES
"Subjective:    Patient ID:  Luke Borden is a 62 y.o. male who presents for follow-up of Follow-up      HPI 63 yo male with NICM, PAC's, PVC's, nonsustained atrial tachycardia, atrial fibrillation.  Primary Cardiologist is Dr. Coleman.  Retired, [employer] .  Presented to PCP for routine visit, noted to have irregular heart beat.  Work-up revealed ectopy and cardiomyopathy.  Echo 2/16/17 EF 30-35%  Holter 2/17/17 2004 PVC's, frequent PAC's and "frequent runs of AT/AF."  Placed on beta blocker and ace inhibitor.  Echo 4/6/17 EF 30% LVEDD 6.2 cm, ALEXIS 28, moderate TR.  48 holter 4/6/17 1605 PVC's, 2431 PAC's frequent 3-20 beat runs of atrial tachycardia.  LHC 4/24/17 normal coronary arteries.  Placed on Sotalol >> no improvement.  We elected to optimize Sleep Apnea management and weight reduction, and reassess.  Continued to have frequent ectopy.  Holter 8/3/17 2065 PAC's with nearly incessant runs in nonsustained atrial tachycardia, 5492 PVC's  Echo 9/22/17 EF 35-40%     PVI (RFA) 10/31/17.  Has noted symptomatic benefit since then.  Holter 2/26/18 NSR with 23 PVC's, 31 PAC's, no AT/AF  Echo 2/26/18 EF 40-45%.    Last visit we discontinued Sotalol and initiated Toprol.  Feeling well.  Had an episode of presyncope standing up at night to go to the bathroom.  Denies dyspnea, palpitations.  ECG reveals nsr without ectopy.          ROS     Objective:    Physical Exam   Constitutional: He is oriented to person, place, and time. He appears well-developed and well-nourished.   Eyes: Conjunctivae are normal. No scleral icterus.   Neck: No JVD present. No tracheal deviation present.   Cardiovascular: Normal rate, regular rhythm and normal heart sounds. PMI is not displaced.   Pulmonary/Chest: Effort normal and breath sounds normal. No respiratory distress.   Abdominal: Soft. There is no hepatosplenomegaly. There is no tenderness.   Musculoskeletal: He exhibits no edema (lower extremity) or tenderness. "   Neurological: He is alert and oriented to person, place, and time.   Skin: Skin is warm and dry. No rash noted.   Psychiatric: He has a normal mood and affect. His behavior is normal.         Assessment:       1. PAF (paroxysmal atrial fibrillation)    2. Atrial tachycardia    3. Cardiomyopathy, nonischemic    4. Premature ventricular contractions (PVCs) (VPCs)    5. Obstructive sleep apnea syndrome         Plan:       Continues to do well from an arrhythmia standpoint.  Echo and 24 hr holter monitor.  F/u in one year.

## 2018-09-10 DIAGNOSIS — N40.0 BENIGN PROSTATIC HYPERPLASIA, UNSPECIFIED WHETHER LOWER URINARY TRACT SYMPTOMS PRESENT: Primary | ICD-10-CM

## 2018-09-10 NOTE — TELEPHONE ENCOUNTER
----- Message from Skylar Zuñiga sent at 9/10/2018  9:52 AM CDT -----  Type:  RX Refill Request    Who Called:  Patient  Refill or New Rx:  refill  RX Name and Strength:  losin (FLOMAX) 0.4 mg Cp24   How is the patient currently taking it? (ex. 1XDay):  1xday  Is this a 30 day or 90 day RX:  30  Preferred Pharmacy with phone number:    [id number] [place] - [address]  [address]  [place] [address]  Phone: [phone] Fax: [phone]  Local or Mail Order:  local  Ordering Provider:  same  Best Call Back Number:  [phone]  Additional Information:  Patient has an appointment on 9/20/18 but is out of his medication and would like office to send him enough to bridge the gap.

## 2018-09-11 RX ORDER — TAMSULOSIN HYDROCHLORIDE 0.4 MG/1
0.4 CAPSULE ORAL DAILY
Qty: 90 CAPSULE | Refills: 0 | Status: SHIPPED | OUTPATIENT
Start: 2018-09-11 | End: 2018-12-10 | Stop reason: SDUPTHER

## 2018-09-11 RX ORDER — TAMSULOSIN HYDROCHLORIDE 0.4 MG/1
0.4 CAPSULE ORAL DAILY
Qty: 30 CAPSULE | Refills: 5 | OUTPATIENT
Start: 2018-09-11

## 2018-09-12 DIAGNOSIS — I47.10 SVT (SUPRAVENTRICULAR TACHYCARDIA): Primary | ICD-10-CM

## 2018-09-19 ENCOUNTER — TELEPHONE (OUTPATIENT)
Dept: ELECTROPHYSIOLOGY | Facility: CLINIC | Age: 63
End: 2018-09-19

## 2018-09-19 NOTE — TELEPHONE ENCOUNTER
----- Message from Liz Lorenz MA sent at 9/19/2018  3:32 PM CDT -----  Contact: KIMBERLY DOE [[id number]]      ----- Message -----  From: Naila Callejas  Sent: 9/19/2018   3:14 PM  To: Joy Escobar Staff        Name of Who is Calling: KIMBERLY DOE [[id number]]      What is the request in detail:   Patient called requesting a call as this pertains to what was discuss at his last office visit. Please give a call back at your earliest convenience.     THANKS!      Can the clinic reply by [facility]:  No      What Number to Call Back: KIMBERLY DOE / ph# [phone]

## 2018-09-20 ENCOUNTER — OFFICE VISIT (OUTPATIENT)
Dept: FAMILY MEDICINE | Facility: CLINIC | Age: 63
End: 2018-09-20
Payer: COMMERCIAL

## 2018-09-20 VITALS
HEART RATE: 69 BPM | SYSTOLIC BLOOD PRESSURE: 110 MMHG | BODY MASS INDEX: 24.18 KG/M2 | HEIGHT: 72 IN | DIASTOLIC BLOOD PRESSURE: 70 MMHG | RESPIRATION RATE: 18 BRPM | WEIGHT: 178.56 LBS | OXYGEN SATURATION: 96 %

## 2018-09-20 DIAGNOSIS — I10 ESSENTIAL HYPERTENSION: ICD-10-CM

## 2018-09-20 DIAGNOSIS — Z00.00 WELLNESS EXAMINATION: Primary | ICD-10-CM

## 2018-09-20 DIAGNOSIS — E78.00 HYPERCHOLESTEREMIA: ICD-10-CM

## 2018-09-20 PROCEDURE — 3074F SYST BP LT 130 MM HG: CPT | Mod: CPTII,S$GLB,, | Performed by: FAMILY MEDICINE

## 2018-09-20 PROCEDURE — 3078F DIAST BP <80 MM HG: CPT | Mod: CPTII,S$GLB,, | Performed by: FAMILY MEDICINE

## 2018-09-20 PROCEDURE — 99999 PR PBB SHADOW E&M-EST. PATIENT-LVL III: CPT | Mod: PBBFAC,,, | Performed by: FAMILY MEDICINE

## 2018-09-20 PROCEDURE — 99396 PREV VISIT EST AGE 40-64: CPT | Mod: S$GLB,,, | Performed by: FAMILY MEDICINE

## 2018-09-20 NOTE — PROGRESS NOTES
Subjective:       Patient ID: Luke Borden is a 62 y.o. male.    Chief Complaint: Annual Exam (medication refill)    Here for wellness. Due for labs. Doing well overall.      Hypertension   This is a chronic problem. The current episode started more than 1 year ago. The problem is controlled. Pertinent negatives include no chest pain, headaches, neck pain, palpitations or shortness of breath.   Hyperlipidemia   This is a chronic problem. The current episode started more than 1 year ago. The problem is controlled. Pertinent negatives include no chest pain or shortness of breath.     Review of Systems   Constitutional: Negative for activity change, chills, fever and unexpected weight change.   HENT: Negative for hearing loss, rhinorrhea and trouble swallowing.    Eyes: Negative for discharge and visual disturbance.   Respiratory: Negative for cough, chest tightness and shortness of breath.    Cardiovascular: Negative for chest pain, palpitations and leg swelling.   Gastrointestinal: Negative for blood in stool, constipation, diarrhea and vomiting.   Endocrine: Negative for cold intolerance, heat intolerance, polydipsia and polyuria.   Genitourinary: Negative for difficulty urinating, hematuria and urgency.   Musculoskeletal: Negative for arthralgias, joint swelling and neck pain.   Skin: Negative for rash.   Neurological: Negative for weakness and headaches.   Psychiatric/Behavioral: Negative for confusion and dysphoric mood.       Objective:      Physical Exam   Constitutional: He appears well-developed and well-nourished.   HENT:   Head: Normocephalic and atraumatic.   Cardiovascular: Normal rate, regular rhythm and normal heart sounds.   Pulmonary/Chest: Effort normal and breath sounds normal.   Psychiatric: He has a normal mood and affect.   Nursing note and vitals reviewed.      Assessment:       1. Wellness examination    2. Hypercholesteremia    3. Essential hypertension        Plan:       Wellness  examination    Hypercholesteremia    Essential hypertension      Update psa and labs already ordered by cardiology  Will monitor chronic medical issues and continue current plan of care.      Follow-up in about 6 months (around 3/20/2019), or if symptoms worsen or fail to improve.

## 2018-10-01 NOTE — TELEPHONE ENCOUNTER
----- Message from Soledad Alejandra sent at 10/1/2018  8:40 AM CDT -----  Contact: Patient  Type:  RX Refill Request    Who Called:  Patient  Refill or New Rx:  Refill (the medication is )  RX Name and Strength:  atorvastatin (LIPITOR) 20 MG tablet  How is the patient currently taking it? (ex. 1XDay):  Take 1 tablet (20 mg total) by mouth once daily. - Oral  Is this a 30 day or 90 day RX:  90 tablets  Preferred Pharmacy with phone number:    [facility]  [address] [address]  Phone: [phone] Fax: [phone]  Local or Mail Order: Mail  Ordering Provider:  Dr Austin Eugene Call Back Number:  [phone]  Additional Information:  Calling to request a refill. Please advise.

## 2018-10-05 RX ORDER — ATORVASTATIN CALCIUM 20 MG/1
20 TABLET, FILM COATED ORAL DAILY
Qty: 90 TABLET | Refills: 3 | Status: SHIPPED | OUTPATIENT
Start: 2018-10-05 | End: 2020-03-25

## 2018-10-08 ENCOUNTER — CLINICAL SUPPORT (OUTPATIENT)
Dept: ELECTROPHYSIOLOGY | Facility: CLINIC | Age: 63
End: 2018-10-08
Attending: INTERNAL MEDICINE
Payer: COMMERCIAL

## 2018-10-08 ENCOUNTER — HOSPITAL ENCOUNTER (OUTPATIENT)
Dept: CARDIOLOGY | Facility: CLINIC | Age: 63
Discharge: HOME | End: 2018-10-08
Attending: INTERNAL MEDICINE
Payer: COMMERCIAL

## 2018-10-08 ENCOUNTER — TELEPHONE (OUTPATIENT)
Dept: ELECTROPHYSIOLOGY | Facility: CLINIC | Age: 63
End: 2018-10-08

## 2018-10-08 DIAGNOSIS — I48.0 PAF (PAROXYSMAL ATRIAL FIBRILLATION): ICD-10-CM

## 2018-10-12 ENCOUNTER — TELEPHONE (OUTPATIENT)
Dept: CARDIOLOGY | Facility: CLINIC | Age: 63
End: 2018-10-12

## 2018-10-12 DIAGNOSIS — I48.0 PAF (PAROXYSMAL ATRIAL FIBRILLATION): Primary | ICD-10-CM

## 2018-10-12 NOTE — TELEPHONE ENCOUNTER
----- Message from Riena Kirby sent at 10/12/2018 11:24 AM CDT -----  Contact: [phone]  Patient is requesting a call back from the nurse concerning test.    Please call the patient upon request at phone number [phone].

## 2018-10-25 ENCOUNTER — LAB VISIT (OUTPATIENT)
Dept: LAB | Facility: HOSPITAL | Age: 63
End: 2018-10-25
Attending: INTERNAL MEDICINE
Payer: COMMERCIAL

## 2018-10-25 DIAGNOSIS — I42.8 CARDIOMYOPATHY, NONISCHEMIC: ICD-10-CM

## 2018-10-25 DIAGNOSIS — G47.33 OBSTRUCTIVE SLEEP APNEA SYNDROME: ICD-10-CM

## 2018-10-25 DIAGNOSIS — I47.19 ATRIAL TACHYCARDIA: Chronic | ICD-10-CM

## 2018-10-25 DIAGNOSIS — E78.00 HYPERCHOLESTEREMIA: ICD-10-CM

## 2018-10-25 DIAGNOSIS — I48.0 PAF (PAROXYSMAL ATRIAL FIBRILLATION): ICD-10-CM

## 2018-10-25 DIAGNOSIS — Z86.79 HISTORY OF CARDIOMYOPATHY: ICD-10-CM

## 2018-10-25 DIAGNOSIS — I47.10 SVT (SUPRAVENTRICULAR TACHYCARDIA): ICD-10-CM

## 2018-10-25 LAB
ALBUMIN SERPL BCP-MCNC: 3.6 G/DL
ALP SERPL-CCNC: 84 U/L
ALT SERPL W/O P-5'-P-CCNC: 21 U/L
ANION GAP SERPL CALC-SCNC: 6 MMOL/L
AST SERPL-CCNC: 28 U/L
BASOPHILS # BLD AUTO: 0.07 K/UL
BASOPHILS NFR BLD: 1.2 %
BILIRUB SERPL-MCNC: 0.6 MG/DL
BUN SERPL-MCNC: 16 MG/DL
CALCIUM SERPL-MCNC: 9.1 MG/DL
CHLORIDE SERPL-SCNC: 107 MMOL/L
CHOLEST SERPL-MCNC: 149 MG/DL
CHOLEST/HDLC SERPL: 2.3 {RATIO}
CO2 SERPL-SCNC: 28 MMOL/L
CREAT SERPL-MCNC: 0.9 MG/DL
DIFFERENTIAL METHOD: ABNORMAL
EOSINOPHIL # BLD AUTO: 0.2 K/UL
EOSINOPHIL NFR BLD: 3.5 %
ERYTHROCYTE [DISTWIDTH] IN BLOOD BY AUTOMATED COUNT: 13.4 %
EST. GFR  (AFRICAN AMERICAN): >60 ML/MIN/1.73 M^2
EST. GFR  (NON AFRICAN AMERICAN): >60 ML/MIN/1.73 M^2
GLUCOSE SERPL-MCNC: 95 MG/DL
HCT VFR BLD AUTO: 39 %
HDLC SERPL-MCNC: 66 MG/DL
HDLC SERPL: 44.3 %
HGB BLD-MCNC: 12.4 G/DL
IMM GRANULOCYTES # BLD AUTO: 0.02 K/UL
IMM GRANULOCYTES NFR BLD AUTO: 0.3 %
LDLC SERPL CALC-MCNC: 75 MG/DL
LYMPHOCYTES # BLD AUTO: 1 K/UL
LYMPHOCYTES NFR BLD: 16.2 %
MCH RBC QN AUTO: 32 PG
MCHC RBC AUTO-ENTMCNC: 31.8 G/DL
MCV RBC AUTO: 101 FL
MONOCYTES # BLD AUTO: 0.7 K/UL
MONOCYTES NFR BLD: 11.7 %
NEUTROPHILS # BLD AUTO: 4 K/UL
NEUTROPHILS NFR BLD: 67.1 %
NONHDLC SERPL-MCNC: 83 MG/DL
NRBC BLD-RTO: 0 /100 WBC
PLATELET # BLD AUTO: 214 K/UL
PMV BLD AUTO: 11.4 FL
POTASSIUM SERPL-SCNC: 5 MMOL/L
PROT SERPL-MCNC: 6.7 G/DL
RBC # BLD AUTO: 3.87 M/UL
SODIUM SERPL-SCNC: 141 MMOL/L
T4 FREE SERPL-MCNC: 0.87 NG/DL
TRIGL SERPL-MCNC: 40 MG/DL
TSH SERPL DL<=0.005 MIU/L-ACNC: 0.85 UIU/ML
WBC # BLD AUTO: 6 K/UL

## 2018-10-25 PROCEDURE — 36415 COLL VENOUS BLD VENIPUNCTURE: CPT | Mod: PO

## 2018-10-25 PROCEDURE — 85025 COMPLETE CBC W/AUTO DIFF WBC: CPT

## 2018-10-25 PROCEDURE — 84439 ASSAY OF FREE THYROXINE: CPT

## 2018-10-25 PROCEDURE — 80053 COMPREHEN METABOLIC PANEL: CPT

## 2018-10-25 PROCEDURE — 84443 ASSAY THYROID STIM HORMONE: CPT

## 2018-10-25 PROCEDURE — 80061 LIPID PANEL: CPT

## 2018-10-29 ENCOUNTER — CLINICAL SUPPORT (OUTPATIENT)
Dept: CARDIOLOGY | Facility: CLINIC | Age: 63
End: 2018-10-29
Attending: INTERNAL MEDICINE
Payer: COMMERCIAL

## 2018-10-29 ENCOUNTER — TELEPHONE (OUTPATIENT)
Dept: CARDIOLOGY | Facility: CLINIC | Age: 63
End: 2018-10-29

## 2018-10-29 ENCOUNTER — TELEPHONE (OUTPATIENT)
Dept: ELECTROPHYSIOLOGY | Facility: CLINIC | Age: 63
End: 2018-10-29

## 2018-10-29 VITALS
BODY MASS INDEX: 24.11 KG/M2 | SYSTOLIC BLOOD PRESSURE: 130 MMHG | WEIGHT: 178 LBS | DIASTOLIC BLOOD PRESSURE: 78 MMHG | HEIGHT: 72 IN | HEART RATE: 68 BPM

## 2018-10-29 DIAGNOSIS — I48.0 PAF (PAROXYSMAL ATRIAL FIBRILLATION): ICD-10-CM

## 2018-10-29 PROCEDURE — 99999 PR PBB SHADOW E&M-EST. PATIENT-LVL II: CPT | Mod: PBBFAC,,,

## 2018-10-29 PROCEDURE — 93306 TTE W/DOPPLER COMPLETE: CPT | Mod: S$GLB,,, | Performed by: INTERNAL MEDICINE

## 2018-10-29 PROCEDURE — 93224 XTRNL ECG REC UP TO 48 HRS: CPT | Mod: S$GLB,,, | Performed by: INTERNAL MEDICINE

## 2018-10-29 NOTE — TELEPHONE ENCOUNTER
Spoke to pt. Stated he wasn't aware of holter was a 48 hour holter and wanted to reschedule appointment with Dr. Coleman. Informed pt. Dr. Coleman next available isn't until January 23rd. Pt. Stated he will just keep his appointment..

## 2018-10-29 NOTE — TELEPHONE ENCOUNTER
----- Message from Florencio Carter sent at 10/29/2018  3:02 PM CDT -----  Type: Needs Medical Advice    Who Called:  Patient    Best Call Back Number: [phone]  Additional Information: Patient calling.  States that he would like a call back regarding his appointment on 11/01

## 2018-10-30 ENCOUNTER — TELEPHONE (OUTPATIENT)
Dept: FAMILY MEDICINE | Facility: CLINIC | Age: 63
End: 2018-10-30

## 2018-10-30 LAB
ASCENDING AORTA: 2.76 CM
AV MEAN GRADIENT: 3.2 MMHG
AV PEAK GRADIENT: 6.61 MMHG
AV VALVE AREA: 2.44 CM2
BSA FOR ECHO PROCEDURE: 2.03 M2
CV ECHO LV RWT: 0.32 CM
DOP CALC AO PEAK VEL: 1.29 M/S
DOP CALC AO VTI: 25.55 CM
DOP CALC LVOT AREA: 3.17 CM2
DOP CALC LVOT DIAMETER: 2.01 CM
DOP CALC LVOT STROKE VOLUME: 62.22 CM3
DOP CALCLVOT PEAK VEL VTI: 19.62 CM
E WAVE DECELERATION TIME: 345.98 MSEC
E/A RATIO: 1.28
E/E' RATIO: 5.1
ECHO LV POSTERIOR WALL: 0.82 CM (ref 0.6–1.1)
FRACTIONAL SHORTENING: 37 % (ref 28–44)
INTERVENTRICULAR SEPTUM: 0.87 CM (ref 0.6–1.1)
IVRT: 0.08 MSEC
LA MAJOR: 5.72 CM
LA MINOR: 5.47 CM
LA WIDTH: 4.94 CM
LEFT ATRIUM SIZE: 3.09 CM
LEFT ATRIUM VOLUME INDEX: 35.7 ML/M2
LEFT ATRIUM VOLUME: 72.56 CM3
LEFT INTERNAL DIMENSION IN SYSTOLE: 3.28 CM (ref 2.1–4)
LEFT VENTRICLE DIASTOLIC VOLUME INDEX: 64.3 ML/M2
LEFT VENTRICLE DIASTOLIC VOLUME: 130.53 ML
LEFT VENTRICLE MASS INDEX: 77.2 G/M2
LEFT VENTRICLE SYSTOLIC VOLUME INDEX: 21.4 ML/M2
LEFT VENTRICLE SYSTOLIC VOLUME: 43.42 ML
LEFT VENTRICULAR INTERNAL DIMENSION IN DIASTOLE: 5.22 CM (ref 3.5–6)
LEFT VENTRICULAR MASS: 156.77 G
LV LATERAL E/E' RATIO: 5.1
LV SEPTAL E/E' RATIO: 5.1
MV PEAK A VEL: 0.4 M/S
MV PEAK E VEL: 0.51 M/S
MV STENOSIS PRESSURE HALF TIME: 100.34 MS
MV VALVE AREA P 1/2 METHOD: 2.19 CM2
PISA TR MAX VEL: 2.62 M/S
PULM VEIN S/D RATIO: 1
PV PEAK D VEL: 0.46 M/S
PV PEAK S VEL: 0.46 M/S
RA MAJOR: 6.41 CM
RA PRESSURE: 3 MMHG
RA WIDTH: 3.22 CM
RETIRED EF AND QEF - SEE NOTES: 66.74 %
RIGHT VENTRICULAR END-DIASTOLIC DIMENSION: 4 CM
SINUS: 3.33 CM
STJ: 2.69 CM
TDI LATERAL: 0.1
TDI SEPTAL: 0.1
TDI: 0.1
TR MAX PG: 27.46 MMHG
TRICUSPID ANNULAR PLANE SYSTOLIC EXCURSION: 2.99 CM
TV REST PULMONARY ARTERY PRESSURE: 30.46 MMHG

## 2018-10-30 NOTE — TELEPHONE ENCOUNTER
----- Message from Kameron Michaud sent at 10/30/2018  9:40 AM CDT -----  Contact: Patient  Type: Needs Medical Advice    Who Called:  Patient  Best Call Back Number: [phone]  Additional Information: Patient has questions regarding lab work done 10/25/18

## 2018-11-01 ENCOUNTER — TELEPHONE (OUTPATIENT)
Dept: FAMILY MEDICINE | Facility: CLINIC | Age: 63
End: 2018-11-01

## 2018-11-01 ENCOUNTER — OFFICE VISIT (OUTPATIENT)
Dept: CARDIOLOGY | Facility: CLINIC | Age: 63
End: 2018-11-01
Payer: COMMERCIAL

## 2018-11-01 VITALS
BODY MASS INDEX: 23.92 KG/M2 | DIASTOLIC BLOOD PRESSURE: 71 MMHG | WEIGHT: 176.56 LBS | HEIGHT: 72 IN | HEART RATE: 70 BPM | SYSTOLIC BLOOD PRESSURE: 125 MMHG

## 2018-11-01 DIAGNOSIS — I47.10 SVT (SUPRAVENTRICULAR TACHYCARDIA): ICD-10-CM

## 2018-11-01 DIAGNOSIS — E78.00 HYPERCHOLESTEREMIA: ICD-10-CM

## 2018-11-01 DIAGNOSIS — I49.3 PREMATURE VENTRICULAR CONTRACTIONS (PVCS) (VPCS): ICD-10-CM

## 2018-11-01 DIAGNOSIS — Z86.79 HISTORY OF CARDIOMYOPATHY: Primary | ICD-10-CM

## 2018-11-01 DIAGNOSIS — G47.33 OBSTRUCTIVE SLEEP APNEA SYNDROME: ICD-10-CM

## 2018-11-01 DIAGNOSIS — I48.0 PAF (PAROXYSMAL ATRIAL FIBRILLATION): ICD-10-CM

## 2018-11-01 PROCEDURE — 3074F SYST BP LT 130 MM HG: CPT | Mod: CPTII,S$GLB,, | Performed by: INTERNAL MEDICINE

## 2018-11-01 PROCEDURE — 99999 PR PBB SHADOW E&M-EST. PATIENT-LVL III: CPT | Mod: PBBFAC,,, | Performed by: INTERNAL MEDICINE

## 2018-11-01 PROCEDURE — 3078F DIAST BP <80 MM HG: CPT | Mod: CPTII,S$GLB,, | Performed by: INTERNAL MEDICINE

## 2018-11-01 PROCEDURE — 99214 OFFICE O/P EST MOD 30 MIN: CPT | Mod: S$GLB,,, | Performed by: INTERNAL MEDICINE

## 2018-11-01 PROCEDURE — 3008F BODY MASS INDEX DOCD: CPT | Mod: CPTII,S$GLB,, | Performed by: INTERNAL MEDICINE

## 2018-11-01 NOTE — TELEPHONE ENCOUNTER
----- Message from Skylar Zuñiga sent at 11/1/2018  9:50 AM CDT -----  Type:  Test Results    Who Called:  Patient  Name of Test (Lab/Mammo/Etc):  Lab  Date of Test:  10/25/18  Ordering Provider:  same  Where the test was performed:  [facility]  Best Call Back Number:  [phone]

## 2018-11-01 NOTE — TELEPHONE ENCOUNTER
Spoke with the patient he has received  his lab results from Dr Coleman and he would like to talk with Dr Sara castañeda

## 2018-11-01 NOTE — PROGRESS NOTES
Subjective:    Patient ID:  Luke Borden is a 62 y.o. male who presents for follow-up of No chief complaint on file.      HPI  Here for follow up of PAF-RFA 10/18/PAC/PVC. Patient denies palpitations, syncope, presyncope, lightheadedness or dizziness.  Patients states is doing well no chest pain, SOB or change in exertional tolerence.         Review of Systems   Constitution: Negative for malaise/fatigue.   Eyes: Negative for blurred vision.   Cardiovascular: Negative for chest pain, claudication, cyanosis, dyspnea on exertion, irregular heartbeat, leg swelling, near-syncope, orthopnea, palpitations, paroxysmal nocturnal dyspnea and syncope.   Respiratory: Negative for cough and shortness of breath.    Hematologic/Lymphatic: Does not bruise/bleed easily.   Musculoskeletal: Negative for back pain, falls, joint pain, muscle cramps, muscle weakness and myalgias.   Gastrointestinal: Negative for abdominal pain, change in bowel habit, nausea and vomiting.   Genitourinary: Negative for urgency.   Neurological: Negative for dizziness, focal weakness and light-headedness.        Objective:    Physical Exam   Constitutional: He is oriented to person, place, and time. He appears well-developed and well-nourished.   Eyes: Conjunctivae are normal. No scleral icterus.   Neck: No JVD present. No tracheal deviation present.   Cardiovascular: Normal rate, regular rhythm and normal heart sounds. PMI is not displaced.   Pulmonary/Chest: Effort normal and breath sounds normal. No respiratory distress.   Abdominal: Soft. There is no hepatosplenomegaly. There is no tenderness.   Musculoskeletal: He exhibits no edema (lower extremity) or tenderness.   Neurological: He is alert and oriented to person, place, and time.   Skin: Skin is warm and dry. No rash noted.   Psychiatric: He has a normal mood and affect. His behavior is normal.               ..    Chemistry        Component Value Date/Time     10/25/2018 0819    K 5.0  10/25/2018 0819     10/25/2018 0819    CO2 28 10/25/2018 0819    BUN 16 10/25/2018 0819    CREATININE 0.9 10/25/2018 0819    GLU 95 10/25/2018 0819        Component Value Date/Time    CALCIUM 9.1 10/25/2018 0819    ALKPHOS 84 10/25/2018 0819    AST 28 10/25/2018 0819    ALT 21 10/25/2018 0819    BILITOT 0.6 10/25/2018 0819    ESTGFRAFRICA >60.0 10/25/2018 0819    EGFRNONAA >60.0 10/25/2018 0819            ..  Lab Results   Component Value Date    CHOL 149 10/25/2018    CHOL 169 02/10/2017    CHOL 182 02/06/2016     Lab Results   Component Value Date    HDL 66 10/25/2018    HDL 67 02/10/2017    HDL 71 02/06/2016     Lab Results   Component Value Date    LDLCALC 75.0 10/25/2018    LDLCALC 85.4 02/10/2017    LDLCALC 94.6 02/06/2016     Lab Results   Component Value Date    TRIG 40 10/25/2018    TRIG 83 02/10/2017    TRIG 82 02/06/2016     Lab Results   Component Value Date    CHOLHDL 44.3 10/25/2018    CHOLHDL 39.6 02/10/2017    CHOLHDL 39.0 02/06/2016     ..  Lab Results   Component Value Date    WBC 6.00 10/25/2018    HGB 12.4 (L) 10/25/2018    HCT 39.0 (L) 10/25/2018     (H) 10/25/2018     10/25/2018       Test(s) Reviewed  I have reviewed the following in detail:  [] Stress test   [] Angiography   [x] Echocardiogram   [x] Labs   [] Other:       Assessment:         ICD-10-CM ICD-9-CM   1. History of cardiomyopathy Z86.79 V12.59   2. SVT (supraventricular tachycardia) I47.1 427.89   3. PAF (paroxysmal atrial fibrillation) I48.0 427.31   4. Premature ventricular contractions (PVCs) (VPCs) I49.3 427.69   5. Obstructive sleep apnea syndrome G47.33 327.23     Problem List Items Addressed This Visit     History of cardiomyopathy - Primary    Overview     2/2017 EF 30 %         Obstructive sleep apnea syndrome    PAF (paroxysmal atrial fibrillation)    Premature ventricular contractions (PVCs) (VPCs)    SVT (supraventricular tachycardia)           Plan:           Return to clinic 6 months   Low  level/low impact aerobic exercise 5x's/wk. Heart healthy diet and risk factor modification.    See labs and med orders.  CM resolved. LV back to nl size.   Advised patient to purchase portable ECG/EKG Monitor by [employer] to monitor rhythm.  If recent holter neg for AF ans is home monitor then dc NOAC use [employer] only  Continue CPAP  Dc ACEi due to hypotension and now nl EF

## 2018-11-01 NOTE — TELEPHONE ENCOUNTER
Patient states he was to have a PSA at his last lab draw added by Dr Ruiz, PSA was not added. Do you think he needs one this year.

## 2018-11-02 DIAGNOSIS — Z12.5 SCREENING FOR PROSTATE CANCER: Primary | ICD-10-CM

## 2018-11-05 ENCOUNTER — LAB VISIT (OUTPATIENT)
Dept: LAB | Facility: HOSPITAL | Age: 63
End: 2018-11-05
Attending: FAMILY MEDICINE
Payer: COMMERCIAL

## 2018-11-05 DIAGNOSIS — Z12.5 SCREENING FOR PROSTATE CANCER: ICD-10-CM

## 2018-11-05 LAB — COMPLEXED PSA SERPL-MCNC: 0.76 NG/ML

## 2018-11-05 PROCEDURE — 84153 ASSAY OF PSA TOTAL: CPT

## 2018-11-05 PROCEDURE — 36415 COLL VENOUS BLD VENIPUNCTURE: CPT | Mod: PO

## 2018-11-06 LAB
OHS CV HOLTER LENGTH DECIMAL HOURS: 48
OHS CV HOLTER LENGTH HOURS: 48
OHS CV HOLTER LENGTH MINUTES: 0

## 2018-11-12 ENCOUNTER — TELEPHONE (OUTPATIENT)
Dept: CARDIOLOGY | Facility: CLINIC | Age: 63
End: 2018-11-12

## 2018-11-12 NOTE — TELEPHONE ENCOUNTER
----- Message from Yun Andrade sent at 11/12/2018  9:42 AM CST -----  Type: Needs Medical Advice    Who Called:  self  Symptoms (please be specific):  48 hour heart monitor results  How long has patient had these symptoms:  10/29  Pharmacy name and phone #: [facility]     Best Call Back Number: [phone] (home)     Additional Information:

## 2018-12-10 DIAGNOSIS — N40.0 BENIGN PROSTATIC HYPERPLASIA, UNSPECIFIED WHETHER LOWER URINARY TRACT SYMPTOMS PRESENT: ICD-10-CM

## 2018-12-13 RX ORDER — TAMSULOSIN HYDROCHLORIDE 0.4 MG/1
CAPSULE ORAL
Qty: 90 CAPSULE | Refills: 0 | Status: SHIPPED | OUTPATIENT
Start: 2018-12-13 | End: 2019-03-05 | Stop reason: SDUPTHER

## 2018-12-13 NOTE — PROGRESS NOTES
Refill Authorization Note     is requesting a refill authorization.    Brief assessment and rationale for refill: APPROVE; prr  Amount/Quantity of medication ordered: 90d  Date of last appointment: 9/20/2018     Refills Authorized: Yes  If authorized number of refills: 0           Medication Therapy Plan: BPH-nco; Nov- 03/21/19; BP-controlled; Approve 3 more months   Name and strength of medication: TAMSULOSIN HCL 0.4 MG CAPSULE  How patient will take medication: t1c po qd  Medication reconciliation completed: No        Comments:   BP Readings from Last 3 Encounters:   11/01/18 125/71   10/29/18 130/78   09/20/18 110/70

## 2019-01-03 ENCOUNTER — OFFICE VISIT (OUTPATIENT)
Dept: FAMILY MEDICINE | Facility: CLINIC | Age: 64
End: 2019-01-03
Payer: COMMERCIAL

## 2019-01-03 VITALS
HEART RATE: 73 BPM | DIASTOLIC BLOOD PRESSURE: 72 MMHG | SYSTOLIC BLOOD PRESSURE: 124 MMHG | BODY MASS INDEX: 25.02 KG/M2 | HEIGHT: 72 IN | WEIGHT: 184.75 LBS | OXYGEN SATURATION: 98 %

## 2019-01-03 DIAGNOSIS — J06.9 UPPER RESPIRATORY TRACT INFECTION, UNSPECIFIED TYPE: Primary | ICD-10-CM

## 2019-01-03 DIAGNOSIS — H61.20 IMPACTED CERUMEN, UNSPECIFIED LATERALITY: ICD-10-CM

## 2019-01-03 PROCEDURE — 3008F BODY MASS INDEX DOCD: CPT | Mod: CPTII,S$GLB,, | Performed by: FAMILY MEDICINE

## 2019-01-03 PROCEDURE — 99213 PR OFFICE/OUTPT VISIT, EST, LEVL III, 20-29 MIN: ICD-10-PCS | Mod: 25,S$GLB,, | Performed by: FAMILY MEDICINE

## 2019-01-03 PROCEDURE — 69209 REMOVE IMPACTED EAR WAX UNI: CPT | Mod: RT,S$GLB,, | Performed by: FAMILY MEDICINE

## 2019-01-03 PROCEDURE — 99213 OFFICE O/P EST LOW 20 MIN: CPT | Mod: 25,S$GLB,, | Performed by: FAMILY MEDICINE

## 2019-01-03 PROCEDURE — 3074F PR MOST RECENT SYSTOLIC BLOOD PRESSURE < 130 MM HG: ICD-10-PCS | Mod: CPTII,S$GLB,, | Performed by: FAMILY MEDICINE

## 2019-01-03 PROCEDURE — 3078F PR MOST RECENT DIASTOLIC BLOOD PRESSURE < 80 MM HG: ICD-10-PCS | Mod: CPTII,S$GLB,, | Performed by: FAMILY MEDICINE

## 2019-01-03 PROCEDURE — 3288F FALL RISK ASSESSMENT DOCD: CPT | Mod: CPTII,S$GLB,, | Performed by: FAMILY MEDICINE

## 2019-01-03 PROCEDURE — 69209 PR REMOVAL IMPACTED CERUMEN USING IRRIGATION/LAVAGE, UNILATERAL: ICD-10-PCS | Mod: RT,S$GLB,, | Performed by: FAMILY MEDICINE

## 2019-01-03 PROCEDURE — 1100F PR PT FALLS ASSESS DOC 2+ FALLS/FALL W/INJURY/YR: ICD-10-PCS | Mod: CPTII,S$GLB,, | Performed by: FAMILY MEDICINE

## 2019-01-03 PROCEDURE — 1100F PTFALLS ASSESS-DOCD GE2>/YR: CPT | Mod: CPTII,S$GLB,, | Performed by: FAMILY MEDICINE

## 2019-01-03 PROCEDURE — 3078F DIAST BP <80 MM HG: CPT | Mod: CPTII,S$GLB,, | Performed by: FAMILY MEDICINE

## 2019-01-03 PROCEDURE — 3074F SYST BP LT 130 MM HG: CPT | Mod: CPTII,S$GLB,, | Performed by: FAMILY MEDICINE

## 2019-01-03 PROCEDURE — 99999 PR PBB SHADOW E&M-EST. PATIENT-LVL III: CPT | Mod: PBBFAC,,, | Performed by: FAMILY MEDICINE

## 2019-01-03 PROCEDURE — 3288F PR FALLS RISK ASSESSMENT DOCUMENTED: ICD-10-PCS | Mod: CPTII,S$GLB,, | Performed by: FAMILY MEDICINE

## 2019-01-03 PROCEDURE — 3008F PR BODY MASS INDEX (BMI) DOCUMENTED: ICD-10-PCS | Mod: CPTII,S$GLB,, | Performed by: FAMILY MEDICINE

## 2019-01-03 PROCEDURE — 99999 PR PBB SHADOW E&M-EST. PATIENT-LVL III: ICD-10-PCS | Mod: PBBFAC,,, | Performed by: FAMILY MEDICINE

## 2019-01-03 NOTE — PROGRESS NOTES
C/o 3 week h/o sinus congestion, cough, nasal congestion.  He feels as though he is improving and is better today.    Also c/o blocked sensation in both ears.

## 2019-01-05 NOTE — PROGRESS NOTES
Subjective:       Patient ID: Luke Borden is a 63 y.o. male.    Chief Complaint: Nasal Congestion; Cough; and Cerumen Impaction    C/o 3 week h/o sinus congestion, cough, nasal congestion.  He feels as though he is improving and is better today.    Also c/o blocked sensation in both ears.        Past Medical History:   Diagnosis Date    Anticoagulant long-term use     Asthma     Cardiomyopathy     Decreased cardiac ejection fraction     (30% 4/6/17)    Hypercholesteremia     JEANINE on CPAP     PAC (premature atrial contraction)        Past Surgical History:   Procedure Laterality Date    ABLATION N/A 10/31/2017    Performed by Shawn [facility] at [facility] CATH LAB    CARDIAC CATHETERIZATION  04/2017    angiogram/[facility], [place]    HEART CATH-LEFT N/A 4/24/2017    Performed by Rusty Coleman MD at [facility] CATH    TONSILLECTOMY      TRANSESOPHAGEAL ECHOCARDIOGRAM (CASSANDRA) N/A 10/30/2017    Performed by Demar Canseco MD at [facility] JACOB    VASECTOMY         Review of patient's allergies indicates:  No Known Allergies    Social History     Socioeconomic History    Marital status:      Spouse name: Not on file    Number of children: Not on file    Years of education: Not on file    Highest education level: Not on file   Social Needs    Financial resource strain: Not on file    Food insecurity - worry: Not on file    Food insecurity - inability: Not on file    Transportation needs - medical: Not on file    Transportation needs - non-medical: Not on file   Occupational History    Not on file   Tobacco Use    Smoking status: Never Smoker    Smokeless tobacco: Never Used   Substance and Sexual Activity    Alcohol use: Yes     Comment: rarely    Drug use: No    Sexual activity: Yes   Other Topics Concern    Not on file   Social History Narrative    Not on file       Current Outpatient Medications on File Prior to Visit   Medication Sig Dispense Refill    apixaban (ELIQUIS) 5 mg Tab Take 1 tablet  (5 mg total) by mouth 2 (two) times daily. 90 tablet 3    atorvastatin (LIPITOR) 20 MG tablet Take 1 tablet (20 mg total) by mouth once daily. 90 tablet 3    cholecalciferol, vitamin D3, (VITAMIN D3) 2,000 unit Cap Take 1 capsule by mouth once daily.      tamsulosin (FLOMAX) 0.4 mg Cap TAKE 1 CAPSULE BY MOUTH EVERY DAY 90 capsule 0    metoprolol succinate (TOPROL-XL) 50 MG 24 hr tablet Take 1 tablet (50 mg total) by mouth once daily. 90 tablet 3     No current facility-administered medications on file prior to visit.        Family History   Problem Relation Age of Onset    Hypertension Mother     Cancer Mother         breast    Heart disease Father     Hypertension Father     Cancer Father         melanoma behind right ear    Asthma Maternal Uncle        Review of Systems   Constitutional: Negative for appetite change, chills, fever and unexpected weight change.   HENT: Positive for congestion, hearing loss, postnasal drip and rhinorrhea. Negative for sore throat and trouble swallowing.    Eyes: Negative for pain and visual disturbance.   Respiratory: Positive for cough. Negative for chest tightness, shortness of breath and wheezing.    Cardiovascular: Negative for chest pain, palpitations and leg swelling.   Gastrointestinal: Negative for abdominal pain, blood in stool, constipation, diarrhea and nausea.   Genitourinary: Negative for difficulty urinating, dysuria and hematuria.   Musculoskeletal: Negative for arthralgias, gait problem and neck pain.   Skin: Negative for rash and wound.   Neurological: Negative for dizziness, weakness, light-headedness and headaches.   Hematological: Negative for adenopathy.   Psychiatric/Behavioral: Negative for dysphoric mood.       Objective:      /72 (BP Location: Left arm, Patient Position: Sitting, BP Method: Medium (Manual))   Pulse 73   Ht 6' (1.829 m)   Wt 83.8 kg (184 lb 11.9 oz)   SpO2 98%   BMI 25.06 kg/m²   Physical Exam   Constitutional: He appears  well-developed and well-nourished.   HENT:   Head: Normocephalic and atraumatic.   Right Ear: Tympanic membrane normal.   Left Ear: Tympanic membrane normal.   Nose: No mucosal edema or rhinorrhea. Right sinus exhibits no maxillary sinus tenderness and no frontal sinus tenderness. Left sinus exhibits no maxillary sinus tenderness and no frontal sinus tenderness.   Mouth/Throat: Mucous membranes are normal. No oropharyngeal exudate, posterior oropharyngeal edema or posterior oropharyngeal erythema.   Bilateral cerumen impactions   Eyes: Conjunctivae and EOM are normal. Pupils are equal, round, and reactive to light. Right eye exhibits no discharge. Left eye exhibits no discharge. No scleral icterus.   Neck: Normal range of motion. Neck supple. No thyromegaly present.   Cardiovascular: Normal rate, regular rhythm, normal heart sounds and intact distal pulses.   No murmur heard.  Pulmonary/Chest: Effort normal and breath sounds normal. No respiratory distress. He has no wheezes. He has no rales.   Lymphadenopathy:     He has no cervical adenopathy.       Assessment:       1. Upper respiratory tract infection, unspecified type    2. Impacted cerumen, unspecified laterality        Plan:       Upper respiratory tract infection, unspecified type    Impacted cerumen, unspecified laterality      reassurance regarding resolved URI  Completely removed cerumen from left ear with curette  Removed 90% of cerumen from right ear with irrigation  Discussed home ear wax softening drops and flushing of right ear to remove remaining cerumen  F/u PRN

## 2019-02-28 ENCOUNTER — OFFICE VISIT (OUTPATIENT)
Dept: CARDIOLOGY | Facility: CLINIC | Age: 64
End: 2019-02-28
Payer: COMMERCIAL

## 2019-02-28 VITALS
WEIGHT: 181 LBS | BODY MASS INDEX: 24.52 KG/M2 | DIASTOLIC BLOOD PRESSURE: 77 MMHG | HEART RATE: 59 BPM | SYSTOLIC BLOOD PRESSURE: 120 MMHG | HEIGHT: 72 IN

## 2019-02-28 DIAGNOSIS — E78.00 HYPERCHOLESTEREMIA: ICD-10-CM

## 2019-02-28 DIAGNOSIS — I47.10 SVT (SUPRAVENTRICULAR TACHYCARDIA): ICD-10-CM

## 2019-02-28 DIAGNOSIS — I47.19 ATRIAL TACHYCARDIA: Chronic | ICD-10-CM

## 2019-02-28 DIAGNOSIS — I49.3 PREMATURE VENTRICULAR CONTRACTIONS (PVCS) (VPCS): ICD-10-CM

## 2019-02-28 DIAGNOSIS — Z86.79 HISTORY OF CARDIOMYOPATHY: Primary | ICD-10-CM

## 2019-02-28 DIAGNOSIS — I48.0 PAF (PAROXYSMAL ATRIAL FIBRILLATION): ICD-10-CM

## 2019-02-28 PROCEDURE — 1101F PR PT FALLS ASSESS DOC 0-1 FALLS W/OUT INJ PAST YR: ICD-10-PCS | Mod: CPTII,S$GLB,, | Performed by: INTERNAL MEDICINE

## 2019-02-28 PROCEDURE — 3008F PR BODY MASS INDEX (BMI) DOCUMENTED: ICD-10-PCS | Mod: CPTII,S$GLB,, | Performed by: INTERNAL MEDICINE

## 2019-02-28 PROCEDURE — 99213 OFFICE O/P EST LOW 20 MIN: CPT | Mod: S$GLB,,, | Performed by: INTERNAL MEDICINE

## 2019-02-28 PROCEDURE — 3008F BODY MASS INDEX DOCD: CPT | Mod: CPTII,S$GLB,, | Performed by: INTERNAL MEDICINE

## 2019-02-28 PROCEDURE — 3074F PR MOST RECENT SYSTOLIC BLOOD PRESSURE < 130 MM HG: ICD-10-PCS | Mod: CPTII,S$GLB,, | Performed by: INTERNAL MEDICINE

## 2019-02-28 PROCEDURE — 3078F DIAST BP <80 MM HG: CPT | Mod: CPTII,S$GLB,, | Performed by: INTERNAL MEDICINE

## 2019-02-28 PROCEDURE — 99999 PR PBB SHADOW E&M-EST. PATIENT-LVL III: ICD-10-PCS | Mod: PBBFAC,,, | Performed by: INTERNAL MEDICINE

## 2019-02-28 PROCEDURE — 3074F SYST BP LT 130 MM HG: CPT | Mod: CPTII,S$GLB,, | Performed by: INTERNAL MEDICINE

## 2019-02-28 PROCEDURE — 1101F PT FALLS ASSESS-DOCD LE1/YR: CPT | Mod: CPTII,S$GLB,, | Performed by: INTERNAL MEDICINE

## 2019-02-28 PROCEDURE — 99213 PR OFFICE/OUTPT VISIT, EST, LEVL III, 20-29 MIN: ICD-10-PCS | Mod: S$GLB,,, | Performed by: INTERNAL MEDICINE

## 2019-02-28 PROCEDURE — 3078F PR MOST RECENT DIASTOLIC BLOOD PRESSURE < 80 MM HG: ICD-10-PCS | Mod: CPTII,S$GLB,, | Performed by: INTERNAL MEDICINE

## 2019-02-28 PROCEDURE — 99999 PR PBB SHADOW E&M-EST. PATIENT-LVL III: CPT | Mod: PBBFAC,,, | Performed by: INTERNAL MEDICINE

## 2019-02-28 NOTE — PROGRESS NOTES
Subjective:    Patient ID:  Luke Borden is a 63 y.o. male who presents for follow-up of Atrial Tachycardia F/u      HPI  Here for follow up of PAF-RFA 10/18/PAC/PVC.Patients states is doing well no chest pain, SOB or change in exertional tolerence. Patient is exercising regularly with out symptoms.Patient denies palpitations, syncope, presyncope, lightheadedness or dizziness.    Review of Systems   Constitution: Negative for malaise/fatigue.   Eyes: Negative for blurred vision.   Cardiovascular: Negative for chest pain, claudication, cyanosis, dyspnea on exertion, irregular heartbeat, leg swelling, near-syncope, orthopnea, palpitations, paroxysmal nocturnal dyspnea and syncope.   Respiratory: Negative for cough and shortness of breath.    Hematologic/Lymphatic: Does not bruise/bleed easily.   Musculoskeletal: Negative for back pain, falls, joint pain, muscle cramps, muscle weakness and myalgias.   Gastrointestinal: Negative for abdominal pain, change in bowel habit, nausea and vomiting.   Genitourinary: Negative for urgency.   Neurological: Negative for dizziness, focal weakness and light-headedness.        Objective:    Physical Exam   Constitutional: He is oriented to person, place, and time. He appears well-developed and well-nourished.   Eyes: Conjunctivae are normal. No scleral icterus.   Neck: No JVD present. No tracheal deviation present.   Cardiovascular: Normal rate, regular rhythm and normal heart sounds. PMI is not displaced.   Pulmonary/Chest: Effort normal and breath sounds normal. No respiratory distress.   Abdominal: Soft. There is no hepatosplenomegaly. There is no tenderness.   Musculoskeletal: He exhibits no edema (lower extremity) or tenderness.   Neurological: He is alert and oriented to person, place, and time.   Skin: Skin is warm and dry. No rash noted.   Psychiatric: He has a normal mood and affect. His behavior is normal.               ..    Chemistry        Component Value Date/Time      10/25/2018 0819    K 5.0 10/25/2018 0819     10/25/2018 0819    CO2 28 10/25/2018 0819    BUN 16 10/25/2018 0819    CREATININE 0.9 10/25/2018 0819    GLU 95 10/25/2018 0819        Component Value Date/Time    CALCIUM 9.1 10/25/2018 0819    ALKPHOS 84 10/25/2018 0819    AST 28 10/25/2018 0819    ALT 21 10/25/2018 0819    BILITOT 0.6 10/25/2018 0819    ESTGFRAFRICA >60.0 10/25/2018 0819    EGFRNONAA >60.0 10/25/2018 0819            ..  Lab Results   Component Value Date    CHOL 149 10/25/2018    CHOL 169 02/10/2017    CHOL 182 02/06/2016     Lab Results   Component Value Date    HDL 66 10/25/2018    HDL 67 02/10/2017    HDL 71 02/06/2016     Lab Results   Component Value Date    LDLCALC 75.0 10/25/2018    LDLCALC 85.4 02/10/2017    LDLCALC 94.6 02/06/2016     Lab Results   Component Value Date    TRIG 40 10/25/2018    TRIG 83 02/10/2017    TRIG 82 02/06/2016     Lab Results   Component Value Date    CHOLHDL 44.3 10/25/2018    CHOLHDL 39.6 02/10/2017    CHOLHDL 39.0 02/06/2016     ..  Lab Results   Component Value Date    WBC 6.00 10/25/2018    HGB 12.4 (L) 10/25/2018    HCT 39.0 (L) 10/25/2018     (H) 10/25/2018     10/25/2018       Test(s) Reviewed  I have reviewed the following in detail:  [] Stress test   [] Angiography   [] Echocardiogram   [x] Labs   [x] Other:       Assessment:         ICD-10-CM ICD-9-CM   1. History of cardiomyopathy Z86.79 V12.59   2. SVT (supraventricular tachycardia) I47.1 427.89   3. Premature ventricular contractions (PVCs) (VPCs) I49.3 427.69   4. PAF (paroxysmal atrial fibrillation) I48.0 427.31   5. Hypercholesteremia E78.00 272.0   6. Atrial tachycardia I47.1 427.89     Problem List Items Addressed This Visit     SVT (supraventricular tachycardia)    Premature ventricular contractions (PVCs) (VPCs)    PAF (paroxysmal atrial fibrillation)    Hypercholesteremia    History of cardiomyopathy - Primary    Overview     10/2018 EF 60%  2/2017 EF 30 %          Atrial tachycardia (Chronic)           Plan:           Return to clinic 12 months   Low level/low impact aerobic exercise 5x's/wk. Heart healthy diet and risk factor modification.    See labs and med orders.  Labs next visit    Portions of this note may have been created with voice recognition software.  Grammatical, syntax and spelling errors may be inevitable.

## 2019-03-05 DIAGNOSIS — N40.0 BENIGN PROSTATIC HYPERPLASIA, UNSPECIFIED WHETHER LOWER URINARY TRACT SYMPTOMS PRESENT: ICD-10-CM

## 2019-03-06 RX ORDER — TAMSULOSIN HYDROCHLORIDE 0.4 MG/1
CAPSULE ORAL
Qty: 90 CAPSULE | Refills: 0 | Status: SHIPPED | OUTPATIENT
Start: 2019-03-06 | End: 2019-05-28 | Stop reason: SDUPTHER

## 2019-03-21 ENCOUNTER — TELEPHONE (OUTPATIENT)
Dept: FAMILY MEDICINE | Facility: CLINIC | Age: 64
End: 2019-03-21

## 2019-03-21 ENCOUNTER — OFFICE VISIT (OUTPATIENT)
Dept: FAMILY MEDICINE | Facility: CLINIC | Age: 64
End: 2019-03-21
Payer: COMMERCIAL

## 2019-03-21 VITALS
BODY MASS INDEX: 25.08 KG/M2 | DIASTOLIC BLOOD PRESSURE: 80 MMHG | WEIGHT: 185.19 LBS | TEMPERATURE: 98 F | HEIGHT: 72 IN | OXYGEN SATURATION: 98 % | SYSTOLIC BLOOD PRESSURE: 114 MMHG | HEART RATE: 61 BPM

## 2019-03-21 DIAGNOSIS — H61.21 IMPACTED CERUMEN OF RIGHT EAR: ICD-10-CM

## 2019-03-21 DIAGNOSIS — E78.00 HYPERCHOLESTEREMIA: Primary | ICD-10-CM

## 2019-03-21 DIAGNOSIS — Z00.00 WELLNESS EXAMINATION: Primary | ICD-10-CM

## 2019-03-21 PROCEDURE — 99999 PR PBB SHADOW E&M-EST. PATIENT-LVL III: CPT | Mod: PBBFAC,,, | Performed by: FAMILY MEDICINE

## 2019-03-21 PROCEDURE — 1101F PR PT FALLS ASSESS DOC 0-1 FALLS W/OUT INJ PAST YR: ICD-10-PCS | Mod: CPTII,S$GLB,, | Performed by: FAMILY MEDICINE

## 2019-03-21 PROCEDURE — 3079F DIAST BP 80-89 MM HG: CPT | Mod: CPTII,S$GLB,, | Performed by: FAMILY MEDICINE

## 2019-03-21 PROCEDURE — 3074F SYST BP LT 130 MM HG: CPT | Mod: CPTII,S$GLB,, | Performed by: FAMILY MEDICINE

## 2019-03-21 PROCEDURE — 99999 PR PBB SHADOW E&M-EST. PATIENT-LVL III: ICD-10-PCS | Mod: PBBFAC,,, | Performed by: FAMILY MEDICINE

## 2019-03-21 PROCEDURE — 3008F BODY MASS INDEX DOCD: CPT | Mod: CPTII,S$GLB,, | Performed by: FAMILY MEDICINE

## 2019-03-21 PROCEDURE — 99214 OFFICE O/P EST MOD 30 MIN: CPT | Mod: S$GLB,,, | Performed by: FAMILY MEDICINE

## 2019-03-21 PROCEDURE — 1101F PT FALLS ASSESS-DOCD LE1/YR: CPT | Mod: CPTII,S$GLB,, | Performed by: FAMILY MEDICINE

## 2019-03-21 PROCEDURE — 3008F PR BODY MASS INDEX (BMI) DOCUMENTED: ICD-10-PCS | Mod: CPTII,S$GLB,, | Performed by: FAMILY MEDICINE

## 2019-03-21 PROCEDURE — 99214 PR OFFICE/OUTPT VISIT, EST, LEVL IV, 30-39 MIN: ICD-10-PCS | Mod: S$GLB,,, | Performed by: FAMILY MEDICINE

## 2019-03-21 PROCEDURE — 3074F PR MOST RECENT SYSTOLIC BLOOD PRESSURE < 130 MM HG: ICD-10-PCS | Mod: CPTII,S$GLB,, | Performed by: FAMILY MEDICINE

## 2019-03-21 PROCEDURE — 3079F PR MOST RECENT DIASTOLIC BLOOD PRESSURE 80-89 MM HG: ICD-10-PCS | Mod: CPTII,S$GLB,, | Performed by: FAMILY MEDICINE

## 2019-03-21 NOTE — TELEPHONE ENCOUNTER
----- Message from Florencio Carter sent at 3/21/2019  9:29 AM CDT -----  Type: Needs Medical Advice    Who Called: Patient      Best Call Back Number: [phone]  Additional Information:  Patient states that he would like a callback regarding his appointment at 10:20 on 03/21

## 2019-03-21 NOTE — PROGRESS NOTES
Subjective:       Patient ID: Luke Borden is a 63 y.o. male.    Chief Complaint: Hyperlipidemia (6 month follow up)    Here for f/u lipids and chronic medical issues. Doing well overall. No complaints other than AR symptoms.      Hyperlipidemia   This is a chronic problem. The current episode started more than 1 year ago. The problem is controlled. Pertinent negatives include no chest pain or shortness of breath.     Review of Systems   Constitutional: Negative for activity change, chills, fever and unexpected weight change.   HENT: Negative for hearing loss, rhinorrhea and trouble swallowing.    Eyes: Negative for discharge and visual disturbance.   Respiratory: Negative for cough, chest tightness, shortness of breath and wheezing.    Cardiovascular: Negative for chest pain, palpitations and leg swelling.   Gastrointestinal: Negative for blood in stool, constipation, diarrhea and vomiting.   Endocrine: Negative for cold intolerance, heat intolerance, polydipsia and polyuria.   Genitourinary: Negative for difficulty urinating, hematuria and urgency.   Musculoskeletal: Negative for arthralgias, joint swelling and neck pain.   Skin: Negative for rash.   Neurological: Negative for weakness and headaches.   Psychiatric/Behavioral: Negative for confusion and dysphoric mood.       Objective:      Physical Exam   Constitutional: He appears well-developed and well-nourished.   HENT:   Head: Normocephalic and atraumatic.   Ears:    Cardiovascular: Normal rate, regular rhythm and normal heart sounds.   Pulmonary/Chest: Effort normal and breath sounds normal.   Psychiatric: He has a normal mood and affect.   Nursing note and vitals reviewed.      Assessment:       1. Hypercholesteremia    2. Impacted cerumen of right ear        Plan:       Hypercholesteremia    Impacted cerumen of right ear  -     Ambulatory referral to ENT      Update labs before f/u.  Will monitor chronic medical issues and continue current plan of  care.      Follow-up in about 6 months (around 9/21/2019), or if symptoms worsen or fail to improve.

## 2019-04-02 RX ORDER — METOPROLOL SUCCINATE 25 MG/1
50 TABLET, EXTENDED RELEASE ORAL DAILY
Qty: 90 TABLET | Refills: 3 | Status: SHIPPED | OUTPATIENT
Start: 2019-04-02 | End: 2019-06-18 | Stop reason: SDUPTHER

## 2019-04-04 ENCOUNTER — TELEPHONE (OUTPATIENT)
Dept: ELECTROPHYSIOLOGY | Facility: CLINIC | Age: 64
End: 2019-04-04

## 2019-04-04 NOTE — TELEPHONE ENCOUNTER
Patient requesting script for metoprolol. He is paying $30 for metoprolol 50 mg. Patient is asking for lower cost alternative  metoprolol 25 mg for $ 8.

## 2019-04-12 ENCOUNTER — CLINICAL SUPPORT (OUTPATIENT)
Dept: AUDIOLOGY | Facility: CLINIC | Age: 64
End: 2019-04-12
Payer: COMMERCIAL

## 2019-04-12 ENCOUNTER — OFFICE VISIT (OUTPATIENT)
Dept: OTOLARYNGOLOGY | Facility: CLINIC | Age: 64
End: 2019-04-12
Payer: COMMERCIAL

## 2019-04-12 VITALS
DIASTOLIC BLOOD PRESSURE: 67 MMHG | SYSTOLIC BLOOD PRESSURE: 114 MMHG | BODY MASS INDEX: 24.99 KG/M2 | HEIGHT: 72 IN | WEIGHT: 184.5 LBS

## 2019-04-12 DIAGNOSIS — H91.93 HIGH FREQUENCY HEARING LOSS OF BOTH EARS: Primary | ICD-10-CM

## 2019-04-12 DIAGNOSIS — H90.3 BILATERAL HIGH FREQUENCY SENSORINEURAL HEARING LOSS: Primary | ICD-10-CM

## 2019-04-12 DIAGNOSIS — H61.23 BILATERAL IMPACTED CERUMEN: ICD-10-CM

## 2019-04-12 PROCEDURE — 99999 PR PBB SHADOW E&M-EST. PATIENT-LVL I: CPT | Mod: PBBFAC,,,

## 2019-04-12 PROCEDURE — 99243 PR OFFICE CONSULTATION,LEVEL III: ICD-10-PCS | Mod: 25,S$GLB,, | Performed by: NURSE PRACTITIONER

## 2019-04-12 PROCEDURE — 99999 PR PBB SHADOW E&M-EST. PATIENT-LVL III: CPT | Mod: PBBFAC,,, | Performed by: NURSE PRACTITIONER

## 2019-04-12 PROCEDURE — 69210 REMOVE IMPACTED EAR WAX UNI: CPT | Mod: S$GLB,,, | Performed by: NURSE PRACTITIONER

## 2019-04-12 PROCEDURE — 92567 TYMPANOMETRY: CPT | Mod: S$GLB,,, | Performed by: AUDIOLOGIST

## 2019-04-12 PROCEDURE — 92557 PR COMPREHENSIVE HEARING TEST: ICD-10-PCS | Mod: S$GLB,,, | Performed by: AUDIOLOGIST

## 2019-04-12 PROCEDURE — 92557 COMPREHENSIVE HEARING TEST: CPT | Mod: S$GLB,,, | Performed by: AUDIOLOGIST

## 2019-04-12 PROCEDURE — 69210 PR REMOVAL IMPACTED CERUMEN REQUIRING INSTRUMENTATION, UNILATERAL: ICD-10-PCS | Mod: S$GLB,,, | Performed by: NURSE PRACTITIONER

## 2019-04-12 PROCEDURE — 99999 PR PBB SHADOW E&M-EST. PATIENT-LVL I: ICD-10-PCS | Mod: PBBFAC,,,

## 2019-04-12 PROCEDURE — 99243 OFF/OP CNSLTJ NEW/EST LOW 30: CPT | Mod: 25,S$GLB,, | Performed by: NURSE PRACTITIONER

## 2019-04-12 PROCEDURE — 99999 PR PBB SHADOW E&M-EST. PATIENT-LVL III: ICD-10-PCS | Mod: PBBFAC,,, | Performed by: NURSE PRACTITIONER

## 2019-04-12 PROCEDURE — 92567 PR TYMPA2METRY: ICD-10-PCS | Mod: S$GLB,,, | Performed by: AUDIOLOGIST

## 2019-04-12 NOTE — LETTER
April 12, 2019      CHRISTY Ruiz MD  [address] [facility] [address] [place] [address]           [facility]  [address] [facility] [address] [place] [address]  Phone: [phone]  Fax: [phone]          Patient: Luke Borden   MR Number: [id number]   YOB: 1955   Date of Visit: 4/12/2019       Dear Dr. CHRISTY Ruiz:    Thank you for referring Luke Borden to me for evaluation. Attached you will find relevant portions of my assessment and plan of care.    If you have questions, please do not hesitate to call me. I look forward to following Luke Borden along with you.    Sincerely,    Monse Mcdonald NP    Enclosure  CC:  No Recipients    If you would like to receive this communication electronically, please contact [email or web address] or [phone] to request more information on [employer] [facility] Link access.    For providers and/or their staff who would like to refer a patient to [facility], please contact us through our one-stop-shop provider referral line, [facility], at [phone].    If you feel you have received this communication in error or would no longer like to receive these types of communications, please e-mail [email or web address]

## 2019-04-12 NOTE — PROGRESS NOTES
Subjective:       Patient ID: Luke Borden is a 63 y.o. male.    Chief Complaint: Other (impacted cerumen )    HPI   Patient is referred by Dr. Ruiz for hearing concerns. Patient states his wife wants him to have a hearing test. He feels that his hearing is fine. He was recently seen by his PCP who told him he had cerumen impactions. He denies otalgia, otorrhea, or any other ENT symptoms or concerns at this time.     Review of Systems   Constitutional: Negative.    HENT: Negative.    Eyes: Negative.    Respiratory: Negative.    Cardiovascular: Negative.    Gastrointestinal: Negative.    Musculoskeletal: Negative.    Skin: Negative.    Neurological: Negative.    Hematological: Negative.    Psychiatric/Behavioral: Negative.        Objective:      Physical Exam   Constitutional: He is oriented to person, place, and time. Vital signs are normal. He appears well-developed and well-nourished. He is cooperative. He does not appear ill. No distress.   HENT:   Head: Normocephalic and atraumatic.   Right Ear: Hearing, tympanic membrane, external ear and ear canal normal. Tympanic membrane is not erythematous. No middle ear effusion.   Left Ear: Hearing, tympanic membrane, external ear and ear canal normal. Tympanic membrane is not erythematous.  No middle ear effusion.   Nose: Nose normal.   Mouth/Throat: Uvula is midline, oropharynx is clear and moist and mucous membranes are normal.     SEPARATE PROCEDURE IN OFFICE:   Procedure: Removal of impacted cerumen, bilateral   Pre Procedure Diagnosis: Cerumen Impaction   Post Procedure Diagnosis: Cerumen Impaction   Verbal informed consent in regards to risk of trauma to ear canal, ear drum or hearing, discomfort during procedure and/or inability to remove cerumen impaction in one session or unforeseen events or complications.   No anesthesia.     Procedure in detail:   Ear canal visualized bilateral with appropriate size ear speculum utilizing Operating Head Binocular  Otomicroscope   Utilizing the following: Ring curet, delicate alligator forceps, and/or suction cannula. The impacted cerumen of the ear canals was removed atraumatically. The TM and EAC were then inspected and found to be clear of wax. See description of TMs/EACs in PE above.   Complications: No   Condition: Improved/Good     Eyes: EOM and lids are normal. Right eye exhibits no discharge. Left eye exhibits no discharge. No scleral icterus.   Neck: Trachea normal and normal range of motion. Neck supple. No tracheal deviation present.   Cardiovascular: Normal rate.   Pulmonary/Chest: Effort normal. No stridor. No respiratory distress. He has no wheezes.   Musculoskeletal: Normal range of motion.   Neurological: He is alert and oriented to person, place, and time. He has normal strength. Coordination and gait normal.   Skin: Skin is warm, dry and intact. He is not diaphoretic. No cyanosis. No pallor.   Psychiatric: He has a normal mood and affect. His speech is normal and behavior is normal. Judgment and thought content normal. Cognition and memory are normal.   Nursing note and vitals reviewed.      Assessment:     Bilateral cerumen impactions removed    Mild/moderate high-frequency SNHL AU  Plan:     Recommend ear protection in loud noise    Recommend annual monitoring of hearing  Return as needed for any ENT concerns

## 2019-04-23 ENCOUNTER — TELEPHONE (OUTPATIENT)
Dept: FAMILY MEDICINE | Facility: CLINIC | Age: 64
End: 2019-04-23

## 2019-04-23 NOTE — TELEPHONE ENCOUNTER
----- Message from Jhonny Luciano sent at 4/23/2019  9:56 AM CDT -----  Type: Needs Medical Advice    Who Called:  Patient  Best Call Back Number: [phone] (home)   Additional Information: Patient would like to speak to office regarding visit on 3.21

## 2019-04-23 NOTE — TELEPHONE ENCOUNTER
Patient states that appt from 03/21 was for Annual and will need to have this visit re coded . States he did not pay copay for this visit.

## 2019-04-24 ENCOUNTER — TELEPHONE (OUTPATIENT)
Dept: FAMILY MEDICINE | Facility: CLINIC | Age: 64
End: 2019-04-24

## 2019-04-24 NOTE — TELEPHONE ENCOUNTER
----- Message from Savannah Collier sent at 4/24/2019 12:04 PM CDT -----  Contact: patient  Type:  Patient Returning Call    Who Called:  patient  Who Left Message for Patient:  alice  Does the patient know what this is regarding?:  yes  Best Call Back Number:  [phone]  Additional Information:  n/a

## 2019-04-24 NOTE — TELEPHONE ENCOUNTER
----- Message from Savannah Collier sent at 4/24/2019 12:04 PM CDT -----  Contact: patient  Type:  Patient Returning Call    Who Called:  patient  Who Left Message for Patient:  alice  Does the patient know what this is regarding?:  yes  Best Call Back Number:  [phone]  Additional Information:  n/a

## 2019-04-25 NOTE — TELEPHONE ENCOUNTER
Patient is insistent that this is his annual visit. As he scheduled it on mychart as Annual as was rescheduled incorrectly by staff as 6 month follow up.

## 2019-04-25 NOTE — TELEPHONE ENCOUNTER
----- Message from Candace Hernandez sent at 4/25/2019  8:39 AM CDT -----  Contact: Patient  Type:  Patient Returning Call    Who Called:  Patient  Who Left Message for Patient: Enrique  Does the patient know what this is regarding?:  Visit with Dr. Ruiz on 3/21/19  Best Call Back Number:

## 2019-05-28 DIAGNOSIS — N40.0 BENIGN PROSTATIC HYPERPLASIA, UNSPECIFIED WHETHER LOWER URINARY TRACT SYMPTOMS PRESENT: ICD-10-CM

## 2019-05-29 RX ORDER — TAMSULOSIN HYDROCHLORIDE 0.4 MG/1
CAPSULE ORAL
Qty: 90 CAPSULE | Refills: 0 | Status: SHIPPED | OUTPATIENT
Start: 2019-05-29 | End: 2019-09-13 | Stop reason: SDUPTHER

## 2019-06-18 DIAGNOSIS — I47.19 ATRIAL TACHYCARDIA: Primary | ICD-10-CM

## 2019-06-19 RX ORDER — METOPROLOL SUCCINATE 25 MG/1
50 TABLET, EXTENDED RELEASE ORAL DAILY
Qty: 90 TABLET | Refills: 3 | Status: SHIPPED | OUTPATIENT
Start: 2019-06-19 | End: 2019-08-01 | Stop reason: SDUPTHER

## 2019-08-01 DIAGNOSIS — I47.19 ATRIAL TACHYCARDIA: ICD-10-CM

## 2019-08-01 RX ORDER — METOPROLOL SUCCINATE 25 MG/1
50 TABLET, EXTENDED RELEASE ORAL DAILY
Qty: 90 TABLET | Refills: 3 | Status: SHIPPED | OUTPATIENT
Start: 2019-08-01 | End: 2020-07-06

## 2019-08-26 ENCOUNTER — TELEPHONE (OUTPATIENT)
Dept: FAMILY MEDICINE | Facility: CLINIC | Age: 64
End: 2019-08-26

## 2019-08-26 NOTE — TELEPHONE ENCOUNTER
----- Message from Aline Penaloza sent at 8/26/2019 11:40 AM CDT -----  Type: Needs Medical Advice    Who Called:  Patient  Best Call Back Number: [phone]  Additional Information: Patient requesting to speak with nurse concerning lab work scheduled on  August 30th/please call patient back to advise. (only information given)

## 2019-09-13 DIAGNOSIS — N40.0 BENIGN PROSTATIC HYPERPLASIA, UNSPECIFIED WHETHER LOWER URINARY TRACT SYMPTOMS PRESENT: ICD-10-CM

## 2019-09-13 RX ORDER — TAMSULOSIN HYDROCHLORIDE 0.4 MG/1
CAPSULE ORAL
Qty: 90 CAPSULE | Refills: 0 | Status: SHIPPED | OUTPATIENT
Start: 2019-09-13 | End: 2019-12-17 | Stop reason: SDUPTHER

## 2019-09-16 ENCOUNTER — LAB VISIT (OUTPATIENT)
Dept: LAB | Facility: HOSPITAL | Age: 64
End: 2019-09-16
Attending: FAMILY MEDICINE
Payer: COMMERCIAL

## 2019-09-16 DIAGNOSIS — Z00.00 WELLNESS EXAMINATION: ICD-10-CM

## 2019-09-16 LAB
ALBUMIN SERPL BCP-MCNC: 3.9 G/DL (ref 3.5–5.2)
ALP SERPL-CCNC: 80 U/L (ref 55–135)
ALT SERPL W/O P-5'-P-CCNC: 18 U/L (ref 10–44)
ANION GAP SERPL CALC-SCNC: 7 MMOL/L (ref 8–16)
AST SERPL-CCNC: 26 U/L (ref 10–40)
BASOPHILS # BLD AUTO: 0.06 K/UL (ref 0–0.2)
BASOPHILS NFR BLD: 1.4 % (ref 0–1.9)
BILIRUB SERPL-MCNC: 0.7 MG/DL (ref 0.1–1)
BUN SERPL-MCNC: 15 MG/DL (ref 8–23)
CALCIUM SERPL-MCNC: 9.2 MG/DL (ref 8.7–10.5)
CHLORIDE SERPL-SCNC: 105 MMOL/L (ref 95–110)
CHOLEST SERPL-MCNC: 167 MG/DL (ref 120–199)
CHOLEST/HDLC SERPL: 2.1 {RATIO} (ref 2–5)
CO2 SERPL-SCNC: 29 MMOL/L (ref 23–29)
COMPLEXED PSA SERPL-MCNC: 0.92 NG/ML (ref 0–4)
CREAT SERPL-MCNC: 0.9 MG/DL (ref 0.5–1.4)
DIFFERENTIAL METHOD: ABNORMAL
EOSINOPHIL # BLD AUTO: 0.2 K/UL (ref 0–0.5)
EOSINOPHIL NFR BLD: 5 % (ref 0–8)
ERYTHROCYTE [DISTWIDTH] IN BLOOD BY AUTOMATED COUNT: 13.6 % (ref 11.5–14.5)
EST. GFR  (AFRICAN AMERICAN): >60 ML/MIN/1.73 M^2
EST. GFR  (NON AFRICAN AMERICAN): >60 ML/MIN/1.73 M^2
GLUCOSE SERPL-MCNC: 93 MG/DL (ref 70–110)
HCT VFR BLD AUTO: 39 % (ref 40–54)
HDLC SERPL-MCNC: 80 MG/DL (ref 40–75)
HDLC SERPL: 47.9 % (ref 20–50)
HGB BLD-MCNC: 12.9 G/DL (ref 14–18)
IMM GRANULOCYTES # BLD AUTO: 0.01 K/UL (ref 0–0.04)
IMM GRANULOCYTES NFR BLD AUTO: 0.2 % (ref 0–0.5)
LDLC SERPL CALC-MCNC: 75 MG/DL (ref 63–159)
LYMPHOCYTES # BLD AUTO: 1.3 K/UL (ref 1–4.8)
LYMPHOCYTES NFR BLD: 28.8 % (ref 18–48)
MCH RBC QN AUTO: 33.1 PG (ref 27–31)
MCHC RBC AUTO-ENTMCNC: 33.1 G/DL (ref 32–36)
MCV RBC AUTO: 100 FL (ref 82–98)
MONOCYTES # BLD AUTO: 0.5 K/UL (ref 0.3–1)
MONOCYTES NFR BLD: 11.9 % (ref 4–15)
NEUTROPHILS # BLD AUTO: 2.3 K/UL (ref 1.8–7.7)
NEUTROPHILS NFR BLD: 52.7 % (ref 38–73)
NONHDLC SERPL-MCNC: 87 MG/DL
NRBC BLD-RTO: 0 /100 WBC
PLATELET # BLD AUTO: 213 K/UL (ref 150–350)
PMV BLD AUTO: 11 FL (ref 9.2–12.9)
POTASSIUM SERPL-SCNC: 4.9 MMOL/L (ref 3.5–5.1)
PROT SERPL-MCNC: 7 G/DL (ref 6–8.4)
RBC # BLD AUTO: 3.9 M/UL (ref 4.6–6.2)
SODIUM SERPL-SCNC: 141 MMOL/L (ref 136–145)
TRIGL SERPL-MCNC: 60 MG/DL (ref 30–150)
TSH SERPL DL<=0.005 MIU/L-ACNC: 1.12 UIU/ML (ref 0.4–4)
WBC # BLD AUTO: 4.37 K/UL (ref 3.9–12.7)

## 2019-09-16 PROCEDURE — 85025 COMPLETE CBC W/AUTO DIFF WBC: CPT

## 2019-09-16 PROCEDURE — 84443 ASSAY THYROID STIM HORMONE: CPT

## 2019-09-16 PROCEDURE — 80053 COMPREHEN METABOLIC PANEL: CPT

## 2019-09-16 PROCEDURE — 36415 COLL VENOUS BLD VENIPUNCTURE: CPT | Mod: PO

## 2019-09-16 PROCEDURE — 84153 ASSAY OF PSA TOTAL: CPT

## 2019-09-16 PROCEDURE — 80061 LIPID PANEL: CPT

## 2019-09-26 ENCOUNTER — OFFICE VISIT (OUTPATIENT)
Dept: FAMILY MEDICINE | Facility: CLINIC | Age: 64
End: 2019-09-26
Payer: COMMERCIAL

## 2019-09-26 ENCOUNTER — LAB VISIT (OUTPATIENT)
Dept: LAB | Facility: HOSPITAL | Age: 64
End: 2019-09-26
Attending: FAMILY MEDICINE
Payer: COMMERCIAL

## 2019-09-26 VITALS
SYSTOLIC BLOOD PRESSURE: 122 MMHG | WEIGHT: 183 LBS | OXYGEN SATURATION: 98 % | BODY MASS INDEX: 24.79 KG/M2 | TEMPERATURE: 99 F | HEART RATE: 57 BPM | HEIGHT: 72 IN | DIASTOLIC BLOOD PRESSURE: 72 MMHG

## 2019-09-26 DIAGNOSIS — R35.1 BENIGN PROSTATIC HYPERPLASIA WITH NOCTURIA: ICD-10-CM

## 2019-09-26 DIAGNOSIS — E78.00 HYPERCHOLESTEREMIA: ICD-10-CM

## 2019-09-26 DIAGNOSIS — N40.1 BENIGN PROSTATIC HYPERPLASIA WITH NOCTURIA: ICD-10-CM

## 2019-09-26 DIAGNOSIS — D64.9 ANEMIA, UNSPECIFIED TYPE: ICD-10-CM

## 2019-09-26 DIAGNOSIS — Z00.00 WELLNESS EXAMINATION: Primary | ICD-10-CM

## 2019-09-26 LAB
FERRITIN SERPL-MCNC: 87 NG/ML (ref 20–300)
FOLATE SERPL-MCNC: 14.3 NG/ML (ref 4–24)
IRON SERPL-MCNC: 85 UG/DL (ref 45–160)
SATURATED IRON: 25 % (ref 20–50)
TOTAL IRON BINDING CAPACITY: 343 UG/DL (ref 250–450)
TRANSFERRIN SERPL-MCNC: 232 MG/DL (ref 200–375)
VIT B12 SERPL-MCNC: 832 PG/ML (ref 210–950)

## 2019-09-26 PROCEDURE — 36415 COLL VENOUS BLD VENIPUNCTURE: CPT | Mod: PO

## 2019-09-26 PROCEDURE — 82746 ASSAY OF FOLIC ACID SERUM: CPT

## 2019-09-26 PROCEDURE — 3074F PR MOST RECENT SYSTOLIC BLOOD PRESSURE < 130 MM HG: ICD-10-PCS | Mod: CPTII,S$GLB,, | Performed by: FAMILY MEDICINE

## 2019-09-26 PROCEDURE — 99999 PR PBB SHADOW E&M-EST. PATIENT-LVL IV: ICD-10-PCS | Mod: PBBFAC,,, | Performed by: FAMILY MEDICINE

## 2019-09-26 PROCEDURE — 3078F DIAST BP <80 MM HG: CPT | Mod: CPTII,S$GLB,, | Performed by: FAMILY MEDICINE

## 2019-09-26 PROCEDURE — 3074F SYST BP LT 130 MM HG: CPT | Mod: CPTII,S$GLB,, | Performed by: FAMILY MEDICINE

## 2019-09-26 PROCEDURE — 99397 PR PREVENTIVE VISIT,EST,65 & OVER: ICD-10-PCS | Mod: S$GLB,,, | Performed by: FAMILY MEDICINE

## 2019-09-26 PROCEDURE — 82728 ASSAY OF FERRITIN: CPT

## 2019-09-26 PROCEDURE — 82607 VITAMIN B-12: CPT

## 2019-09-26 PROCEDURE — 3078F PR MOST RECENT DIASTOLIC BLOOD PRESSURE < 80 MM HG: ICD-10-PCS | Mod: CPTII,S$GLB,, | Performed by: FAMILY MEDICINE

## 2019-09-26 PROCEDURE — 99999 PR PBB SHADOW E&M-EST. PATIENT-LVL IV: CPT | Mod: PBBFAC,,, | Performed by: FAMILY MEDICINE

## 2019-09-26 PROCEDURE — 99397 PER PM REEVAL EST PAT 65+ YR: CPT | Mod: S$GLB,,, | Performed by: FAMILY MEDICINE

## 2019-09-26 PROCEDURE — 83540 ASSAY OF IRON: CPT

## 2019-09-26 RX ORDER — ACETAMINOPHEN 160 MG/5ML
1 SUSPENSION, ORAL (FINAL DOSE FORM) ORAL DAILY
COMMUNITY

## 2019-09-26 NOTE — PROGRESS NOTES
Subjective:       Patient ID: Luke Borden is a 63 y.o. male.    Chief Complaint: Annual Exam    Here for wellness. Doing well overall.  Had labs last week.    Hyperlipidemia   This is a chronic problem. The current episode started more than 1 year ago. The problem is controlled. Pertinent negatives include no chest pain or shortness of breath.     Review of Systems   Constitutional: Negative for activity change, chills, fever and unexpected weight change.   HENT: Negative for hearing loss, rhinorrhea and trouble swallowing.    Eyes: Negative for discharge and visual disturbance.   Respiratory: Negative for cough, chest tightness, shortness of breath and wheezing.    Cardiovascular: Negative for chest pain, palpitations and leg swelling.   Gastrointestinal: Negative for blood in stool, constipation, diarrhea and vomiting.   Endocrine: Positive for polyuria. Negative for cold intolerance, heat intolerance and polydipsia.   Genitourinary: Negative for difficulty urinating, hematuria and urgency.   Musculoskeletal: Negative for arthralgias, joint swelling and neck pain.   Skin: Negative for rash.   Neurological: Negative for weakness and headaches.   Psychiatric/Behavioral: Negative for confusion and dysphoric mood.       Objective:      Physical Exam   Constitutional: He appears well-developed and well-nourished.   HENT:   Head: Normocephalic and atraumatic.       Assessment:       1. Wellness examination    2. Hypercholesteremia    3. Anemia, unspecified type    4. Benign prostatic hyperplasia with nocturia        Plan:       Wellness examination    Hypercholesteremia    Anemia, unspecified type  -     Ferritin; Future; Expected date: 09/26/2019  -     Iron and TIBC; Future; Expected date: 09/26/2019  -     Vitamin B12; Future; Expected date: 09/26/2019  -     Folate; Future; Expected date: 09/26/2019  -     CBC auto differential; Future; Expected date: 09/26/2019    Benign prostatic hyperplasia with  nocturia  -     Ambulatory referral to Urology    started iron recently.  Anemia panel  Cbc 3 months  Will monitor chronic medical issues and continue current plan of care.      Follow up in about 6 months (around 3/26/2020), or if symptoms worsen or fail to improve.

## 2019-09-27 ENCOUNTER — TELEPHONE (OUTPATIENT)
Dept: FAMILY MEDICINE | Facility: CLINIC | Age: 64
End: 2019-09-27

## 2019-09-27 NOTE — TELEPHONE ENCOUNTER
----- Message from Albert Rangel sent at 9/27/2019  3:01 PM CDT -----  Contact: pt  Type:  Patient Returning Call    Who Called:  pt  Who Left Message for Patient:  Talya  Does the patient know what this is regarding?:    Best Call Back Number: [phone]   Additional Information:

## 2019-10-07 ENCOUNTER — OFFICE VISIT (OUTPATIENT)
Dept: UROLOGY | Facility: CLINIC | Age: 64
End: 2019-10-07
Payer: COMMERCIAL

## 2019-10-07 VITALS
HEART RATE: 58 BPM | DIASTOLIC BLOOD PRESSURE: 74 MMHG | HEIGHT: 72 IN | BODY MASS INDEX: 24.79 KG/M2 | SYSTOLIC BLOOD PRESSURE: 117 MMHG | WEIGHT: 183 LBS

## 2019-10-07 DIAGNOSIS — R35.1 NOCTURIA MORE THAN TWICE PER NIGHT: ICD-10-CM

## 2019-10-07 DIAGNOSIS — N40.1 ENLARGED PROSTATE WITH URINARY OBSTRUCTION: Primary | ICD-10-CM

## 2019-10-07 DIAGNOSIS — N13.8 ENLARGED PROSTATE WITH URINARY OBSTRUCTION: Primary | ICD-10-CM

## 2019-10-07 DIAGNOSIS — Z80.42 FAMILY HISTORY OF PROSTATE CANCER: ICD-10-CM

## 2019-10-07 DIAGNOSIS — Z12.5 SCREENING FOR PROSTATE CANCER: ICD-10-CM

## 2019-10-07 PROCEDURE — 99999 PR PBB SHADOW E&M-EST. PATIENT-LVL III: CPT | Mod: PBBFAC,,, | Performed by: UROLOGY

## 2019-10-07 PROCEDURE — 3008F BODY MASS INDEX DOCD: CPT | Mod: CPTII,S$GLB,, | Performed by: UROLOGY

## 2019-10-07 PROCEDURE — 99999 PR PBB SHADOW E&M-EST. PATIENT-LVL III: ICD-10-PCS | Mod: PBBFAC,,, | Performed by: UROLOGY

## 2019-10-07 PROCEDURE — 3008F PR BODY MASS INDEX (BMI) DOCUMENTED: ICD-10-PCS | Mod: CPTII,S$GLB,, | Performed by: UROLOGY

## 2019-10-07 PROCEDURE — 3074F PR MOST RECENT SYSTOLIC BLOOD PRESSURE < 130 MM HG: ICD-10-PCS | Mod: CPTII,S$GLB,, | Performed by: UROLOGY

## 2019-10-07 PROCEDURE — 1100F PR PT FALLS ASSESS DOC 2+ FALLS/FALL W/INJURY/YR: ICD-10-PCS | Mod: CPTII,S$GLB,, | Performed by: UROLOGY

## 2019-10-07 PROCEDURE — 3078F PR MOST RECENT DIASTOLIC BLOOD PRESSURE < 80 MM HG: ICD-10-PCS | Mod: CPTII,S$GLB,, | Performed by: UROLOGY

## 2019-10-07 PROCEDURE — 99204 OFFICE O/P NEW MOD 45 MIN: CPT | Mod: S$GLB,,, | Performed by: UROLOGY

## 2019-10-07 PROCEDURE — 3078F DIAST BP <80 MM HG: CPT | Mod: CPTII,S$GLB,, | Performed by: UROLOGY

## 2019-10-07 PROCEDURE — 3288F PR FALLS RISK ASSESSMENT DOCUMENTED: ICD-10-PCS | Mod: CPTII,S$GLB,, | Performed by: UROLOGY

## 2019-10-07 PROCEDURE — 3288F FALL RISK ASSESSMENT DOCD: CPT | Mod: CPTII,S$GLB,, | Performed by: UROLOGY

## 2019-10-07 PROCEDURE — 3074F SYST BP LT 130 MM HG: CPT | Mod: CPTII,S$GLB,, | Performed by: UROLOGY

## 2019-10-07 PROCEDURE — 1100F PTFALLS ASSESS-DOCD GE2>/YR: CPT | Mod: CPTII,S$GLB,, | Performed by: UROLOGY

## 2019-10-07 PROCEDURE — 99204 PR OFFICE/OUTPT VISIT, NEW, LEVL IV, 45-59 MIN: ICD-10-PCS | Mod: S$GLB,,, | Performed by: UROLOGY

## 2019-10-07 NOTE — LETTER
October 7, 2019      CHRISTY Ruiz MD  [address] [facility] [address]  [facility] [address]           [place] - Urology  [address] [place] [address]  Phone: [phone]  Fax: [phone]          Patient: Luke Borden   MR Number: [id number]   YOB: 1955   Date of Visit: 10/7/2019       Dear Dr. CHRISTY Ruiz:    Thank you for referring Luke Borden to me for evaluation. Attached you will find relevant portions of my assessment and plan of care.    If you have questions, please do not hesitate to call me. I look forward to following Luke Borden along with you.    Sincerely,    CHRISTY Canada MD    Enclosure  CC:  No Recipients    If you would like to receive this communication electronically, please contact [email or web address] or [phone] to request more information on [employer] Link access.    For providers and/or their staff who would like to refer a patient to [facility], please contact us through our one-stop-shop provider referral line, [facility], at [phone].    If you feel you have received this communication in error or would no longer like to receive these types of communications, please e-mail [email or web address]

## 2019-10-07 NOTE — PROGRESS NOTES
Subjective:       Patient ID: Luke Borden is a 63 y.o. male.    Chief Complaint: Benign Prostatic Hypertrophy (consult)    HPI     63-year-old with BPH.  He has been on Flomax for several years.  He says it did improve his symptoms initially.  He still complains of moderate obstructive symptoms.  He has nocturia times 3-4 which is his primary complaint as it affects his sleep.  His IPSS is 12.  He denies hematuria and dysuria.  He has no history of urinary tract infections.  He does have a family history of prostate cancer.  His father was diagnosed with prostate cancer but was never treated and is alive at 95 years old.  His last PSA is less than 1.  We discussed treatment alternatives including Urolift.   Urine dipstick shows negative for all components.   ml    IPSS    Incomplete emptying 1  Fequency  2   Intermittency  1  Urgency  2  Weak stream  2  Straining  1  Sleeping  3  Total:   12    Component PSA, SCREEN   Latest Ref Rng & Units 0.00 - 4.00 ng/mL   9/16/2019 0.92   11/5/2018 0.76   2/10/2017 1.3   2/6/2016 1.0       Past Medical History:   Diagnosis Date    Anticoagulant long-term use     Asthma     Cardiomyopathy     Decreased cardiac ejection fraction     (30% 4/6/17)    Hypercholesteremia     JEANINE on CPAP     PAC (premature atrial contraction)      Past Surgical History:   Procedure Laterality Date    CARDIAC CATHETERIZATION  04/2017    angiogram/[facility], [place]    TONSILLECTOMY      VASECTOMY         Current Outpatient Medications:     apixaban (ELIQUIS) 5 mg Tab, Take 1 tablet (5 mg total) by mouth 2 (two) times daily., Disp: 90 tablet, Rfl: 3    atorvastatin (LIPITOR) 20 MG tablet, Take 1 tablet (20 mg total) by mouth once daily., Disp: 90 tablet, Rfl: 3    cholecalciferol, vitamin D3, (VITAMIN D3) 2,000 unit Cap, Take 1 capsule by mouth once daily., Disp: , Rfl:     metoprolol succinate (TOPROL-XL) 25 MG 24 hr tablet, Take 2 tablets (50 mg total) by mouth once daily., Disp: 90  tablet, Rfl: 3    tamsulosin (FLOMAX) 0.4 mg Cap, TAKE 1 CAPSULE BY MOUTH EVERY DAY, Disp: 90 capsule, Rfl: 0    ubidecarenone (COENZYME Q10) 100 mg Tab, Take 1 tablet by mouth once daily., Disp: , Rfl:     Review of Systems   Constitutional: Negative for fever.   Eyes: Negative for visual disturbance.   Respiratory: Negative for shortness of breath.    Cardiovascular: Negative for chest pain.   Gastrointestinal: Negative for nausea.   Genitourinary: Negative for dysuria and hematuria.   Musculoskeletal: Negative for gait problem.   Skin: Negative for rash.   Neurological: Negative for seizures.   Psychiatric/Behavioral: Negative for confusion.       Objective:      Physical Exam   Constitutional: He is oriented to person, place, and time. He appears well-developed and well-nourished.   HENT:   Head: Normocephalic and atraumatic.   Eyes: Conjunctivae are normal.   Cardiovascular: Normal rate.   Pulmonary/Chest: Effort normal.   Genitourinary: Testes normal and penis normal. Rectal exam shows no mass and anal tone normal. Prostate is enlarged (40g, s/s/a). Prostate is not tender.   Musculoskeletal: Normal range of motion. He exhibits no edema.   Neurological: He is alert and oriented to person, place, and time.   Skin: Skin is warm and dry. No rash noted.   Psychiatric: He has a normal mood and affect.   Vitals reviewed.      Assessment:       1. Enlarged prostate with urinary obstruction    2. Nocturia more than twice per night    3. Family history of prostate cancer    4. Screening for prostate cancer        Plan:       Enlarged prostate with urinary obstruction    Nocturia more than twice per night    Family history of prostate cancer    Screening for prostate cancer      he will consider treatment options would include the addition of finasteride, Urolift or continue Flomax alone.  If he elects to proceed with Urolift, he will need cystoscopy in office

## 2019-10-24 ENCOUNTER — TELEPHONE (OUTPATIENT)
Dept: NEPHROLOGY | Facility: CLINIC | Age: 64
End: 2019-10-24

## 2019-10-24 NOTE — TELEPHONE ENCOUNTER
----- Message from Jackie Phillips sent at 10/24/2019  3:56 PM CDT -----  Contact: Patient  Type: Needs Medical Advice    Who Called:  Patient  Best Call Back Number:   Additional Information: Calling to speak to the nurse. Patient would not go into specifics but advised he wanted to speak about his last vist.

## 2019-10-25 ENCOUNTER — TELEPHONE (OUTPATIENT)
Dept: UROLOGY | Facility: CLINIC | Age: 64
End: 2019-10-25

## 2019-10-28 ENCOUNTER — OFFICE VISIT (OUTPATIENT)
Dept: CARDIOLOGY | Facility: CLINIC | Age: 64
End: 2019-10-28
Payer: COMMERCIAL

## 2019-10-28 VITALS
HEIGHT: 72 IN | BODY MASS INDEX: 24.58 KG/M2 | SYSTOLIC BLOOD PRESSURE: 131 MMHG | WEIGHT: 181.44 LBS | HEART RATE: 76 BPM | DIASTOLIC BLOOD PRESSURE: 79 MMHG

## 2019-10-28 DIAGNOSIS — G47.33 OBSTRUCTIVE SLEEP APNEA SYNDROME: ICD-10-CM

## 2019-10-28 DIAGNOSIS — I47.10 SVT (SUPRAVENTRICULAR TACHYCARDIA): ICD-10-CM

## 2019-10-28 DIAGNOSIS — I47.19 ATRIAL TACHYCARDIA: Chronic | ICD-10-CM

## 2019-10-28 DIAGNOSIS — I47.10 SVT (SUPRAVENTRICULAR TACHYCARDIA): Primary | ICD-10-CM

## 2019-10-28 DIAGNOSIS — I48.0 PAF (PAROXYSMAL ATRIAL FIBRILLATION): Primary | ICD-10-CM

## 2019-10-28 PROCEDURE — 93005 ELECTROCARDIOGRAM TRACING: CPT | Mod: S$GLB,,, | Performed by: INTERNAL MEDICINE

## 2019-10-28 PROCEDURE — 3078F DIAST BP <80 MM HG: CPT | Mod: CPTII,S$GLB,, | Performed by: INTERNAL MEDICINE

## 2019-10-28 PROCEDURE — 3075F SYST BP GE 130 - 139MM HG: CPT | Mod: CPTII,S$GLB,, | Performed by: INTERNAL MEDICINE

## 2019-10-28 PROCEDURE — 93010 EKG 12-LEAD: ICD-10-PCS | Mod: S$GLB,,, | Performed by: INTERNAL MEDICINE

## 2019-10-28 PROCEDURE — 99214 OFFICE O/P EST MOD 30 MIN: CPT | Mod: S$GLB,,, | Performed by: INTERNAL MEDICINE

## 2019-10-28 PROCEDURE — 99999 PR PBB SHADOW E&M-EST. PATIENT-LVL III: ICD-10-PCS | Mod: PBBFAC,,, | Performed by: INTERNAL MEDICINE

## 2019-10-28 PROCEDURE — 93005 EKG 12-LEAD: ICD-10-PCS | Mod: S$GLB,,, | Performed by: INTERNAL MEDICINE

## 2019-10-28 PROCEDURE — 3075F PR MOST RECENT SYSTOLIC BLOOD PRESS GE 130-139MM HG: ICD-10-PCS | Mod: CPTII,S$GLB,, | Performed by: INTERNAL MEDICINE

## 2019-10-28 PROCEDURE — 99214 PR OFFICE/OUTPT VISIT, EST, LEVL IV, 30-39 MIN: ICD-10-PCS | Mod: S$GLB,,, | Performed by: INTERNAL MEDICINE

## 2019-10-28 PROCEDURE — 93010 ELECTROCARDIOGRAM REPORT: CPT | Mod: S$GLB,,, | Performed by: INTERNAL MEDICINE

## 2019-10-28 PROCEDURE — 3008F BODY MASS INDEX DOCD: CPT | Mod: CPTII,S$GLB,, | Performed by: INTERNAL MEDICINE

## 2019-10-28 PROCEDURE — 1101F PR PT FALLS ASSESS DOC 0-1 FALLS W/OUT INJ PAST YR: ICD-10-PCS | Mod: CPTII,S$GLB,, | Performed by: INTERNAL MEDICINE

## 2019-10-28 PROCEDURE — 1101F PT FALLS ASSESS-DOCD LE1/YR: CPT | Mod: CPTII,S$GLB,, | Performed by: INTERNAL MEDICINE

## 2019-10-28 PROCEDURE — 3078F PR MOST RECENT DIASTOLIC BLOOD PRESSURE < 80 MM HG: ICD-10-PCS | Mod: CPTII,S$GLB,, | Performed by: INTERNAL MEDICINE

## 2019-10-28 PROCEDURE — 99999 PR PBB SHADOW E&M-EST. PATIENT-LVL III: CPT | Mod: PBBFAC,,, | Performed by: INTERNAL MEDICINE

## 2019-10-28 PROCEDURE — 3008F PR BODY MASS INDEX (BMI) DOCUMENTED: ICD-10-PCS | Mod: CPTII,S$GLB,, | Performed by: INTERNAL MEDICINE

## 2019-10-28 RX ORDER — FERROUS GLUCONATE 324(38)MG
324 TABLET ORAL
COMMUNITY
End: 2021-03-29

## 2019-10-28 NOTE — PROGRESS NOTES
"Subjective:    Patient ID:  Luke Borden is a 63 y.o. male who presents for follow-up of Atrial tachycardia f/u      HPI 62 yo male with NICM (rate related, resolved), PAC's, PVC's, nonsustained atrial tachycardia, atrial fibrillation, Sleep apnea.    Background:  Primary Cardiologist is Dr. Coleman.  Retired, [employer] .  Presented to PCP for routine visit, noted to have irregular heart beat.  Work-up revealed ectopy and cardiomyopathy.  Echo 2/16/17 EF 30-35%  Holter 2/17/17 2004 PVC's, frequent PAC's and "frequent runs of AT/AF."  Placed on beta blocker and ace inhibitor.  Echo 4/6/17 EF 30% LVEDD 6.2 cm, ALEXIS 28, moderate TR.  48 holter 4/6/17 1605 PVC's, 2431 PAC's frequent 3-20 beat runs of atrial tachycardia.  LHC 4/24/17 normal coronary arteries.  Placed on Sotalol >> no improvement.  We elected to optimize Sleep Apnea management and weight reduction, and reassess.  Continued to have frequent ectopy.  Holter 8/3/17 2065 PAC's with nearly incessant runs in nonsustained atrial tachycardia, 5492 PVC's  Echo 9/22/17 EF 35-40%     PVI (RFA) 10/31/17.  Has noted symptomatic benefit since then.  Holter 2/26/18 NSR with 23 PVC's, 31 PAC's, no AT/AF  Echo 2/26/18 EF 40-  We discontinued Sotalol and initiated Toprol.    Update:  Feels great.  No recurrence of palpitations.  Happy with his energy level.  Echo 10/29/19 normal biventricular structure and function ALEXIS 35  48 hr holter 10/29/18 nsr with 158 PAC's + 1 4 beat run of nonsustained AT      Review of Systems   Constitution: Negative. Negative for malaise/fatigue.   Cardiovascular: Negative for chest pain, dyspnea on exertion, irregular heartbeat, leg swelling, near-syncope, orthopnea, palpitations, paroxysmal nocturnal dyspnea and syncope.   Respiratory: Negative for cough and shortness of breath.    Neurological: Negative for dizziness, light-headedness and weakness.   All other systems reviewed and are negative.       Objective:    Physical " Exam   Constitutional: He is oriented to person, place, and time. He appears well-developed and well-nourished.   Eyes: Conjunctivae are normal. No scleral icterus.   Neck: No JVD present. No tracheal deviation present.   Cardiovascular: Normal rate, regular rhythm and normal heart sounds. PMI is not displaced.   Pulmonary/Chest: Effort normal and breath sounds normal. No respiratory distress.   Abdominal: Soft. There is no hepatosplenomegaly. There is no tenderness.   Musculoskeletal: He exhibits no edema (lower extremity) or tenderness.   Neurological: He is alert and oriented to person, place, and time.   Skin: Skin is warm and dry. No rash noted.   Psychiatric: He has a normal mood and affect. His behavior is normal.         Assessment:       1. PAF (paroxysmal atrial fibrillation)    2. Atrial tachycardia    3. Obstructive sleep apnea syndrome         Plan:       Doing great.  Recommend ILR, given the prior cardiomyopathy, to monitor for AF.  CHADSVASc is 1.  Discontinue Eliquis now and monitor.

## 2019-10-31 ENCOUNTER — TELEPHONE (OUTPATIENT)
Dept: ELECTROPHYSIOLOGY | Facility: CLINIC | Age: 64
End: 2019-10-31

## 2019-10-31 ENCOUNTER — PATIENT MESSAGE (OUTPATIENT)
Dept: ELECTROPHYSIOLOGY | Facility: CLINIC | Age: 64
End: 2019-10-31

## 2019-10-31 DIAGNOSIS — I48.0 PAF (PAROXYSMAL ATRIAL FIBRILLATION): Primary | ICD-10-CM

## 2019-10-31 NOTE — TELEPHONE ENCOUNTER
----- Message from Shay Goodrich MA sent at 10/31/2019  8:32 AM CDT -----  Contact: Patient      ----- Message -----  From: Dia Cool  Sent: 10/31/2019   8:29 AM CDT  To: Joy Escobar Staff    The Pt is calling to speak with Isabel regarding a procedure. Please call him back @ [phone]. Thanks,Dia

## 2019-10-31 NOTE — TELEPHONE ENCOUNTER
Spoke with patient and scheduled ILR for 11/21/2019. Will send all information through patient portal, as requested.

## 2019-11-07 ENCOUNTER — TELEPHONE (OUTPATIENT)
Dept: ELECTROPHYSIOLOGY | Facility: CLINIC | Age: 64
End: 2019-11-07

## 2019-11-07 ENCOUNTER — TELEPHONE (OUTPATIENT)
Dept: ADMINISTRATIVE | Facility: HOSPITAL | Age: 64
End: 2019-11-07

## 2019-11-07 ENCOUNTER — PATIENT OUTREACH (OUTPATIENT)
Dept: ADMINISTRATIVE | Facility: HOSPITAL | Age: 64
End: 2019-11-07

## 2019-11-07 NOTE — TELEPHONE ENCOUNTER
The patient is showing as non-compliant on the Statin Therapy Measure.       Please review if end date is accurate and if patient is currently taking the medication.     Thank You  atorvastatin (LIPITOR) 20 MG tablet ()    Take 1 tablet (20 mg total) by mouth once daily.  END DATE 10/28/19

## 2019-11-07 NOTE — PROGRESS NOTES
The patient is showing as non-compliant on the Statin Therapy Measure.       Please review if end date is accurate and if patient is currently taking the medication.     Thank You  END DATE 10/28/19    atorvastatin (LIPITOR) 20 MG tablet ()    Take 1 tablet (20 mg total) by mouth once daily.

## 2019-11-07 NOTE — TELEPHONE ENCOUNTER
----- Message from Tonia Garcia sent at 11/7/2019  9:43 AM CST -----  Contact: Self  Pt returning your call.    [phone]

## 2019-11-07 NOTE — TELEPHONE ENCOUNTER
Spoke with patient. He is trying to get information for his out of pocket costs for the ILR and monthly monitoring. CPT codes: 93273, 30133, 56555. Procedure is for 11/21/2019. Will ask Molly Garcia to reach out to him.

## 2019-11-07 NOTE — TELEPHONE ENCOUNTER
----- Message from Shay Goodrich MA sent at 11/7/2019  9:31 AM CST -----  Contact: Patient      ----- Message -----  From: Dia Cool  Sent: 11/7/2019   9:25 AM CST  To: Joy Escobar Staff    The Pt is calling to discuss his procedure. Please call him back @ [phone]. Thanks, Dia

## 2019-11-20 ENCOUNTER — TELEPHONE (OUTPATIENT)
Dept: ELECTROPHYSIOLOGY | Facility: CLINIC | Age: 64
End: 2019-11-20

## 2019-11-20 NOTE — TELEPHONE ENCOUNTER
Spoke to Luke Borden    CONFIRMED procedure arrival time of 6am on 11/21  Reiterated instructions including:  -Directions to check in desk  -Confirmed no recent fever, bleeding, infection or skin rash in the past 30 days  -Bathe night prior and morning prior to procedure with Hibiclens solution or an antibacterial soap     Pt verbalizes understanding of above and appreciates call.

## 2019-11-21 ENCOUNTER — HOSPITAL ENCOUNTER (OUTPATIENT)
Facility: HOSPITAL | Age: 64
Discharge: HOME | End: 2019-11-21
Attending: INTERNAL MEDICINE | Admitting: INTERNAL MEDICINE
Payer: COMMERCIAL

## 2019-11-21 VITALS
HEIGHT: 72 IN | WEIGHT: 180 LBS | SYSTOLIC BLOOD PRESSURE: 138 MMHG | DIASTOLIC BLOOD PRESSURE: 63 MMHG | TEMPERATURE: 98 F | HEART RATE: 63 BPM | RESPIRATION RATE: 18 BRPM | BODY MASS INDEX: 24.38 KG/M2 | OXYGEN SATURATION: 99 %

## 2019-11-21 DIAGNOSIS — I48.0 PAF (PAROXYSMAL ATRIAL FIBRILLATION): Primary | ICD-10-CM

## 2019-11-21 DIAGNOSIS — I48.91 ATRIAL FIBRILLATION: ICD-10-CM

## 2019-11-21 PROCEDURE — 93010 EKG 12-LEAD: ICD-10-PCS | Mod: ,,, | Performed by: INTERNAL MEDICINE

## 2019-11-21 PROCEDURE — 25000003 PHARM REV CODE 250: Performed by: INTERNAL MEDICINE

## 2019-11-21 PROCEDURE — 33285 INSJ SUBQ CAR RHYTHM MNTR: CPT | Mod: ,,, | Performed by: INTERNAL MEDICINE

## 2019-11-21 PROCEDURE — 63600175 PHARM REV CODE 636 W HCPCS: Performed by: NURSE PRACTITIONER

## 2019-11-21 PROCEDURE — 33285 INSJ SUBQ CAR RHYTHM MNTR: CPT | Performed by: INTERNAL MEDICINE

## 2019-11-21 PROCEDURE — 93010 ELECTROCARDIOGRAM REPORT: CPT | Mod: ,,, | Performed by: INTERNAL MEDICINE

## 2019-11-21 PROCEDURE — C1764 EVENT RECORDER, CARDIAC: HCPCS | Performed by: INTERNAL MEDICINE

## 2019-11-21 PROCEDURE — 33285 PR INSERTION,SUBQ CARDIAC RHYTHM MONITOR, W/PRGRMG: ICD-10-PCS | Mod: ,,, | Performed by: INTERNAL MEDICINE

## 2019-11-21 PROCEDURE — 93005 ELECTROCARDIOGRAM TRACING: CPT | Mod: 59

## 2019-11-21 DEVICE — MON LNQ11 REVEAL LINQ USA FW2.0
Type: IMPLANTABLE DEVICE | Site: CHEST  WALL | Status: NON-FUNCTIONAL
Brand: REVEAL LINQ™
Removed: 2023-09-05

## 2019-11-21 RX ORDER — CEFAZOLIN SODIUM 1 G/3ML
2 INJECTION, POWDER, FOR SOLUTION INTRAMUSCULAR; INTRAVENOUS
Status: COMPLETED | OUTPATIENT
Start: 2019-11-21 | End: 2019-11-21

## 2019-11-21 RX ORDER — LIDOCAINE HYDROCHLORIDE AND EPINEPHRINE 20; 10 MG/ML; UG/ML
INJECTION, SOLUTION INFILTRATION; PERINEURAL
Status: DISCONTINUED | OUTPATIENT
Start: 2019-11-21 | End: 2019-11-21 | Stop reason: HOSPADM

## 2019-11-21 NOTE — NURSING
Foam dressing to left chest wall clean, dry and intact.  Discharge instructions and medlist given.  Patient and spouse verbalized understanding.  Denies need for escort or wheelchair.  Off unit walking with spouse.

## 2019-11-21 NOTE — PLAN OF CARE
Pt transferred from procedure via stretcher.  Vss.  Post op orders and assessment initiated.  Pt aao, tolerating po.  Family called to bs.  Pt in no acute distress and verbalizes no complaints. Call bell within reach.  Will continue to monitor.

## 2019-11-21 NOTE — DISCHARGE SUMMARY
"[facility] Short Stay Cardiac Unit  Cardiac Electrophysiology  Discharge Summary      Patient Name: Luke Borden  MRN: [id number]  Admission Date: 11/21/2019  Hospital Length of Stay: 0 days  Discharge Date and Time:  11/21/2019 8:19 AM  Attending Physician: Shawn Hamilton MD    Discharging Provider: Markie Rodriguez NP  Primary Care Physician: LALA Ruiz MD    HPI:  65 yo male with NICM (rate related, resolved), PAC's, PVC's, nonsustained atrial tachycardia, atrial fibrillation, Sleep apnea.     Background:  Primary Cardiologist is Dr. Coleman.  Presented to PCP for routine visit, noted to have irregular heart beat.  Work-up revealed ectopy and cardiomyopathy.  Echo 2/16/17 EF 30-35%  Holter 2/17/17 2004 PVC's, frequent PAC's and "frequent runs of AT/AF."  Placed on beta blocker and ace inhibitor.  Echo 4/6/17 EF 30% LVEDD 6.2 cm, ALEXIS 28, moderate TR.  48 holter 4/6/17 1605 PVC's, 2431 PAC's frequent 3-20 beat runs of atrial tachycardia.  LHC 4/24/17 normal coronary arteries.  Placed on Sotalol >> no improvement.   elected to optimize Sleep Apnea management and weight reduction, and reassess.  Continued to have frequent ectopy.  Holter 8/3/17 2065 PAC's with nearly incessant runs in nonsustained atrial tachycardia, 5492 PVC's  Echo 9/22/17 EF 35-40%  PVI (RFA) 10/31/17.  Has noted symptomatic benefit since then.  Holter 2/26/18 NSR with 23 PVC's, 31 PAC's, no AT/AF  Echo 2/26/18 EF 40-   discontinued Sotalol and initiated Toprol.       On Last EP clinic visit  10/28/19  with MD Shawn Hamilton. Patient felt great.  No recurrence of palpitations.  Happy with his energy level.  Echo 10/29/19 normal biventricular structure and function ALEXIS 35  48 hr holter 10/29/18 nsr with 158 PAC's + 1 4 beat run of nonsustained AT  Recommend ILR, given the prior cardiomyopathy, to monitor for AF.  CHADSVASc is 1 > Eliquis was discontinued, and will further monitor via ILR.    Patient has elected to proceed for " planned ILR implant        Patient presented to short stay cardiac unit on 11/21/19 for planned procedure   ,Patient denies any  noted bleeding,  overt shortness of breath, chest pains, rash , fevers, chills, or any other acute symptoms.       Procedure(s) (LRB):  INSERTION, IMPLANTABLE LOOP RECORDER (Left)         Hospital Course:  Pt underwent ILR implant ( see procedure note for details) , tolerated procedure, no acute complications noted. Left chest site with aquacel foam dressing CDI . Patient denies any complains.   Pt to follow up with device clinic in 1 week for wound check , and  3 months with MD Shawn Hamilton   Discharge plans/instructions discussed with patient and his spouse Mrs Butler  who verbalized understanding and agreement of plans of care.  No further questions or concern voiced at this time.        Final Active Diagnoses:    Diagnosis Date Noted POA    PRINCIPAL PROBLEM:  PAF (paroxysmal atrial fibrillation) [I48.0] 10/30/2017 Yes      Problems Resolved During this Admission:       Discharged Condition: good    Disposition: Home or Self Care    Follow Up:  Follow-up Information     [facility] ARRHYTHMIA In 1 week.    Specialty:  Arrhythmia  Why:  For wound re-check  Contact information:  [address]  [place] [address]  [phone]           Shawn Hamilton MD In 3 months.    Specialties:  Electrophysiology, Cardiology  Contact information:  [address]  [facility] [address]  [phone]                 Patient Instructions:      Diet Cardiac     Notify your health care provider if you experience any of the following:  temperature >100.4     Notify your health care provider if you experience any of the following:  persistent nausea and vomiting or diarrhea     Notify your health care provider if you experience any of the following:  severe uncontrolled pain     Notify your health care provider if you experience any of the following:  redness, tenderness, or signs of  infection (pain, swelling, redness, odor or green/yellow discharge around incision site)     Notify your health care provider if you experience any of the following:  difficulty breathing or increased cough     Notify your health care provider if you experience any of the following:  severe persistent headache     Notify your health care provider if you experience any of the following:  worsening rash     Notify your health care provider if you experience any of the following:  persistent dizziness, light-headedness, or visual disturbances     Notify your health care provider if you experience any of the following:  increased confusion or weakness     Notify your health care provider if you experience any of the following:   Order Comments: For any concerning medical symptoms     Remove dressing in 24 hours     Shower on day dressing removed (No bath)     Medications:  Reconciled Home Medications:      Medication List      CONTINUE taking these medications    atorvastatin 20 MG tablet  Commonly known as:  LIPITOR  Take 1 tablet (20 mg total) by mouth once daily.     coenzyme Q10 100 mg Tab  Take 1 tablet by mouth once daily.     ferrous gluconate 324 MG tablet  Commonly known as:  FERGON  Take 324 mg by mouth daily with breakfast.     metoprolol succinate 25 MG 24 hr tablet  Commonly known as:  TOPROL-XL  Take 2 tablets (50 mg total) by mouth once daily.     tamsulosin 0.4 mg Cap  Commonly known as:  FLOMAX  TAKE 1 CAPSULE BY MOUTH EVERY DAY     Vitamin D3 50 mcg (2,000 unit) Cap  Generic drug:  cholecalciferol (vitamin D3)  Take 1 capsule by mouth once daily.            Time spent on the discharge of patient: 15  minutes    Markie Rodriguez NP  Cardiac Electrophysiology  [facility]    STAFF MD Shawn Hamilton

## 2019-11-21 NOTE — INTERVAL H&P NOTE
The patient has been examined and the H&P has been reviewed:    Patient presented to short stay cardiac unit on 11/21/19 for planned procedure   ,Patient denies any  noted bleeding,  overt shortness of breath, chest pains, rash , fevers, chills, or any other acute symptoms.  My interpretation NSR at 65 BPM         PE:   General: Well developed, well nourished, No distress on room air   HEENT: Head is normocephalic, atraumatic;  Lungs: Clear to auscultation bilaterally.  No wheezing. Normal respiratory effort.  Cardiovascular:  S1 S2 Normal rate, regular rhythm and normal heart sounds.    PMI is not displaced  Extremities: No LE edema. Pulses 2+ and symmetric.   Abdomen: Abdomen is soft, non-tender non-distended with normal bowel sounds.  Skin: Skin color, texture, turgor normal. No rashes.  Musculoskeletal: normal range of motion   Neurologic: Normal strength and tone. No focal numbness or weakness.   Psychiatric: normal mood and affect.  behavior is normal. Alert and oriented X 3.  Labs reviewed      PLAN  ILR implantation         Prior to procedure, extensive discussion with patient regarding risks and benefits of  ILR implantation , and patient  would like to proceed.  The patient/ spouse   voices understanding and all questions have been answered. No further questions/concerns voiced at this time.        HEIDI Clifton-BC  Cardiology Electrophysiology  NP   [facility]    Attending: MD Shawn Hamilton                Active Hospital Problems    Diagnosis  POA    PAF (paroxysmal atrial fibrillation) [I48.0]  Yes      Resolved Hospital Problems   No resolved problems to display.

## 2019-11-29 ENCOUNTER — TELEPHONE (OUTPATIENT)
Dept: ELECTROPHYSIOLOGY | Facility: CLINIC | Age: 64
End: 2019-11-29

## 2019-12-03 ENCOUNTER — CLINICAL SUPPORT (OUTPATIENT)
Dept: CARDIOLOGY | Facility: CLINIC | Age: 64
End: 2019-12-03
Attending: INTERNAL MEDICINE
Payer: COMMERCIAL

## 2019-12-03 DIAGNOSIS — I48.0 PAF (PAROXYSMAL ATRIAL FIBRILLATION): ICD-10-CM

## 2019-12-17 DIAGNOSIS — N40.0 BENIGN PROSTATIC HYPERPLASIA, UNSPECIFIED WHETHER LOWER URINARY TRACT SYMPTOMS PRESENT: ICD-10-CM

## 2019-12-17 NOTE — TELEPHONE ENCOUNTER
----- Message from Rasheeda Murillo sent at 12/17/2019  9:33 AM CST -----  Contact: pt [phone]  Patient called and asked for a Refill on his Flomax to be called into [facility] .

## 2019-12-18 RX ORDER — TAMSULOSIN HYDROCHLORIDE 0.4 MG/1
CAPSULE ORAL
Qty: 90 CAPSULE | Refills: 3 | Status: SHIPPED | OUTPATIENT
Start: 2019-12-18

## 2019-12-18 NOTE — PROGRESS NOTES
Refill Authorization Note     is requesting a refill authorization.    Brief assessment and rationale for refill: APPROVE: prr          Medication Therapy Plan: approve 12 more    Medication reconciliation completed: No                         Comments:   Requested Prescriptions   Pending Prescriptions Disp Refills    tamsulosin (FLOMAX) 0.4 mg Cap [Pharmacy Med Name: TAMSULOSIN HCL 0.4 MG CAPSULE] 90 capsule 3     Sig: TAKE 1 CAPSULE BY MOUTH EVERY DAY       Urology: Alpha-Adrenergic Blocker Passed - 12/17/2019  9:35 AM        Passed - Patient is at least 18 years old        Passed - Last BP in normal range within 360 days     BP Readings from Last 3 Encounters:   11/21/19 138/63   10/28/19 131/79   10/07/19 117/74              Passed - Office visit in past 12 months or future 90 days     Recent Outpatient Visits            1 month ago PAF (paroxysmal atrial fibrillation)    [facility] Shawn Hamilton MD    2 months ago Enlarged prostate with urinary obstruction    [facility] Urology CHRISTY Canada MD    2 months ago Wellness examination    [facility] CHRISTY Ruiz MD    8 months ago Bilateral high frequency sensorineural hearing loss    [facility] - ENT Monse Mcdonald NP    9 months ago Hypercholesteremia    [facility] CHRISTY Ruiz MD          Future Appointments              In 1 week LAB, [place] [facility]    In 2 months [facility]    In 2 months [facility]    In 3 months CHRISTY Ruiz MD [facility]                Appointments  past 12m or future 3m with PCP    Date Provider   Last Visit   9/26/2019 CHRISTY Ruiz MD   Next Visit   3/27/2020 CHRISTY Ruiz MD

## 2019-12-21 ENCOUNTER — CLINICAL SUPPORT (OUTPATIENT)
Dept: CARDIOLOGY | Facility: HOSPITAL | Age: 64
End: 2019-12-21
Payer: COMMERCIAL

## 2019-12-21 DIAGNOSIS — Z95.818 PRESENCE OF OTHER CARDIAC IMPLANTS AND GRAFTS: ICD-10-CM

## 2019-12-27 ENCOUNTER — LAB VISIT (OUTPATIENT)
Dept: LAB | Facility: HOSPITAL | Age: 64
End: 2019-12-27
Attending: FAMILY MEDICINE
Payer: COMMERCIAL

## 2019-12-27 DIAGNOSIS — D64.9 ANEMIA, UNSPECIFIED TYPE: ICD-10-CM

## 2019-12-27 LAB
BASOPHILS # BLD AUTO: 0.07 K/UL (ref 0–0.2)
BASOPHILS NFR BLD: 1.5 % (ref 0–1.9)
DIFFERENTIAL METHOD: ABNORMAL
EOSINOPHIL # BLD AUTO: 0.3 K/UL (ref 0–0.5)
EOSINOPHIL NFR BLD: 6.8 % (ref 0–8)
ERYTHROCYTE [DISTWIDTH] IN BLOOD BY AUTOMATED COUNT: 13.2 % (ref 11.5–14.5)
HCT VFR BLD AUTO: 38.8 % (ref 40–54)
HGB BLD-MCNC: 12.4 G/DL (ref 14–18)
IMM GRANULOCYTES # BLD AUTO: 0.01 K/UL (ref 0–0.04)
IMM GRANULOCYTES NFR BLD AUTO: 0.2 % (ref 0–0.5)
LYMPHOCYTES # BLD AUTO: 1.2 K/UL (ref 1–4.8)
LYMPHOCYTES NFR BLD: 24.3 % (ref 18–48)
MCH RBC QN AUTO: 32.5 PG (ref 27–31)
MCHC RBC AUTO-ENTMCNC: 32 G/DL (ref 32–36)
MCV RBC AUTO: 102 FL (ref 82–98)
MONOCYTES # BLD AUTO: 0.6 K/UL (ref 0.3–1)
MONOCYTES NFR BLD: 13.5 % (ref 4–15)
NEUTROPHILS # BLD AUTO: 2.5 K/UL (ref 1.8–7.7)
NEUTROPHILS NFR BLD: 53.7 % (ref 38–73)
NRBC BLD-RTO: 0 /100 WBC
PLATELET # BLD AUTO: 243 K/UL (ref 150–350)
PMV BLD AUTO: 10.7 FL (ref 9.2–12.9)
RBC # BLD AUTO: 3.81 M/UL (ref 4.6–6.2)
WBC # BLD AUTO: 4.73 K/UL (ref 3.9–12.7)

## 2019-12-27 PROCEDURE — 36415 COLL VENOUS BLD VENIPUNCTURE: CPT | Mod: PO

## 2019-12-27 PROCEDURE — 85025 COMPLETE CBC W/AUTO DIFF WBC: CPT

## 2020-01-03 ENCOUNTER — PATIENT MESSAGE (OUTPATIENT)
Dept: CARDIOLOGY | Facility: CLINIC | Age: 65
End: 2020-01-03

## 2020-01-06 ENCOUNTER — TELEPHONE (OUTPATIENT)
Dept: CARDIOLOGY | Facility: CLINIC | Age: 65
End: 2020-01-06

## 2020-01-09 ENCOUNTER — TELEPHONE (OUTPATIENT)
Dept: FAMILY MEDICINE | Facility: CLINIC | Age: 65
End: 2020-01-09

## 2020-01-09 DIAGNOSIS — D64.9 ANEMIA, UNSPECIFIED TYPE: Primary | ICD-10-CM

## 2020-01-09 DIAGNOSIS — Z12.11 SCREEN FOR COLON CANCER: ICD-10-CM

## 2020-01-09 NOTE — TELEPHONE ENCOUNTER
----- Message from Jackie Phillips sent at 1/9/2020  9:58 AM CST -----  Contact: Patient  Type: Needs Medical Advice    Who Called:  Patient  Best Call Back Number:   Additional Information: Calling to speak to the nurse to go over his lab results.

## 2020-01-14 ENCOUNTER — TELEPHONE (OUTPATIENT)
Dept: FAMILY MEDICINE | Facility: CLINIC | Age: 65
End: 2020-01-14

## 2020-01-14 NOTE — TELEPHONE ENCOUNTER
Call placed to patient, LVM to call back to office.     Patient will need scheduling for CSCOPE

## 2020-01-14 NOTE — TELEPHONE ENCOUNTER
Advise update cscope in near future; referred to heme/onc for anemia as h/h still lower than normal

## 2020-01-14 NOTE — TELEPHONE ENCOUNTER
Reviewed results and recommendations pt verbalized understanding.   Please clarify what is meant by updating his cscope?

## 2020-01-15 ENCOUNTER — TELEPHONE (OUTPATIENT)
Dept: FAMILY MEDICINE | Facility: CLINIC | Age: 65
End: 2020-01-15

## 2020-01-15 NOTE — TELEPHONE ENCOUNTER
----- Message from Reina Kirby sent at 1/15/2020 11:55 AM CST -----  Contact: self [phone]  Patient is returning nurse's phone call.  Please call patient back at [phone].

## 2020-01-16 ENCOUNTER — PATIENT MESSAGE (OUTPATIENT)
Dept: GASTROENTEROLOGY | Facility: CLINIC | Age: 65
End: 2020-01-16

## 2020-01-20 ENCOUNTER — CLINICAL SUPPORT (OUTPATIENT)
Dept: CARDIOLOGY | Facility: HOSPITAL | Age: 65
End: 2020-01-20
Payer: COMMERCIAL

## 2020-02-19 ENCOUNTER — CLINICAL SUPPORT (OUTPATIENT)
Dept: CARDIOLOGY | Facility: HOSPITAL | Age: 65
End: 2020-02-19
Payer: COMMERCIAL

## 2020-02-29 ENCOUNTER — PATIENT OUTREACH (OUTPATIENT)
Dept: ADMINISTRATIVE | Facility: OTHER | Age: 65
End: 2020-02-29

## 2020-03-03 ENCOUNTER — OFFICE VISIT (OUTPATIENT)
Dept: CARDIOLOGY | Facility: CLINIC | Age: 65
End: 2020-03-03
Payer: COMMERCIAL

## 2020-03-03 VITALS
WEIGHT: 189.63 LBS | SYSTOLIC BLOOD PRESSURE: 112 MMHG | HEART RATE: 61 BPM | BODY MASS INDEX: 25.68 KG/M2 | DIASTOLIC BLOOD PRESSURE: 75 MMHG | HEIGHT: 72 IN

## 2020-03-03 DIAGNOSIS — I48.0 PAF (PAROXYSMAL ATRIAL FIBRILLATION): ICD-10-CM

## 2020-03-03 DIAGNOSIS — E78.00 HYPERCHOLESTEREMIA: ICD-10-CM

## 2020-03-03 DIAGNOSIS — I49.3 PREMATURE VENTRICULAR CONTRACTIONS (PVCS) (VPCS): ICD-10-CM

## 2020-03-03 DIAGNOSIS — I47.10 SVT (SUPRAVENTRICULAR TACHYCARDIA): ICD-10-CM

## 2020-03-03 DIAGNOSIS — Z86.79 HISTORY OF CARDIOMYOPATHY: Primary | ICD-10-CM

## 2020-03-03 PROCEDURE — 99214 OFFICE O/P EST MOD 30 MIN: CPT | Mod: S$GLB,,, | Performed by: INTERNAL MEDICINE

## 2020-03-03 PROCEDURE — 1101F PR PT FALLS ASSESS DOC 0-1 FALLS W/OUT INJ PAST YR: ICD-10-PCS | Mod: CPTII,S$GLB,, | Performed by: INTERNAL MEDICINE

## 2020-03-03 PROCEDURE — 1126F AMNT PAIN NOTED NONE PRSNT: CPT | Mod: S$GLB,,, | Performed by: INTERNAL MEDICINE

## 2020-03-03 PROCEDURE — 99214 PR OFFICE/OUTPT VISIT, EST, LEVL IV, 30-39 MIN: ICD-10-PCS | Mod: S$GLB,,, | Performed by: INTERNAL MEDICINE

## 2020-03-03 PROCEDURE — 3078F DIAST BP <80 MM HG: CPT | Mod: CPTII,S$GLB,, | Performed by: INTERNAL MEDICINE

## 2020-03-03 PROCEDURE — 3078F PR MOST RECENT DIASTOLIC BLOOD PRESSURE < 80 MM HG: ICD-10-PCS | Mod: CPTII,S$GLB,, | Performed by: INTERNAL MEDICINE

## 2020-03-03 PROCEDURE — 99999 PR PBB SHADOW E&M-EST. PATIENT-LVL III: CPT | Mod: PBBFAC,,, | Performed by: INTERNAL MEDICINE

## 2020-03-03 PROCEDURE — 3074F PR MOST RECENT SYSTOLIC BLOOD PRESSURE < 130 MM HG: ICD-10-PCS | Mod: CPTII,S$GLB,, | Performed by: INTERNAL MEDICINE

## 2020-03-03 PROCEDURE — 1159F MED LIST DOCD IN RCRD: CPT | Mod: S$GLB,,, | Performed by: INTERNAL MEDICINE

## 2020-03-03 PROCEDURE — 1101F PT FALLS ASSESS-DOCD LE1/YR: CPT | Mod: CPTII,S$GLB,, | Performed by: INTERNAL MEDICINE

## 2020-03-03 PROCEDURE — 1126F PR PAIN SEVERITY QUANTIFIED, NO PAIN PRESENT: ICD-10-PCS | Mod: S$GLB,,, | Performed by: INTERNAL MEDICINE

## 2020-03-03 PROCEDURE — 99999 PR PBB SHADOW E&M-EST. PATIENT-LVL III: ICD-10-PCS | Mod: PBBFAC,,, | Performed by: INTERNAL MEDICINE

## 2020-03-03 PROCEDURE — 3074F SYST BP LT 130 MM HG: CPT | Mod: CPTII,S$GLB,, | Performed by: INTERNAL MEDICINE

## 2020-03-03 PROCEDURE — 1159F PR MEDICATION LIST DOCUMENTED IN MEDICAL RECORD: ICD-10-PCS | Mod: S$GLB,,, | Performed by: INTERNAL MEDICINE

## 2020-03-03 NOTE — PROGRESS NOTES
Subjective:    Patient ID:  Luke Borden is a 64 y.o. male who presents for follow-up of PAF f/u      HPI  Here for follow up of PAF-RFA 10/18/PAC/PVC. Patient is exercising regularly with out symptoms.  Patients states is doing well no chest pain, SOB or change in exertional tolerence.       Review of Systems   Constitution: Negative for malaise/fatigue.   Eyes: Negative for blurred vision.   Cardiovascular: Negative for chest pain, claudication, cyanosis, dyspnea on exertion, irregular heartbeat, leg swelling, near-syncope, orthopnea, palpitations, paroxysmal nocturnal dyspnea and syncope.   Respiratory: Negative for cough and shortness of breath.    Hematologic/Lymphatic: Does not bruise/bleed easily.   Musculoskeletal: Negative for back pain, falls, joint pain, muscle cramps, muscle weakness and myalgias.   Gastrointestinal: Negative for abdominal pain, change in bowel habit, nausea and vomiting.   Genitourinary: Negative for urgency.   Neurological: Negative for dizziness, focal weakness and light-headedness.       Past Medical History:   Diagnosis Date    Anticoagulant long-term use     Asthma     Cardiomyopathy     Decreased cardiac ejection fraction     (30% 4/6/17)    Hypercholesteremia     JEANINE on CPAP     PAC (premature atrial contraction)      Patient Active Problem List   Diagnosis    Hypercholesteremia    Benign prostatic hyperplasia    Fatigue    Vitamin D deficiency disease    Overweight    History of cardiomyopathy    Premature ventricular contractions (PVCs) (VPCs)    SVT (supraventricular tachycardia)    Obstructive sleep apnea syndrome    PAF (paroxysmal atrial fibrillation)    Atrial tachycardia        Objective:     There were no vitals filed for this visit.     Physical Exam   Constitutional: He is oriented to person, place, and time. He appears well-developed and well-nourished.   Eyes: Conjunctivae are normal. No scleral icterus.   Neck: No JVD present. No tracheal  deviation present.   Cardiovascular: Normal rate, regular rhythm and normal heart sounds. PMI is not displaced.   Pulmonary/Chest: Effort normal and breath sounds normal. No respiratory distress.   Abdominal: Soft. There is no hepatosplenomegaly. There is no tenderness.   Musculoskeletal: He exhibits no edema (lower extremity) or tenderness.   Neurological: He is alert and oriented to person, place, and time.   Skin: Skin is warm and dry. No rash noted.   Psychiatric: He has a normal mood and affect. His behavior is normal.             ..    Chemistry        Component Value Date/Time     09/16/2019 0737    K 4.9 09/16/2019 0737     09/16/2019 0737    CO2 29 09/16/2019 0737    BUN 15 09/16/2019 0737    CREATININE 0.9 09/16/2019 0737    GLU 93 09/16/2019 0737        Component Value Date/Time    CALCIUM 9.2 09/16/2019 0737    ALKPHOS 80 09/16/2019 0737    AST 26 09/16/2019 0737    ALT 18 09/16/2019 0737    BILITOT 0.7 09/16/2019 0737    ESTGFRAFRICA >60.0 09/16/2019 0737    EGFRNONAA >60.0 09/16/2019 0737            ..  Lab Results   Component Value Date    CHOL 167 09/16/2019    CHOL 149 10/25/2018    CHOL 169 02/10/2017     Lab Results   Component Value Date    HDL 80 (H) 09/16/2019    HDL 66 10/25/2018    HDL 67 02/10/2017     Lab Results   Component Value Date    LDLCALC 75.0 09/16/2019    LDLCALC 75.0 10/25/2018    LDLCALC 85.4 02/10/2017     Lab Results   Component Value Date    TRIG 60 09/16/2019    TRIG 40 10/25/2018    TRIG 83 02/10/2017     Lab Results   Component Value Date    CHOLHDL 47.9 09/16/2019    CHOLHDL 44.3 10/25/2018    CHOLHDL 39.6 02/10/2017     ..  Lab Results   Component Value Date    WBC 4.73 12/27/2019    HGB 12.4 (L) 12/27/2019    HCT 38.8 (L) 12/27/2019     (H) 12/27/2019     12/27/2019       Test(s) Reviewed  I have reviewed the following in detail:  [] Stress test   [] Angiography   [x] Echocardiogram   [x] Labs   [] Other:       Assessment:         ICD-10-CM  ICD-9-CM   1. History of cardiomyopathy Z86.79 V12.59   2. SVT (supraventricular tachycardia) I47.1 427.89   3. Premature ventricular contractions (PVCs) (VPCs) I49.3 427.69   4. PAF (paroxysmal atrial fibrillation) I48.0 427.31   5. Hypercholesteremia E78.00 272.0     Problem List Items Addressed This Visit     SVT (supraventricular tachycardia)    Premature ventricular contractions (PVCs) (VPCs)    PAF (paroxysmal atrial fibrillation)    Hypercholesteremia    History of cardiomyopathy - Primary    Overview     10/2018 EF 60%  2/2017 EF 30 %                Plan:         Return to clinic 12 months   Aerobic exercise 5x's/wk. Heart healthy diet and risk factor modification.    See labs and med orders.      Portions of this note may have been created with voice recognition software.  Grammatical, syntax and spelling errors may be inevitable.

## 2020-03-08 ENCOUNTER — PATIENT OUTREACH (OUTPATIENT)
Dept: ADMINISTRATIVE | Facility: OTHER | Age: 65
End: 2020-03-08

## 2020-03-09 ENCOUNTER — OFFICE VISIT (OUTPATIENT)
Dept: CARDIOLOGY | Facility: CLINIC | Age: 65
End: 2020-03-09
Payer: COMMERCIAL

## 2020-03-09 VITALS
DIASTOLIC BLOOD PRESSURE: 74 MMHG | SYSTOLIC BLOOD PRESSURE: 120 MMHG | BODY MASS INDEX: 25.44 KG/M2 | HEIGHT: 72 IN | HEART RATE: 72 BPM | WEIGHT: 187.81 LBS

## 2020-03-09 DIAGNOSIS — I48.0 PAF (PAROXYSMAL ATRIAL FIBRILLATION): Primary | ICD-10-CM

## 2020-03-09 DIAGNOSIS — I47.10 SVT (SUPRAVENTRICULAR TACHYCARDIA): ICD-10-CM

## 2020-03-09 DIAGNOSIS — I47.19 ATRIAL TACHYCARDIA: Chronic | ICD-10-CM

## 2020-03-09 DIAGNOSIS — Z86.79 HISTORY OF CARDIOMYOPATHY: ICD-10-CM

## 2020-03-09 DIAGNOSIS — G47.33 OBSTRUCTIVE SLEEP APNEA SYNDROME: ICD-10-CM

## 2020-03-09 PROCEDURE — 1101F PR PT FALLS ASSESS DOC 0-1 FALLS W/OUT INJ PAST YR: ICD-10-PCS | Mod: CPTII,S$GLB,, | Performed by: INTERNAL MEDICINE

## 2020-03-09 PROCEDURE — 1126F AMNT PAIN NOTED NONE PRSNT: CPT | Mod: S$GLB,,, | Performed by: INTERNAL MEDICINE

## 2020-03-09 PROCEDURE — 99999 PR PBB SHADOW E&M-EST. PATIENT-LVL III: CPT | Mod: PBBFAC,,, | Performed by: INTERNAL MEDICINE

## 2020-03-09 PROCEDURE — 1159F PR MEDICATION LIST DOCUMENTED IN MEDICAL RECORD: ICD-10-PCS | Mod: S$GLB,,, | Performed by: INTERNAL MEDICINE

## 2020-03-09 PROCEDURE — 3074F SYST BP LT 130 MM HG: CPT | Mod: CPTII,S$GLB,, | Performed by: INTERNAL MEDICINE

## 2020-03-09 PROCEDURE — 1101F PT FALLS ASSESS-DOCD LE1/YR: CPT | Mod: CPTII,S$GLB,, | Performed by: INTERNAL MEDICINE

## 2020-03-09 PROCEDURE — 99999 PR PBB SHADOW E&M-EST. PATIENT-LVL III: ICD-10-PCS | Mod: PBBFAC,,, | Performed by: INTERNAL MEDICINE

## 2020-03-09 PROCEDURE — 1159F MED LIST DOCD IN RCRD: CPT | Mod: S$GLB,,, | Performed by: INTERNAL MEDICINE

## 2020-03-09 PROCEDURE — 99214 PR OFFICE/OUTPT VISIT, EST, LEVL IV, 30-39 MIN: ICD-10-PCS | Mod: S$GLB,,, | Performed by: INTERNAL MEDICINE

## 2020-03-09 PROCEDURE — 3078F PR MOST RECENT DIASTOLIC BLOOD PRESSURE < 80 MM HG: ICD-10-PCS | Mod: CPTII,S$GLB,, | Performed by: INTERNAL MEDICINE

## 2020-03-09 PROCEDURE — 99214 OFFICE O/P EST MOD 30 MIN: CPT | Mod: S$GLB,,, | Performed by: INTERNAL MEDICINE

## 2020-03-09 PROCEDURE — 3078F DIAST BP <80 MM HG: CPT | Mod: CPTII,S$GLB,, | Performed by: INTERNAL MEDICINE

## 2020-03-09 PROCEDURE — 1126F PR PAIN SEVERITY QUANTIFIED, NO PAIN PRESENT: ICD-10-PCS | Mod: S$GLB,,, | Performed by: INTERNAL MEDICINE

## 2020-03-09 PROCEDURE — 3074F PR MOST RECENT SYSTOLIC BLOOD PRESSURE < 130 MM HG: ICD-10-PCS | Mod: CPTII,S$GLB,, | Performed by: INTERNAL MEDICINE

## 2020-03-09 NOTE — PROGRESS NOTES
"Subjective:    Patient ID:  Luke Borden is a 64 y.o. male who presents for follow-up of Atrial Fibrillation (post device insertion)      HPI 65 yo male with NICM (rate related, resolved), PAC's, PVC's, nonsustained atrial tachycardia, atrial fibrillation, Sleep apnea.     Background:  Primary Cardiologist is Dr. Coleman.  Retired, [employer] .  Presented to PCP for routine visit, noted to have irregular heart beat.  Work-up revealed ectopy and cardiomyopathy.  Echo 2/16/17 EF 30-35%  Holter 2/17/17 2004 PVC's, frequent PAC's and "frequent runs of AT/AF."  Placed on beta blocker and ace inhibitor.  Echo 4/6/17 EF 30% LVEDD 6.2 cm, ALEXIS 28, moderate TR.  48 holter 4/6/17 1605 PVC's, 2431 PAC's frequent 3-20 beat runs of atrial tachycardia.  LHC 4/24/17 normal coronary arteries.  Placed on Sotalol >> no improvement.  We elected to optimize Sleep Apnea management and weight reduction, and reassess.  Continued to have frequent ectopy.  Holter 8/3/17 2065 PAC's with nearly incessant runs in nonsustained atrial tachycardia, 5492 PVC's  Echo 9/22/17 EF 35-40%     PVI (RFA) 10/31/17.  Has noted symptomatic benefit since then.  Holter 2/26/18 NSR with 23 PVC's, 31 PAC's, no AT/AF  Echo 2/26/18 EF 40-  We discontinued Sotalol and initiated Toprol.  Echo 10/29/19 normal biventricular structure and function ALEXIS 35  48 hr holter 10/29/18 nsr with 158 PAC's + 1 4 beat run of nonsustained AT    Update:  ILR implanted 11/21/19. Reveals no significant arrhythmias to date.  Continues to feel well. Remains active.       Review of Systems   Constitution: Negative. Negative for malaise/fatigue.   Cardiovascular: Negative for chest pain, dyspnea on exertion, irregular heartbeat, leg swelling, near-syncope, orthopnea, palpitations, paroxysmal nocturnal dyspnea and syncope.   Respiratory: Negative for cough and shortness of breath.    Neurological: Negative for dizziness, light-headedness and weakness.   All other systems " reviewed and are negative.       Objective:    Physical Exam   Constitutional: He is oriented to person, place, and time. He appears well-developed and well-nourished.   Eyes: Conjunctivae are normal. No scleral icterus.   Neck: No JVD present. No tracheal deviation present.   Cardiovascular: Normal rate, regular rhythm and normal heart sounds. PMI is not displaced.   Pulmonary/Chest: Effort normal and breath sounds normal. No respiratory distress.   Abdominal: Soft. There is no hepatosplenomegaly. There is no tenderness.   Musculoskeletal: He exhibits no edema (lower extremity) or tenderness.   Neurological: He is alert and oriented to person, place, and time.   Skin: Skin is warm and dry. No rash noted.   Psychiatric: He has a normal mood and affect. His behavior is normal.         Assessment:       1. PAF (paroxysmal atrial fibrillation)    2. Atrial tachycardia    3. History of cardiomyopathy    4. SVT (supraventricular tachycardia)    5. Obstructive sleep apnea syndrome         Plan:       Doing great.  Weight reduction (he gained 9 lbs).  Continue with monitoring.  F/u in one year.

## 2020-03-20 ENCOUNTER — CLINICAL SUPPORT (OUTPATIENT)
Dept: CARDIOLOGY | Facility: HOSPITAL | Age: 65
End: 2020-03-20
Payer: COMMERCIAL

## 2020-03-20 PROCEDURE — 93298 REM INTERROG DEV EVAL SCRMS: CPT | Mod: ,,, | Performed by: INTERNAL MEDICINE

## 2020-03-20 PROCEDURE — G2066 INTER DEVC REMOTE 30D: HCPCS | Performed by: INTERNAL MEDICINE

## 2020-03-20 PROCEDURE — 93298 CARDIAC DEVICE CHECK - REMOTE: ICD-10-PCS | Mod: ,,, | Performed by: INTERNAL MEDICINE

## 2020-03-23 NOTE — TELEPHONE ENCOUNTER
----- Message from Rasheeda Murillo sent at 3/23/2020 11:37 AM CDT -----  Contact: pt [phone]  Refill     Atorvastatin 20 MG   [facility]   He is almost out of the medication   He is asking for  A call back to confirm [phone]

## 2020-03-23 NOTE — TELEPHONE ENCOUNTER
----- Message from Rasheeda Murillo sent at 3/23/2020 11:37 AM CDT -----  Contact: pt [phone]  Refill     Atorvastatin 20 MG   [facility]   He is almost out of the medication   He is asking for  A call back to confirm [phone]

## 2020-03-25 ENCOUNTER — PATIENT MESSAGE (OUTPATIENT)
Dept: FAMILY MEDICINE | Facility: CLINIC | Age: 65
End: 2020-03-25

## 2020-03-25 RX ORDER — ATORVASTATIN CALCIUM 20 MG/1
TABLET, FILM COATED ORAL
Qty: 90 TABLET | Refills: 3 | Status: SHIPPED | OUTPATIENT
Start: 2020-03-25 | End: 2021-04-21 | Stop reason: SDUPTHER

## 2020-04-02 ENCOUNTER — TELEPHONE (OUTPATIENT)
Dept: FAMILY MEDICINE | Facility: CLINIC | Age: 65
End: 2020-04-02

## 2020-04-02 ENCOUNTER — PATIENT MESSAGE (OUTPATIENT)
Dept: FAMILY MEDICINE | Facility: CLINIC | Age: 65
End: 2020-04-02

## 2020-04-02 NOTE — TELEPHONE ENCOUNTER
Patient wasn't nice to me and hung up on my twice. I called back and left a voicemail that we have to change it to a virtual visit, move the visit out later or cancel the appt

## 2020-04-19 ENCOUNTER — CLINICAL SUPPORT (OUTPATIENT)
Dept: CARDIOLOGY | Facility: HOSPITAL | Age: 65
End: 2020-04-19
Payer: COMMERCIAL

## 2020-04-19 PROCEDURE — 93298 CARDIAC DEVICE CHECK - REMOTE: ICD-10-PCS | Mod: ,,, | Performed by: INTERNAL MEDICINE

## 2020-04-19 PROCEDURE — G2066 INTER DEVC REMOTE 30D: HCPCS | Performed by: INTERNAL MEDICINE

## 2020-04-19 PROCEDURE — 93298 REM INTERROG DEV EVAL SCRMS: CPT | Mod: ,,, | Performed by: INTERNAL MEDICINE

## 2020-05-18 ENCOUNTER — TELEPHONE (OUTPATIENT)
Dept: GASTROENTEROLOGY | Facility: CLINIC | Age: 65
End: 2020-05-18

## 2020-05-19 ENCOUNTER — CLINICAL SUPPORT (OUTPATIENT)
Dept: CARDIOLOGY | Facility: HOSPITAL | Age: 65
End: 2020-05-19
Attending: FAMILY MEDICINE
Payer: COMMERCIAL

## 2020-05-19 PROCEDURE — G2066 INTER DEVC REMOTE 30D: HCPCS | Performed by: INTERNAL MEDICINE

## 2020-05-19 PROCEDURE — 93298 CARDIAC DEVICE CHECK - REMOTE: ICD-10-PCS | Mod: ,,, | Performed by: INTERNAL MEDICINE

## 2020-05-19 PROCEDURE — 93298 REM INTERROG DEV EVAL SCRMS: CPT | Mod: ,,, | Performed by: INTERNAL MEDICINE

## 2020-06-18 ENCOUNTER — CLINICAL SUPPORT (OUTPATIENT)
Dept: CARDIOLOGY | Facility: HOSPITAL | Age: 65
End: 2020-06-18
Payer: COMMERCIAL

## 2020-06-18 DIAGNOSIS — Z95.818 PRESENCE OF OTHER CARDIAC IMPLANTS AND GRAFTS: ICD-10-CM

## 2020-06-18 PROCEDURE — 93298 CARDIAC DEVICE CHECK - REMOTE: ICD-10-PCS | Mod: ,,, | Performed by: INTERNAL MEDICINE

## 2020-06-18 PROCEDURE — 93298 REM INTERROG DEV EVAL SCRMS: CPT | Mod: ,,, | Performed by: INTERNAL MEDICINE

## 2020-06-18 PROCEDURE — G2066 INTER DEVC REMOTE 30D: HCPCS | Performed by: INTERNAL MEDICINE

## 2020-07-18 ENCOUNTER — CLINICAL SUPPORT (OUTPATIENT)
Dept: CARDIOLOGY | Facility: HOSPITAL | Age: 65
End: 2020-07-18
Payer: COMMERCIAL

## 2020-07-18 DIAGNOSIS — Z95.818 PRESENCE OF OTHER CARDIAC IMPLANTS AND GRAFTS: ICD-10-CM

## 2020-07-18 PROCEDURE — 93298 CARDIAC DEVICE CHECK - REMOTE: ICD-10-PCS | Mod: ,,, | Performed by: INTERNAL MEDICINE

## 2020-07-18 PROCEDURE — 93298 REM INTERROG DEV EVAL SCRMS: CPT | Mod: ,,, | Performed by: INTERNAL MEDICINE

## 2020-07-18 PROCEDURE — G2066 INTER DEVC REMOTE 30D: HCPCS | Performed by: INTERNAL MEDICINE

## 2020-08-17 ENCOUNTER — CLINICAL SUPPORT (OUTPATIENT)
Dept: CARDIOLOGY | Facility: HOSPITAL | Age: 65
End: 2020-08-17
Attending: FAMILY MEDICINE
Payer: COMMERCIAL

## 2020-08-17 DIAGNOSIS — Z95.818 PRESENCE OF OTHER CARDIAC IMPLANTS AND GRAFTS: ICD-10-CM

## 2020-08-17 PROCEDURE — G2066 INTER DEVC REMOTE 30D: HCPCS | Performed by: INTERNAL MEDICINE

## 2020-08-17 PROCEDURE — 93298 REM INTERROG DEV EVAL SCRMS: CPT | Mod: ,,, | Performed by: INTERNAL MEDICINE

## 2020-08-17 PROCEDURE — 93298 CARDIAC DEVICE CHECK - REMOTE: ICD-10-PCS | Mod: ,,, | Performed by: INTERNAL MEDICINE

## 2020-09-16 ENCOUNTER — CLINICAL SUPPORT (OUTPATIENT)
Dept: CARDIOLOGY | Facility: HOSPITAL | Age: 65
End: 2020-09-16
Payer: COMMERCIAL

## 2020-09-16 DIAGNOSIS — Z95.818 PRESENCE OF OTHER CARDIAC IMPLANTS AND GRAFTS: ICD-10-CM

## 2020-09-16 PROCEDURE — 93298 REM INTERROG DEV EVAL SCRMS: CPT | Mod: ,,, | Performed by: INTERNAL MEDICINE

## 2020-09-16 PROCEDURE — G2066 INTER DEVC REMOTE 30D: HCPCS | Performed by: INTERNAL MEDICINE

## 2020-09-16 PROCEDURE — 93298 CARDIAC DEVICE CHECK - REMOTE: ICD-10-PCS | Mod: ,,, | Performed by: INTERNAL MEDICINE

## 2020-10-05 ENCOUNTER — PATIENT MESSAGE (OUTPATIENT)
Dept: ADMINISTRATIVE | Facility: HOSPITAL | Age: 65
End: 2020-10-05

## 2020-10-16 ENCOUNTER — CLINICAL SUPPORT (OUTPATIENT)
Dept: CARDIOLOGY | Facility: HOSPITAL | Age: 65
End: 2020-10-16
Attending: FAMILY MEDICINE
Payer: COMMERCIAL

## 2020-10-16 DIAGNOSIS — Z95.818 PRESENCE OF OTHER CARDIAC IMPLANTS AND GRAFTS: ICD-10-CM

## 2020-10-16 PROCEDURE — G2066 INTER DEVC REMOTE 30D: HCPCS | Performed by: INTERNAL MEDICINE

## 2020-10-16 PROCEDURE — 93298 REM INTERROG DEV EVAL SCRMS: CPT | Mod: ,,, | Performed by: INTERNAL MEDICINE

## 2020-10-16 PROCEDURE — 93298 CARDIAC DEVICE CHECK - REMOTE: ICD-10-PCS | Mod: ,,, | Performed by: INTERNAL MEDICINE

## 2020-11-15 ENCOUNTER — CLINICAL SUPPORT (OUTPATIENT)
Dept: CARDIOLOGY | Facility: HOSPITAL | Age: 65
End: 2020-11-15
Payer: COMMERCIAL

## 2020-11-15 DIAGNOSIS — Z95.818 PRESENCE OF OTHER CARDIAC IMPLANTS AND GRAFTS: ICD-10-CM

## 2020-11-15 PROCEDURE — G2066 INTER DEVC REMOTE 30D: HCPCS | Performed by: INTERNAL MEDICINE

## 2020-11-15 PROCEDURE — 93298 REM INTERROG DEV EVAL SCRMS: CPT | Mod: ,,, | Performed by: INTERNAL MEDICINE

## 2020-11-15 PROCEDURE — 93298 CARDIAC DEVICE CHECK - REMOTE: ICD-10-PCS | Mod: ,,, | Performed by: INTERNAL MEDICINE

## 2020-12-15 ENCOUNTER — CLINICAL SUPPORT (OUTPATIENT)
Dept: CARDIOLOGY | Facility: HOSPITAL | Age: 65
End: 2020-12-15
Payer: COMMERCIAL

## 2020-12-15 DIAGNOSIS — Z95.818 PRESENCE OF OTHER CARDIAC IMPLANTS AND GRAFTS: ICD-10-CM

## 2020-12-15 PROCEDURE — 93298 CARDIAC DEVICE CHECK - REMOTE: ICD-10-PCS | Mod: ,,, | Performed by: INTERNAL MEDICINE

## 2020-12-15 PROCEDURE — G2066 INTER DEVC REMOTE 30D: HCPCS | Performed by: INTERNAL MEDICINE

## 2020-12-15 PROCEDURE — 93298 REM INTERROG DEV EVAL SCRMS: CPT | Mod: ,,, | Performed by: INTERNAL MEDICINE

## 2021-01-04 ENCOUNTER — PATIENT MESSAGE (OUTPATIENT)
Dept: ADMINISTRATIVE | Facility: HOSPITAL | Age: 66
End: 2021-01-04

## 2021-01-06 ENCOUNTER — TELEPHONE (OUTPATIENT)
Dept: ELECTROPHYSIOLOGY | Facility: CLINIC | Age: 66
End: 2021-01-06

## 2021-01-07 DIAGNOSIS — N40.0 BENIGN PROSTATIC HYPERPLASIA, UNSPECIFIED WHETHER LOWER URINARY TRACT SYMPTOMS PRESENT: ICD-10-CM

## 2021-01-11 RX ORDER — TAMSULOSIN HYDROCHLORIDE 0.4 MG/1
CAPSULE ORAL
Qty: 90 CAPSULE | Refills: 0 | OUTPATIENT
Start: 2021-01-11

## 2021-01-14 ENCOUNTER — CLINICAL SUPPORT (OUTPATIENT)
Dept: CARDIOLOGY | Facility: HOSPITAL | Age: 66
End: 2021-01-14
Payer: COMMERCIAL

## 2021-01-14 DIAGNOSIS — Z95.818 PRESENCE OF OTHER CARDIAC IMPLANTS AND GRAFTS: ICD-10-CM

## 2021-01-14 PROCEDURE — G2066 INTER DEVC REMOTE 30D: HCPCS | Performed by: INTERNAL MEDICINE

## 2021-01-14 PROCEDURE — 93298 REM INTERROG DEV EVAL SCRMS: CPT | Mod: ,,, | Performed by: INTERNAL MEDICINE

## 2021-01-14 PROCEDURE — 93298 CARDIAC DEVICE CHECK - REMOTE: ICD-10-PCS | Mod: ,,, | Performed by: INTERNAL MEDICINE

## 2021-01-24 NOTE — PLAN OF CARE
Problem: Fall Risk (Adult)  Intervention: Safety Precautions  See epic      Comments: See epic   self-care/home management/community/leisure

## 2021-02-13 ENCOUNTER — CLINICAL SUPPORT (OUTPATIENT)
Dept: CARDIOLOGY | Facility: HOSPITAL | Age: 66
End: 2021-02-13
Payer: COMMERCIAL

## 2021-02-13 DIAGNOSIS — Z95.818 PRESENCE OF OTHER CARDIAC IMPLANTS AND GRAFTS: ICD-10-CM

## 2021-02-13 PROCEDURE — 93298 CARDIAC DEVICE CHECK - REMOTE: ICD-10-PCS | Mod: ,,, | Performed by: INTERNAL MEDICINE

## 2021-02-13 PROCEDURE — 93298 REM INTERROG DEV EVAL SCRMS: CPT | Mod: ,,, | Performed by: INTERNAL MEDICINE

## 2021-02-13 PROCEDURE — G2066 INTER DEVC REMOTE 30D: HCPCS | Performed by: INTERNAL MEDICINE

## 2021-02-18 ENCOUNTER — PATIENT OUTREACH (OUTPATIENT)
Dept: ADMINISTRATIVE | Facility: OTHER | Age: 66
End: 2021-02-18

## 2021-03-03 ENCOUNTER — LAB VISIT (OUTPATIENT)
Dept: LAB | Facility: HOSPITAL | Age: 66
End: 2021-03-03
Attending: INTERNAL MEDICINE
Payer: COMMERCIAL

## 2021-03-03 DIAGNOSIS — I49.3 PREMATURE VENTRICULAR CONTRACTIONS (PVCS) (VPCS): ICD-10-CM

## 2021-03-03 DIAGNOSIS — Z86.79 HISTORY OF CARDIOMYOPATHY: ICD-10-CM

## 2021-03-03 DIAGNOSIS — I48.0 PAF (PAROXYSMAL ATRIAL FIBRILLATION): ICD-10-CM

## 2021-03-03 DIAGNOSIS — E78.00 HYPERCHOLESTEREMIA: ICD-10-CM

## 2021-03-03 LAB
ALBUMIN SERPL BCP-MCNC: 3.8 G/DL (ref 3.5–5.2)
ALP SERPL-CCNC: 65 U/L (ref 55–135)
ALT SERPL W/O P-5'-P-CCNC: 21 U/L (ref 10–44)
ANION GAP SERPL CALC-SCNC: 7 MMOL/L (ref 8–16)
AST SERPL-CCNC: 30 U/L (ref 10–40)
BILIRUB SERPL-MCNC: 0.7 MG/DL (ref 0.1–1)
BUN SERPL-MCNC: 13 MG/DL (ref 8–23)
CALCIUM SERPL-MCNC: 8.8 MG/DL (ref 8.7–10.5)
CHLORIDE SERPL-SCNC: 104 MMOL/L (ref 95–110)
CHOLEST SERPL-MCNC: 172 MG/DL (ref 120–199)
CHOLEST/HDLC SERPL: 2.2 {RATIO} (ref 2–5)
CO2 SERPL-SCNC: 29 MMOL/L (ref 23–29)
CREAT SERPL-MCNC: 0.8 MG/DL (ref 0.5–1.4)
EST. GFR  (AFRICAN AMERICAN): >60 ML/MIN/1.73 M^2
EST. GFR  (NON AFRICAN AMERICAN): >60 ML/MIN/1.73 M^2
GLUCOSE SERPL-MCNC: 93 MG/DL (ref 70–110)
HDLC SERPL-MCNC: 79 MG/DL (ref 40–75)
HDLC SERPL: 45.9 % (ref 20–50)
LDLC SERPL CALC-MCNC: 77.4 MG/DL (ref 63–159)
NONHDLC SERPL-MCNC: 93 MG/DL
POTASSIUM SERPL-SCNC: 4.9 MMOL/L (ref 3.5–5.1)
PROT SERPL-MCNC: 6.7 G/DL (ref 6–8.4)
SODIUM SERPL-SCNC: 140 MMOL/L (ref 136–145)
TRIGL SERPL-MCNC: 78 MG/DL (ref 30–150)

## 2021-03-03 PROCEDURE — 36415 COLL VENOUS BLD VENIPUNCTURE: CPT | Mod: PO

## 2021-03-03 PROCEDURE — 80053 COMPREHEN METABOLIC PANEL: CPT | Performed by: INTERNAL MEDICINE

## 2021-03-03 PROCEDURE — 80061 LIPID PANEL: CPT | Performed by: INTERNAL MEDICINE

## 2021-03-15 ENCOUNTER — CLINICAL SUPPORT (OUTPATIENT)
Dept: CARDIOLOGY | Facility: HOSPITAL | Age: 66
End: 2021-03-15
Payer: COMMERCIAL

## 2021-03-15 DIAGNOSIS — Z95.818 PRESENCE OF OTHER CARDIAC IMPLANTS AND GRAFTS: ICD-10-CM

## 2021-03-15 PROCEDURE — 93298 REM INTERROG DEV EVAL SCRMS: CPT | Mod: ,,, | Performed by: INTERNAL MEDICINE

## 2021-03-15 PROCEDURE — G2066 INTER DEVC REMOTE 30D: HCPCS | Performed by: INTERNAL MEDICINE

## 2021-03-15 PROCEDURE — 93298 CARDIAC DEVICE CHECK - REMOTE: ICD-10-PCS | Mod: ,,, | Performed by: INTERNAL MEDICINE

## 2021-03-29 ENCOUNTER — OFFICE VISIT (OUTPATIENT)
Dept: CARDIOLOGY | Facility: CLINIC | Age: 66
End: 2021-03-29
Payer: COMMERCIAL

## 2021-03-29 VITALS
WEIGHT: 190.25 LBS | HEART RATE: 66 BPM | HEIGHT: 72 IN | DIASTOLIC BLOOD PRESSURE: 73 MMHG | SYSTOLIC BLOOD PRESSURE: 122 MMHG | BODY MASS INDEX: 25.77 KG/M2

## 2021-03-29 DIAGNOSIS — I48.0 PAF (PAROXYSMAL ATRIAL FIBRILLATION): Primary | ICD-10-CM

## 2021-03-29 DIAGNOSIS — I47.10 SVT (SUPRAVENTRICULAR TACHYCARDIA): ICD-10-CM

## 2021-03-29 DIAGNOSIS — G47.33 OBSTRUCTIVE SLEEP APNEA SYNDROME: ICD-10-CM

## 2021-03-29 DIAGNOSIS — I47.19 ATRIAL TACHYCARDIA: Chronic | ICD-10-CM

## 2021-03-29 DIAGNOSIS — I49.3 PREMATURE VENTRICULAR CONTRACTIONS (PVCS) (VPCS): ICD-10-CM

## 2021-03-29 PROCEDURE — 3078F DIAST BP <80 MM HG: CPT | Mod: CPTII,S$GLB,, | Performed by: INTERNAL MEDICINE

## 2021-03-29 PROCEDURE — 3008F PR BODY MASS INDEX (BMI) DOCUMENTED: ICD-10-PCS | Mod: CPTII,S$GLB,, | Performed by: INTERNAL MEDICINE

## 2021-03-29 PROCEDURE — 99214 PR OFFICE/OUTPT VISIT, EST, LEVL IV, 30-39 MIN: ICD-10-PCS | Mod: S$GLB,,, | Performed by: INTERNAL MEDICINE

## 2021-03-29 PROCEDURE — 1101F PT FALLS ASSESS-DOCD LE1/YR: CPT | Mod: CPTII,S$GLB,, | Performed by: INTERNAL MEDICINE

## 2021-03-29 PROCEDURE — 1126F AMNT PAIN NOTED NONE PRSNT: CPT | Mod: S$GLB,,, | Performed by: INTERNAL MEDICINE

## 2021-03-29 PROCEDURE — 1159F MED LIST DOCD IN RCRD: CPT | Mod: S$GLB,,, | Performed by: INTERNAL MEDICINE

## 2021-03-29 PROCEDURE — 3288F FALL RISK ASSESSMENT DOCD: CPT | Mod: CPTII,S$GLB,, | Performed by: INTERNAL MEDICINE

## 2021-03-29 PROCEDURE — 3074F PR MOST RECENT SYSTOLIC BLOOD PRESSURE < 130 MM HG: ICD-10-PCS | Mod: CPTII,S$GLB,, | Performed by: INTERNAL MEDICINE

## 2021-03-29 PROCEDURE — 1101F PR PT FALLS ASSESS DOC 0-1 FALLS W/OUT INJ PAST YR: ICD-10-PCS | Mod: CPTII,S$GLB,, | Performed by: INTERNAL MEDICINE

## 2021-03-29 PROCEDURE — 99999 PR PBB SHADOW E&M-EST. PATIENT-LVL III: CPT | Mod: PBBFAC,,, | Performed by: INTERNAL MEDICINE

## 2021-03-29 PROCEDURE — 99214 OFFICE O/P EST MOD 30 MIN: CPT | Mod: S$GLB,,, | Performed by: INTERNAL MEDICINE

## 2021-03-29 PROCEDURE — 1159F PR MEDICATION LIST DOCUMENTED IN MEDICAL RECORD: ICD-10-PCS | Mod: S$GLB,,, | Performed by: INTERNAL MEDICINE

## 2021-03-29 PROCEDURE — 1126F PR PAIN SEVERITY QUANTIFIED, NO PAIN PRESENT: ICD-10-PCS | Mod: S$GLB,,, | Performed by: INTERNAL MEDICINE

## 2021-03-29 PROCEDURE — 3074F SYST BP LT 130 MM HG: CPT | Mod: CPTII,S$GLB,, | Performed by: INTERNAL MEDICINE

## 2021-03-29 PROCEDURE — 3288F PR FALLS RISK ASSESSMENT DOCUMENTED: ICD-10-PCS | Mod: CPTII,S$GLB,, | Performed by: INTERNAL MEDICINE

## 2021-03-29 PROCEDURE — 99999 PR PBB SHADOW E&M-EST. PATIENT-LVL III: ICD-10-PCS | Mod: PBBFAC,,, | Performed by: INTERNAL MEDICINE

## 2021-03-29 PROCEDURE — 3008F BODY MASS INDEX DOCD: CPT | Mod: CPTII,S$GLB,, | Performed by: INTERNAL MEDICINE

## 2021-03-29 PROCEDURE — 3078F PR MOST RECENT DIASTOLIC BLOOD PRESSURE < 80 MM HG: ICD-10-PCS | Mod: CPTII,S$GLB,, | Performed by: INTERNAL MEDICINE

## 2021-04-06 ENCOUNTER — PATIENT MESSAGE (OUTPATIENT)
Dept: ADMINISTRATIVE | Facility: HOSPITAL | Age: 66
End: 2021-04-06

## 2021-04-14 ENCOUNTER — CLINICAL SUPPORT (OUTPATIENT)
Dept: CARDIOLOGY | Facility: HOSPITAL | Age: 66
End: 2021-04-14
Payer: COMMERCIAL

## 2021-04-14 DIAGNOSIS — Z95.818 PRESENCE OF OTHER CARDIAC IMPLANTS AND GRAFTS: ICD-10-CM

## 2021-04-14 PROCEDURE — 93298 REM INTERROG DEV EVAL SCRMS: CPT | Mod: ,,, | Performed by: INTERNAL MEDICINE

## 2021-04-14 PROCEDURE — G2066 INTER DEVC REMOTE 30D: HCPCS | Performed by: INTERNAL MEDICINE

## 2021-04-14 PROCEDURE — 93298 CARDIAC DEVICE CHECK - REMOTE: ICD-10-PCS | Mod: ,,, | Performed by: INTERNAL MEDICINE

## 2021-04-21 ENCOUNTER — OFFICE VISIT (OUTPATIENT)
Dept: CARDIOLOGY | Facility: CLINIC | Age: 66
End: 2021-04-21
Payer: COMMERCIAL

## 2021-04-21 VITALS
SYSTOLIC BLOOD PRESSURE: 116 MMHG | BODY MASS INDEX: 26.01 KG/M2 | HEART RATE: 69 BPM | DIASTOLIC BLOOD PRESSURE: 71 MMHG | WEIGHT: 192 LBS | HEIGHT: 72 IN

## 2021-04-21 DIAGNOSIS — I49.3 PREMATURE VENTRICULAR CONTRACTIONS (PVCS) (VPCS): ICD-10-CM

## 2021-04-21 DIAGNOSIS — I47.10 SVT (SUPRAVENTRICULAR TACHYCARDIA): ICD-10-CM

## 2021-04-21 DIAGNOSIS — I47.19 ATRIAL TACHYCARDIA: ICD-10-CM

## 2021-04-21 DIAGNOSIS — G47.33 OBSTRUCTIVE SLEEP APNEA SYNDROME: ICD-10-CM

## 2021-04-21 DIAGNOSIS — Z86.79 HISTORY OF CARDIOMYOPATHY: Primary | ICD-10-CM

## 2021-04-21 DIAGNOSIS — I48.0 PAF (PAROXYSMAL ATRIAL FIBRILLATION): ICD-10-CM

## 2021-04-21 DIAGNOSIS — E78.00 HYPERCHOLESTEREMIA: ICD-10-CM

## 2021-04-21 PROCEDURE — 3008F BODY MASS INDEX DOCD: CPT | Mod: CPTII,S$GLB,, | Performed by: INTERNAL MEDICINE

## 2021-04-21 PROCEDURE — 99213 OFFICE O/P EST LOW 20 MIN: CPT | Mod: S$GLB,,, | Performed by: INTERNAL MEDICINE

## 2021-04-21 PROCEDURE — 99999 PR PBB SHADOW E&M-EST. PATIENT-LVL III: CPT | Mod: PBBFAC,,, | Performed by: INTERNAL MEDICINE

## 2021-04-21 PROCEDURE — 99999 PR PBB SHADOW E&M-EST. PATIENT-LVL III: ICD-10-PCS | Mod: PBBFAC,,, | Performed by: INTERNAL MEDICINE

## 2021-04-21 PROCEDURE — 3288F FALL RISK ASSESSMENT DOCD: CPT | Mod: CPTII,S$GLB,, | Performed by: INTERNAL MEDICINE

## 2021-04-21 PROCEDURE — 99213 PR OFFICE/OUTPT VISIT, EST, LEVL III, 20-29 MIN: ICD-10-PCS | Mod: S$GLB,,, | Performed by: INTERNAL MEDICINE

## 2021-04-21 PROCEDURE — 3288F PR FALLS RISK ASSESSMENT DOCUMENTED: ICD-10-PCS | Mod: CPTII,S$GLB,, | Performed by: INTERNAL MEDICINE

## 2021-04-21 PROCEDURE — 1126F PR PAIN SEVERITY QUANTIFIED, NO PAIN PRESENT: ICD-10-PCS | Mod: S$GLB,,, | Performed by: INTERNAL MEDICINE

## 2021-04-21 PROCEDURE — 1101F PR PT FALLS ASSESS DOC 0-1 FALLS W/OUT INJ PAST YR: ICD-10-PCS | Mod: CPTII,S$GLB,, | Performed by: INTERNAL MEDICINE

## 2021-04-21 PROCEDURE — 1159F MED LIST DOCD IN RCRD: CPT | Mod: S$GLB,,, | Performed by: INTERNAL MEDICINE

## 2021-04-21 PROCEDURE — 1159F PR MEDICATION LIST DOCUMENTED IN MEDICAL RECORD: ICD-10-PCS | Mod: S$GLB,,, | Performed by: INTERNAL MEDICINE

## 2021-04-21 PROCEDURE — 3008F PR BODY MASS INDEX (BMI) DOCUMENTED: ICD-10-PCS | Mod: CPTII,S$GLB,, | Performed by: INTERNAL MEDICINE

## 2021-04-21 PROCEDURE — 1101F PT FALLS ASSESS-DOCD LE1/YR: CPT | Mod: CPTII,S$GLB,, | Performed by: INTERNAL MEDICINE

## 2021-04-21 PROCEDURE — 1126F AMNT PAIN NOTED NONE PRSNT: CPT | Mod: S$GLB,,, | Performed by: INTERNAL MEDICINE

## 2021-04-21 RX ORDER — ATORVASTATIN CALCIUM 20 MG/1
20 TABLET, FILM COATED ORAL DAILY
Qty: 90 TABLET | Refills: 3 | Status: SHIPPED | OUTPATIENT
Start: 2021-04-21 | End: 2022-04-19 | Stop reason: SDUPTHER

## 2021-05-14 ENCOUNTER — CLINICAL SUPPORT (OUTPATIENT)
Dept: CARDIOLOGY | Facility: HOSPITAL | Age: 66
End: 2021-05-14
Attending: FAMILY MEDICINE
Payer: COMMERCIAL

## 2021-05-14 DIAGNOSIS — Z95.818 PRESENCE OF OTHER CARDIAC IMPLANTS AND GRAFTS: ICD-10-CM

## 2021-05-14 PROCEDURE — 93298 REM INTERROG DEV EVAL SCRMS: CPT | Mod: ,,, | Performed by: INTERNAL MEDICINE

## 2021-05-14 PROCEDURE — G2066 INTER DEVC REMOTE 30D: HCPCS | Performed by: INTERNAL MEDICINE

## 2021-05-14 PROCEDURE — 93298 CARDIAC DEVICE CHECK - REMOTE: ICD-10-PCS | Mod: ,,, | Performed by: INTERNAL MEDICINE

## 2021-06-13 ENCOUNTER — CLINICAL SUPPORT (OUTPATIENT)
Dept: CARDIOLOGY | Facility: HOSPITAL | Age: 66
End: 2021-06-13
Payer: COMMERCIAL

## 2021-06-13 DIAGNOSIS — Z95.818 PRESENCE OF OTHER CARDIAC IMPLANTS AND GRAFTS: ICD-10-CM

## 2021-06-13 PROCEDURE — 93298 REM INTERROG DEV EVAL SCRMS: CPT | Mod: ,,, | Performed by: INTERNAL MEDICINE

## 2021-06-13 PROCEDURE — 93298 CARDIAC DEVICE CHECK - REMOTE: ICD-10-PCS | Mod: ,,, | Performed by: INTERNAL MEDICINE

## 2021-06-13 PROCEDURE — G2066 INTER DEVC REMOTE 30D: HCPCS | Performed by: INTERNAL MEDICINE

## 2021-07-07 ENCOUNTER — PATIENT MESSAGE (OUTPATIENT)
Dept: ADMINISTRATIVE | Facility: HOSPITAL | Age: 66
End: 2021-07-07

## 2021-07-13 ENCOUNTER — CLINICAL SUPPORT (OUTPATIENT)
Dept: CARDIOLOGY | Facility: HOSPITAL | Age: 66
End: 2021-07-13
Payer: COMMERCIAL

## 2021-07-13 DIAGNOSIS — Z95.818 PRESENCE OF OTHER CARDIAC IMPLANTS AND GRAFTS: ICD-10-CM

## 2021-07-13 PROCEDURE — 93298 CARDIAC DEVICE CHECK - REMOTE: ICD-10-PCS | Mod: ,,, | Performed by: INTERNAL MEDICINE

## 2021-07-13 PROCEDURE — 93298 REM INTERROG DEV EVAL SCRMS: CPT | Mod: ,,, | Performed by: INTERNAL MEDICINE

## 2021-07-13 PROCEDURE — G2066 INTER DEVC REMOTE 30D: HCPCS | Performed by: INTERNAL MEDICINE

## 2021-08-12 ENCOUNTER — CLINICAL SUPPORT (OUTPATIENT)
Dept: CARDIOLOGY | Facility: HOSPITAL | Age: 66
End: 2021-08-12
Payer: COMMERCIAL

## 2021-08-12 DIAGNOSIS — Z95.818 PRESENCE OF OTHER CARDIAC IMPLANTS AND GRAFTS: ICD-10-CM

## 2021-08-12 PROCEDURE — G2066 INTER DEVC REMOTE 30D: HCPCS | Performed by: INTERNAL MEDICINE

## 2021-08-12 PROCEDURE — 93298 REM INTERROG DEV EVAL SCRMS: CPT | Mod: ,,, | Performed by: INTERNAL MEDICINE

## 2021-08-12 PROCEDURE — 93298 CARDIAC DEVICE CHECK - REMOTE: ICD-10-PCS | Mod: ,,, | Performed by: INTERNAL MEDICINE

## 2021-08-19 ENCOUNTER — TELEPHONE (OUTPATIENT)
Dept: ELECTROPHYSIOLOGY | Facility: CLINIC | Age: 66
End: 2021-08-19

## 2021-09-11 ENCOUNTER — CLINICAL SUPPORT (OUTPATIENT)
Dept: CARDIOLOGY | Facility: HOSPITAL | Age: 66
End: 2021-09-11
Payer: COMMERCIAL

## 2021-09-11 DIAGNOSIS — Z95.818 PRESENCE OF OTHER CARDIAC IMPLANTS AND GRAFTS: ICD-10-CM

## 2021-09-11 PROCEDURE — G2066 INTER DEVC REMOTE 30D: HCPCS | Performed by: INTERNAL MEDICINE

## 2021-09-11 PROCEDURE — 93298 REM INTERROG DEV EVAL SCRMS: CPT | Mod: ,,, | Performed by: INTERNAL MEDICINE

## 2021-09-11 PROCEDURE — 93298 CARDIAC DEVICE CHECK - REMOTE: ICD-10-PCS | Mod: ,,, | Performed by: INTERNAL MEDICINE

## 2021-09-22 ENCOUNTER — TELEPHONE (OUTPATIENT)
Dept: CARDIOLOGY | Facility: CLINIC | Age: 66
End: 2021-09-22

## 2021-10-11 ENCOUNTER — CLINICAL SUPPORT (OUTPATIENT)
Dept: CARDIOLOGY | Facility: HOSPITAL | Age: 66
End: 2021-10-11
Payer: COMMERCIAL

## 2021-10-11 DIAGNOSIS — Z95.818 PRESENCE OF OTHER CARDIAC IMPLANTS AND GRAFTS: ICD-10-CM

## 2021-10-11 PROCEDURE — 93298 REM INTERROG DEV EVAL SCRMS: CPT | Mod: ,,, | Performed by: INTERNAL MEDICINE

## 2021-10-11 PROCEDURE — 93298 CARDIAC DEVICE CHECK - REMOTE: ICD-10-PCS | Mod: ,,, | Performed by: INTERNAL MEDICINE

## 2021-10-11 PROCEDURE — G2066 INTER DEVC REMOTE 30D: HCPCS | Performed by: INTERNAL MEDICINE

## 2021-11-10 ENCOUNTER — CLINICAL SUPPORT (OUTPATIENT)
Dept: CARDIOLOGY | Facility: HOSPITAL | Age: 66
End: 2021-11-10
Payer: COMMERCIAL

## 2021-11-10 DIAGNOSIS — Z95.818 PRESENCE OF OTHER CARDIAC IMPLANTS AND GRAFTS: ICD-10-CM

## 2021-11-10 PROCEDURE — G2066 INTER DEVC REMOTE 30D: HCPCS | Performed by: INTERNAL MEDICINE

## 2021-11-10 PROCEDURE — 93298 REM INTERROG DEV EVAL SCRMS: CPT | Mod: ,,, | Performed by: INTERNAL MEDICINE

## 2021-11-10 PROCEDURE — 93298 CARDIAC DEVICE CHECK - REMOTE: ICD-10-PCS | Mod: ,,, | Performed by: INTERNAL MEDICINE

## 2021-12-10 ENCOUNTER — CLINICAL SUPPORT (OUTPATIENT)
Dept: CARDIOLOGY | Facility: HOSPITAL | Age: 66
End: 2021-12-10
Attending: FAMILY MEDICINE
Payer: COMMERCIAL

## 2021-12-10 DIAGNOSIS — Z95.818 PRESENCE OF OTHER CARDIAC IMPLANTS AND GRAFTS: ICD-10-CM

## 2021-12-10 PROCEDURE — G2066 INTER DEVC REMOTE 30D: HCPCS | Performed by: INTERNAL MEDICINE

## 2021-12-10 PROCEDURE — 93298 REM INTERROG DEV EVAL SCRMS: CPT | Mod: ,,, | Performed by: INTERNAL MEDICINE

## 2021-12-10 PROCEDURE — 93298 CARDIAC DEVICE CHECK - REMOTE: ICD-10-PCS | Mod: ,,, | Performed by: INTERNAL MEDICINE

## 2022-01-06 ENCOUNTER — TELEPHONE (OUTPATIENT)
Dept: ELECTROPHYSIOLOGY | Facility: CLINIC | Age: 67
End: 2022-01-06
Payer: COMMERCIAL

## 2022-01-06 ENCOUNTER — TELEPHONE (OUTPATIENT)
Dept: CARDIOLOGY | Facility: HOSPITAL | Age: 67
End: 2022-01-06
Payer: COMMERCIAL

## 2022-01-06 NOTE — TELEPHONE ENCOUNTER
Spoke with patient. He spoke with Amanda earlier today and reviewed his ILR transmission with her. No further questions at this time.

## 2022-01-06 NOTE — TELEPHONE ENCOUNTER
----- Message from Nubia Cowan MA sent at 1/6/2022  9:01 AM CST -----  Regarding: FW: Call back    ----- Message -----  From: Kylie Alva  Sent: 1/6/2022   8:39 AM CST  To: [facility] Staff  Subject: Call back                                        PT would like to discuss results with the nurse.  PT can be reached @ [phone].      Thanks

## 2022-01-06 NOTE — TELEPHONE ENCOUNTER
Spoke with patient and informed him that his remote ILR transmission is WNL.  ----- Message from Dia Cool sent at 1/6/2022 10:04 AM CST -----  Regarding: transmission readings  The pt is calling to speak with someone regarding his transmissions. Please call him back @ [phone]. Thanks, Dia

## 2022-01-09 ENCOUNTER — CLINICAL SUPPORT (OUTPATIENT)
Dept: CARDIOLOGY | Facility: HOSPITAL | Age: 67
End: 2022-01-09
Payer: COMMERCIAL

## 2022-01-09 DIAGNOSIS — Z95.818 PRESENCE OF OTHER CARDIAC IMPLANTS AND GRAFTS: ICD-10-CM

## 2022-01-09 PROCEDURE — G2066 INTER DEVC REMOTE 30D: HCPCS | Performed by: INTERNAL MEDICINE

## 2022-01-09 PROCEDURE — 93298 CARDIAC DEVICE CHECK - REMOTE: ICD-10-PCS | Mod: ,,, | Performed by: INTERNAL MEDICINE

## 2022-01-09 PROCEDURE — 93298 REM INTERROG DEV EVAL SCRMS: CPT | Mod: ,,, | Performed by: INTERNAL MEDICINE

## 2022-02-08 ENCOUNTER — CLINICAL SUPPORT (OUTPATIENT)
Dept: CARDIOLOGY | Facility: HOSPITAL | Age: 67
End: 2022-02-08
Payer: COMMERCIAL

## 2022-02-08 DIAGNOSIS — Z95.818 PRESENCE OF OTHER CARDIAC IMPLANTS AND GRAFTS: ICD-10-CM

## 2022-02-08 PROCEDURE — G2066 INTER DEVC REMOTE 30D: HCPCS | Performed by: INTERNAL MEDICINE

## 2022-02-17 ENCOUNTER — PATIENT MESSAGE (OUTPATIENT)
Dept: CARDIOLOGY | Facility: CLINIC | Age: 67
End: 2022-02-17
Payer: COMMERCIAL

## 2022-02-17 NOTE — TELEPHONE ENCOUNTER
----- Message from Skylar Zuñiga sent at 2/17/2022  8:47 AM CST -----  Regarding: advice  Contact: Patient/[phone]  Type: Needs Medical Advice  Who Called:  Patient/[phone]    Additional Information: Would like to talk to Linda about his upcoming echo. Please call for the details.

## 2022-02-22 ENCOUNTER — LAB VISIT (OUTPATIENT)
Dept: LAB | Facility: HOSPITAL | Age: 67
End: 2022-02-22
Attending: INTERNAL MEDICINE
Payer: COMMERCIAL

## 2022-02-22 ENCOUNTER — CLINICAL SUPPORT (OUTPATIENT)
Dept: CARDIOLOGY | Facility: HOSPITAL | Age: 67
End: 2022-02-22
Attending: INTERNAL MEDICINE
Payer: COMMERCIAL

## 2022-02-22 VITALS — WEIGHT: 192 LBS | HEART RATE: 68 BPM | HEIGHT: 72 IN | BODY MASS INDEX: 26.01 KG/M2

## 2022-02-22 DIAGNOSIS — G47.33 OBSTRUCTIVE SLEEP APNEA SYNDROME: ICD-10-CM

## 2022-02-22 DIAGNOSIS — I48.0 PAF (PAROXYSMAL ATRIAL FIBRILLATION): ICD-10-CM

## 2022-02-22 DIAGNOSIS — E78.00 HYPERCHOLESTEREMIA: ICD-10-CM

## 2022-02-22 DIAGNOSIS — Z86.79 HISTORY OF CARDIOMYOPATHY: ICD-10-CM

## 2022-02-22 DIAGNOSIS — I47.19 ATRIAL TACHYCARDIA: Chronic | ICD-10-CM

## 2022-02-22 DIAGNOSIS — I47.10 SVT (SUPRAVENTRICULAR TACHYCARDIA): ICD-10-CM

## 2022-02-22 LAB
ALBUMIN SERPL BCP-MCNC: 3.9 G/DL (ref 3.5–5.2)
ALP SERPL-CCNC: 70 U/L (ref 55–135)
ALT SERPL W/O P-5'-P-CCNC: 20 U/L (ref 10–44)
ANION GAP SERPL CALC-SCNC: 7 MMOL/L (ref 8–16)
ASCENDING AORTA: 3.12 CM
AST SERPL-CCNC: 30 U/L (ref 10–40)
AV INDEX (PROSTH): 0.7
AV MEAN GRADIENT: 4 MMHG
AV PEAK GRADIENT: 8 MMHG
AV VALVE AREA: 2.19 CM2
AV VELOCITY RATIO: 0.76
BASOPHILS # BLD AUTO: 0.05 K/UL (ref 0–0.2)
BASOPHILS NFR BLD: 1.1 % (ref 0–1.9)
BILIRUB SERPL-MCNC: 0.7 MG/DL (ref 0.1–1)
BSA FOR ECHO PROCEDURE: 2.1 M2
BUN SERPL-MCNC: 15 MG/DL (ref 8–23)
CALCIUM SERPL-MCNC: 9.4 MG/DL (ref 8.7–10.5)
CHLORIDE SERPL-SCNC: 104 MMOL/L (ref 95–110)
CHOLEST SERPL-MCNC: 177 MG/DL (ref 120–199)
CHOLEST/HDLC SERPL: 2.2 {RATIO} (ref 2–5)
CO2 SERPL-SCNC: 28 MMOL/L (ref 23–29)
CREAT SERPL-MCNC: 0.9 MG/DL (ref 0.5–1.4)
CV ECHO LV RWT: 0.26 CM
DIFFERENTIAL METHOD: ABNORMAL
DOP CALC AO PEAK VEL: 1.42 M/S
DOP CALC AO VTI: 31.59 CM
DOP CALC LVOT AREA: 3.1 CM2
DOP CALC LVOT DIAMETER: 2 CM
DOP CALC LVOT PEAK VEL: 1.08 M/S
DOP CALC LVOT STROKE VOLUME: 69.08 CM3
DOP CALCLVOT PEAK VEL VTI: 22 CM
E WAVE DECELERATION TIME: 304.3 MSEC
E/A RATIO: 1.58
E/E' RATIO: 5.73 M/S
ECHO LV POSTERIOR WALL: 0.71 CM (ref 0.6–1.1)
EJECTION FRACTION: 55 %
EOSINOPHIL # BLD AUTO: 0.2 K/UL (ref 0–0.5)
EOSINOPHIL NFR BLD: 3.4 % (ref 0–8)
ERYTHROCYTE [DISTWIDTH] IN BLOOD BY AUTOMATED COUNT: 12.8 % (ref 11.5–14.5)
EST. GFR  (AFRICAN AMERICAN): >60 ML/MIN/1.73 M^2
EST. GFR  (NON AFRICAN AMERICAN): >60 ML/MIN/1.73 M^2
FRACTIONAL SHORTENING: 34 % (ref 28–44)
GLUCOSE SERPL-MCNC: 96 MG/DL (ref 70–110)
HCT VFR BLD AUTO: 40.7 % (ref 40–54)
HDLC SERPL-MCNC: 79 MG/DL (ref 40–75)
HDLC SERPL: 44.6 % (ref 20–50)
HGB BLD-MCNC: 13.3 G/DL (ref 14–18)
IMM GRANULOCYTES # BLD AUTO: 0.01 K/UL (ref 0–0.04)
IMM GRANULOCYTES NFR BLD AUTO: 0.2 % (ref 0–0.5)
INTERVENTRICULAR SEPTUM: 0.83 CM (ref 0.6–1.1)
IVRT: 94.2 MSEC
LA MAJOR: 6.15 CM
LA MINOR: 6.11 CM
LA WIDTH: 4.35 CM
LDLC SERPL CALC-MCNC: 86.6 MG/DL (ref 63–159)
LEFT ATRIUM SIZE: 3.75 CM
LEFT ATRIUM VOLUME INDEX: 40.7 ML/M2
LEFT ATRIUM VOLUME: 85 CM3
LEFT INTERNAL DIMENSION IN SYSTOLE: 3.65 CM (ref 2.1–4)
LEFT VENTRICLE DIASTOLIC VOLUME INDEX: 70.11 ML/M2
LEFT VENTRICLE DIASTOLIC VOLUME: 146.53 ML
LEFT VENTRICLE MASS INDEX: 73 G/M2
LEFT VENTRICLE SYSTOLIC VOLUME INDEX: 26.9 ML/M2
LEFT VENTRICLE SYSTOLIC VOLUME: 56.32 ML
LEFT VENTRICULAR INTERNAL DIMENSION IN DIASTOLE: 5.49 CM (ref 3.5–6)
LEFT VENTRICULAR MASS: 151.99 G
LV LATERAL E/E' RATIO: 4.85 M/S
LV SEPTAL E/E' RATIO: 7 M/S
LYMPHOCYTES # BLD AUTO: 1.3 K/UL (ref 1–4.8)
LYMPHOCYTES NFR BLD: 30.1 % (ref 18–48)
MCH RBC QN AUTO: 32.8 PG (ref 27–31)
MCHC RBC AUTO-ENTMCNC: 32.7 G/DL (ref 32–36)
MCV RBC AUTO: 100 FL (ref 82–98)
MONOCYTES # BLD AUTO: 0.6 K/UL (ref 0.3–1)
MONOCYTES NFR BLD: 13 % (ref 4–15)
MV A" WAVE DURATION": 15.13 MSEC
MV PEAK A VEL: 0.4 M/S
MV PEAK E VEL: 0.63 M/S
MV STENOSIS PRESSURE HALF TIME: 88.25 MS
MV VALVE AREA P 1/2 METHOD: 2.49 CM2
NEUTROPHILS # BLD AUTO: 2.3 K/UL (ref 1.8–7.7)
NEUTROPHILS NFR BLD: 52.2 % (ref 38–73)
NONHDLC SERPL-MCNC: 98 MG/DL
NRBC BLD-RTO: 0 /100 WBC
PISA MRMAX VEL: 0.04 M/S
PISA TR MAX VEL: 2.79 M/S
PLATELET # BLD AUTO: 265 K/UL (ref 150–450)
PMV BLD AUTO: 10.3 FL (ref 9.2–12.9)
POTASSIUM SERPL-SCNC: 4.7 MMOL/L (ref 3.5–5.1)
PROT SERPL-MCNC: 7 G/DL (ref 6–8.4)
PULM VEIN S/D RATIO: 1
PV PEAK D VEL: 0.4 M/S
PV PEAK S VEL: 0.4 M/S
RA MAJOR: 6.4 CM
RA PRESSURE: 3 MMHG
RA WIDTH: 3.37 CM
RBC # BLD AUTO: 4.06 M/UL (ref 4.6–6.2)
RIGHT VENTRICULAR END-DIASTOLIC DIMENSION: 3.48 CM
RV TISSUE DOPPLER FREE WALL SYSTOLIC VELOCITY 1 (APICAL 4 CHAMBER VIEW): 17.33 CM/S
SINUS: 3.22 CM
SODIUM SERPL-SCNC: 139 MMOL/L (ref 136–145)
STJ: 2.36 CM
TDI LATERAL: 0.13 M/S
TDI SEPTAL: 0.09 M/S
TDI: 0.11 M/S
TR MAX PG: 31 MMHG
TRICUSPID ANNULAR PLANE SYSTOLIC EXCURSION: 2.36 CM
TRIGL SERPL-MCNC: 57 MG/DL (ref 30–150)
TV REST PULMONARY ARTERY PRESSURE: 34 MMHG
WBC # BLD AUTO: 4.45 K/UL (ref 3.9–12.7)

## 2022-02-22 PROCEDURE — 85025 COMPLETE CBC W/AUTO DIFF WBC: CPT | Performed by: INTERNAL MEDICINE

## 2022-02-22 PROCEDURE — 80061 LIPID PANEL: CPT | Performed by: INTERNAL MEDICINE

## 2022-02-22 PROCEDURE — 36415 COLL VENOUS BLD VENIPUNCTURE: CPT | Mod: PO | Performed by: INTERNAL MEDICINE

## 2022-02-22 PROCEDURE — 93306 TTE W/DOPPLER COMPLETE: CPT | Mod: PO

## 2022-02-22 PROCEDURE — 80053 COMPREHEN METABOLIC PANEL: CPT | Performed by: INTERNAL MEDICINE

## 2022-02-24 ENCOUNTER — PATIENT MESSAGE (OUTPATIENT)
Dept: CARDIOLOGY | Facility: CLINIC | Age: 67
End: 2022-02-24
Payer: COMMERCIAL

## 2022-03-09 ENCOUNTER — TELEPHONE (OUTPATIENT)
Dept: CARDIOLOGY | Facility: HOSPITAL | Age: 67
End: 2022-03-09
Payer: COMMERCIAL

## 2022-03-10 ENCOUNTER — CLINICAL SUPPORT (OUTPATIENT)
Dept: CARDIOLOGY | Facility: HOSPITAL | Age: 67
End: 2022-03-10
Payer: COMMERCIAL

## 2022-03-10 DIAGNOSIS — Z95.818 PRESENCE OF OTHER CARDIAC IMPLANTS AND GRAFTS: ICD-10-CM

## 2022-03-10 PROCEDURE — 93298 REM INTERROG DEV EVAL SCRMS: CPT | Mod: ,,, | Performed by: INTERNAL MEDICINE

## 2022-03-10 PROCEDURE — G2066 INTER DEVC REMOTE 30D: HCPCS | Performed by: INTERNAL MEDICINE

## 2022-03-10 PROCEDURE — 93298 CARDIAC DEVICE CHECK - REMOTE: ICD-10-PCS | Mod: ,,, | Performed by: INTERNAL MEDICINE

## 2022-03-11 NOTE — PROGRESS NOTES
"Subjective:    Patient ID:  Luke Borden is a 66 y.o. male who presents for follow-up of PAF (Annual f/u )      HPI 67 yo male with NICM (rate related, resolved), PAC's, PVC's, nonsustained atrial tachycardia, atrial fibrillation, Sleep apnea (on CPAP).     Background:  Primary Cardiologist is Dr. Coleman.  Retired, [employer] .  Presented to PCP for routine visit, noted to have irregular heart beat.  Work-up revealed ectopy and cardiomyopathy.  Echo 2/16/17 EF 30-35%  Holter 2/17/17 2004 PVC's, frequent PAC's and "frequent runs of AT/AF."  Placed on beta blocker and ace inhibitor.  Echo 4/6/17 EF 30% LVEDD 6.2 cm, ALEXIS 28, moderate TR.  48 holter 4/6/17 1605 PVC's, 2431 PAC's frequent 3-20 beat runs of atrial tachycardia.  LHC 4/24/17 normal coronary arteries.  Placed on Sotalol >> no improvement.  We elected to optimize Sleep Apnea management and weight reduction, and reassess.  Continued to have frequent ectopy.  Holter 8/3/17 2065 PAC's with nearly incessant runs in nonsustained atrial tachycardia, 5492 PVC's  Echo 9/22/17 EF 35-40%     PVI (RFA) 10/31/17.  Has noted symptomatic benefit since then.  Holter 2/26/18 NSR with 23 PVC's, 31 PAC's, no AT/AF  Echo 2/26/18 EF 40-  We discontinued Sotalol and initiated Toprol.  Echo 10/29/19 normal biventricular structure and function ALEXIS 35  48 hr holter 10/29/18 nsr with 158 PAC's + 1 4 beat run of nonsustained AT     ILR implanted 11/21/19. Reveals no significant arrhythmias to date.     Update:    Continues to do well. Remains free of symptoms c/w atrial fibrillation.  Exercising (strength training and bicycle).  ILR reveals no significant arrhythmias.    Echo 2/22/22 EF 55% normal RV structure and function, moderate LAE.    Review of Systems   Constitutional: Negative. Negative for fever and malaise/fatigue.   HENT: Negative for congestion and sore throat.    Cardiovascular: Negative for chest pain, dyspnea on exertion, irregular heartbeat, leg " swelling, near-syncope, orthopnea, palpitations, paroxysmal nocturnal dyspnea and syncope.   Respiratory: Negative for cough and shortness of breath.    Gastrointestinal: Negative for abdominal pain, constipation and diarrhea.   Neurological: Negative for dizziness, light-headedness and weakness.   Psychiatric/Behavioral: Negative for depression. The patient is not nervous/anxious.    All other systems reviewed and are negative.       Objective:    Physical Exam  Constitutional:       Appearance: He is well-developed.   Eyes:      General: No scleral icterus.     Conjunctiva/sclera: Conjunctivae normal.   Neck:      Vascular: No JVD.      Trachea: No tracheal deviation.   Cardiovascular:      Rate and Rhythm: Normal rate and regular rhythm.      Chest Wall: PMI is not displaced.      Heart sounds: Normal heart sounds.   Pulmonary:      Effort: Pulmonary effort is normal. No respiratory distress.      Breath sounds: Normal breath sounds.   Abdominal:      Palpations: Abdomen is soft.      Tenderness: There is no abdominal tenderness.   Musculoskeletal:         General: No tenderness.   Skin:     General: Skin is warm and dry.      Findings: No rash.   Neurological:      Mental Status: He is alert and oriented to person, place, and time.   Psychiatric:         Behavior: Behavior normal.           Assessment:       1. PAF (paroxysmal atrial fibrillation)    2. SVT (supraventricular tachycardia)    3. Premature ventricular contractions (PVCs) (VPCs)    4. Atrial tachycardia    5. Obstructive sleep apnea syndrome         Plan:       Doing great.  Continue with monitoring as scheduled. Recommend replacement when he reaches RRT.  F/u in one year.

## 2022-03-14 ENCOUNTER — OFFICE VISIT (OUTPATIENT)
Dept: CARDIOLOGY | Facility: CLINIC | Age: 67
End: 2022-03-14
Payer: COMMERCIAL

## 2022-03-14 ENCOUNTER — TELEPHONE (OUTPATIENT)
Dept: CARDIOLOGY | Facility: HOSPITAL | Age: 67
End: 2022-03-14
Payer: COMMERCIAL

## 2022-03-14 VITALS
HEART RATE: 65 BPM | HEIGHT: 72 IN | DIASTOLIC BLOOD PRESSURE: 73 MMHG | BODY MASS INDEX: 25.98 KG/M2 | SYSTOLIC BLOOD PRESSURE: 125 MMHG | WEIGHT: 191.81 LBS

## 2022-03-14 DIAGNOSIS — I47.19 ATRIAL TACHYCARDIA: Chronic | ICD-10-CM

## 2022-03-14 DIAGNOSIS — I49.3 PREMATURE VENTRICULAR CONTRACTIONS (PVCS) (VPCS): ICD-10-CM

## 2022-03-14 DIAGNOSIS — I47.10 SVT (SUPRAVENTRICULAR TACHYCARDIA): ICD-10-CM

## 2022-03-14 DIAGNOSIS — I48.0 PAF (PAROXYSMAL ATRIAL FIBRILLATION): Primary | ICD-10-CM

## 2022-03-14 DIAGNOSIS — G47.33 OBSTRUCTIVE SLEEP APNEA SYNDROME: ICD-10-CM

## 2022-03-14 PROCEDURE — 3008F PR BODY MASS INDEX (BMI) DOCUMENTED: ICD-10-PCS | Mod: CPTII,S$GLB,, | Performed by: INTERNAL MEDICINE

## 2022-03-14 PROCEDURE — 1159F PR MEDICATION LIST DOCUMENTED IN MEDICAL RECORD: ICD-10-PCS | Mod: CPTII,S$GLB,, | Performed by: INTERNAL MEDICINE

## 2022-03-14 PROCEDURE — 1126F AMNT PAIN NOTED NONE PRSNT: CPT | Mod: CPTII,S$GLB,, | Performed by: INTERNAL MEDICINE

## 2022-03-14 PROCEDURE — 99215 PR OFFICE/OUTPT VISIT, EST, LEVL V, 40-54 MIN: ICD-10-PCS | Mod: S$GLB,,, | Performed by: INTERNAL MEDICINE

## 2022-03-14 PROCEDURE — 3078F PR MOST RECENT DIASTOLIC BLOOD PRESSURE < 80 MM HG: ICD-10-PCS | Mod: CPTII,S$GLB,, | Performed by: INTERNAL MEDICINE

## 2022-03-14 PROCEDURE — 99215 OFFICE O/P EST HI 40 MIN: CPT | Mod: S$GLB,,, | Performed by: INTERNAL MEDICINE

## 2022-03-14 PROCEDURE — 3008F BODY MASS INDEX DOCD: CPT | Mod: CPTII,S$GLB,, | Performed by: INTERNAL MEDICINE

## 2022-03-14 PROCEDURE — 99999 PR PBB SHADOW E&M-EST. PATIENT-LVL III: CPT | Mod: PBBFAC,,, | Performed by: INTERNAL MEDICINE

## 2022-03-14 PROCEDURE — 99999 PR PBB SHADOW E&M-EST. PATIENT-LVL III: ICD-10-PCS | Mod: PBBFAC,,, | Performed by: INTERNAL MEDICINE

## 2022-03-14 PROCEDURE — 1159F MED LIST DOCD IN RCRD: CPT | Mod: CPTII,S$GLB,, | Performed by: INTERNAL MEDICINE

## 2022-03-14 PROCEDURE — 3074F PR MOST RECENT SYSTOLIC BLOOD PRESSURE < 130 MM HG: ICD-10-PCS | Mod: CPTII,S$GLB,, | Performed by: INTERNAL MEDICINE

## 2022-03-14 PROCEDURE — 1126F PR PAIN SEVERITY QUANTIFIED, NO PAIN PRESENT: ICD-10-PCS | Mod: CPTII,S$GLB,, | Performed by: INTERNAL MEDICINE

## 2022-03-14 PROCEDURE — 3078F DIAST BP <80 MM HG: CPT | Mod: CPTII,S$GLB,, | Performed by: INTERNAL MEDICINE

## 2022-03-14 PROCEDURE — 3074F SYST BP LT 130 MM HG: CPT | Mod: CPTII,S$GLB,, | Performed by: INTERNAL MEDICINE

## 2022-04-09 ENCOUNTER — CLINICAL SUPPORT (OUTPATIENT)
Dept: CARDIOLOGY | Facility: HOSPITAL | Age: 67
End: 2022-04-09
Payer: COMMERCIAL

## 2022-04-09 DIAGNOSIS — Z95.818 PRESENCE OF OTHER CARDIAC IMPLANTS AND GRAFTS: ICD-10-CM

## 2022-04-09 PROCEDURE — G2066 INTER DEVC REMOTE 30D: HCPCS | Performed by: INTERNAL MEDICINE

## 2022-04-19 ENCOUNTER — OFFICE VISIT (OUTPATIENT)
Dept: CARDIOLOGY | Facility: CLINIC | Age: 67
End: 2022-04-19
Payer: COMMERCIAL

## 2022-04-19 VITALS
BODY MASS INDEX: 26.51 KG/M2 | HEART RATE: 67 BPM | DIASTOLIC BLOOD PRESSURE: 67 MMHG | WEIGHT: 195.75 LBS | HEIGHT: 72 IN | SYSTOLIC BLOOD PRESSURE: 111 MMHG

## 2022-04-19 DIAGNOSIS — I49.3 PREMATURE VENTRICULAR CONTRACTIONS (PVCS) (VPCS): Primary | ICD-10-CM

## 2022-04-19 DIAGNOSIS — Z86.79 HISTORY OF CARDIOMYOPATHY: ICD-10-CM

## 2022-04-19 DIAGNOSIS — I47.10 SVT (SUPRAVENTRICULAR TACHYCARDIA): ICD-10-CM

## 2022-04-19 DIAGNOSIS — I48.0 PAF (PAROXYSMAL ATRIAL FIBRILLATION): ICD-10-CM

## 2022-04-19 DIAGNOSIS — E78.00 HYPERCHOLESTEREMIA: ICD-10-CM

## 2022-04-19 PROCEDURE — 3008F PR BODY MASS INDEX (BMI) DOCUMENTED: ICD-10-PCS | Mod: CPTII,S$GLB,, | Performed by: INTERNAL MEDICINE

## 2022-04-19 PROCEDURE — 1160F PR REVIEW ALL MEDS BY PRESCRIBER/CLIN PHARMACIST DOCUMENTED: ICD-10-PCS | Mod: CPTII,S$GLB,, | Performed by: INTERNAL MEDICINE

## 2022-04-19 PROCEDURE — 99214 PR OFFICE/OUTPT VISIT, EST, LEVL IV, 30-39 MIN: ICD-10-PCS | Mod: S$GLB,,, | Performed by: INTERNAL MEDICINE

## 2022-04-19 PROCEDURE — 1126F PR PAIN SEVERITY QUANTIFIED, NO PAIN PRESENT: ICD-10-PCS | Mod: CPTII,S$GLB,, | Performed by: INTERNAL MEDICINE

## 2022-04-19 PROCEDURE — 3074F PR MOST RECENT SYSTOLIC BLOOD PRESSURE < 130 MM HG: ICD-10-PCS | Mod: CPTII,S$GLB,, | Performed by: INTERNAL MEDICINE

## 2022-04-19 PROCEDURE — 99999 PR PBB SHADOW E&M-EST. PATIENT-LVL III: CPT | Mod: PBBFAC,,, | Performed by: INTERNAL MEDICINE

## 2022-04-19 PROCEDURE — 3008F BODY MASS INDEX DOCD: CPT | Mod: CPTII,S$GLB,, | Performed by: INTERNAL MEDICINE

## 2022-04-19 PROCEDURE — 1159F PR MEDICATION LIST DOCUMENTED IN MEDICAL RECORD: ICD-10-PCS | Mod: CPTII,S$GLB,, | Performed by: INTERNAL MEDICINE

## 2022-04-19 PROCEDURE — 99214 OFFICE O/P EST MOD 30 MIN: CPT | Mod: S$GLB,,, | Performed by: INTERNAL MEDICINE

## 2022-04-19 PROCEDURE — 99999 PR PBB SHADOW E&M-EST. PATIENT-LVL III: ICD-10-PCS | Mod: PBBFAC,,, | Performed by: INTERNAL MEDICINE

## 2022-04-19 PROCEDURE — 1160F RVW MEDS BY RX/DR IN RCRD: CPT | Mod: CPTII,S$GLB,, | Performed by: INTERNAL MEDICINE

## 2022-04-19 PROCEDURE — 3078F DIAST BP <80 MM HG: CPT | Mod: CPTII,S$GLB,, | Performed by: INTERNAL MEDICINE

## 2022-04-19 PROCEDURE — 3078F PR MOST RECENT DIASTOLIC BLOOD PRESSURE < 80 MM HG: ICD-10-PCS | Mod: CPTII,S$GLB,, | Performed by: INTERNAL MEDICINE

## 2022-04-19 PROCEDURE — 1126F AMNT PAIN NOTED NONE PRSNT: CPT | Mod: CPTII,S$GLB,, | Performed by: INTERNAL MEDICINE

## 2022-04-19 PROCEDURE — 1159F MED LIST DOCD IN RCRD: CPT | Mod: CPTII,S$GLB,, | Performed by: INTERNAL MEDICINE

## 2022-04-19 PROCEDURE — 3074F SYST BP LT 130 MM HG: CPT | Mod: CPTII,S$GLB,, | Performed by: INTERNAL MEDICINE

## 2022-04-19 RX ORDER — ATORVASTATIN CALCIUM 10 MG/1
10 TABLET, FILM COATED ORAL DAILY
Qty: 90 TABLET | Refills: 5 | Status: SHIPPED | OUTPATIENT
Start: 2022-04-19 | End: 2022-04-21 | Stop reason: SDUPTHER

## 2022-04-19 NOTE — PROGRESS NOTES
Subjective:    Patient ID:  Luke Borden is a 66 y.o. male who presents for follow-up of PAF      HPI  Here for follow up of PAF-RFA 10/18/PAC/PVC. Patients states is doing well no chest pain, SOB or change in exertional tolerence.   Patient is exercising regularly with out symptoms    Review of Systems   Constitutional: Negative for malaise/fatigue.   Eyes: Negative for blurred vision.   Cardiovascular: Negative for chest pain, claudication, cyanosis, dyspnea on exertion, irregular heartbeat, leg swelling, near-syncope, orthopnea, palpitations, paroxysmal nocturnal dyspnea and syncope.   Respiratory: Negative for cough and shortness of breath.    Hematologic/Lymphatic: Does not bruise/bleed easily.   Musculoskeletal: Negative for back pain, falls, joint pain, muscle cramps, muscle weakness and myalgias.   Gastrointestinal: Negative for abdominal pain, change in bowel habit, nausea and vomiting.   Genitourinary: Negative for urgency.   Neurological: Negative for dizziness, focal weakness and light-headedness.       Past Medical History:   Diagnosis Date    Anticoagulant long-term use     Asthma     Cardiomyopathy     Decreased cardiac ejection fraction     (30% 4/6/17)    Hypercholesteremia     JEANINE on CPAP     PAC (premature atrial contraction)      There are no hospital problems to display for this patient.       Objective:     Vitals:    04/19/22 1413   BP: 111/67   Pulse: 67        Physical Exam  Vitals and nursing note reviewed.   Constitutional:       Appearance: He is well-developed.   Neck:      Vascular: No JVD.   Cardiovascular:      Rate and Rhythm: Normal rate and regular rhythm.      Pulses: Intact distal pulses.           Carotid pulses are 2+ on the right side and 2+ on the left side.       Radial pulses are 2+ on the right side and 2+ on the left side.      Heart sounds: Normal heart sounds.   Pulmonary:      Effort: Pulmonary effort is normal.      Breath sounds: Normal breath sounds.    Musculoskeletal:         General: Normal range of motion.      Cervical back: Normal range of motion and neck supple.   Skin:     General: Skin is warm and dry.   Neurological:      Mental Status: He is alert and oriented to person, place, and time.   Psychiatric:         Speech: Speech normal.               ..    Chemistry        Component Value Date/Time     02/22/2022 0826    K 4.7 02/22/2022 0826     02/22/2022 0826    CO2 28 02/22/2022 0826    BUN 15 02/22/2022 0826    CREATININE 0.9 02/22/2022 0826    GLU 96 02/22/2022 0826        Component Value Date/Time    CALCIUM 9.4 02/22/2022 0826    ALKPHOS 70 02/22/2022 0826    AST 30 02/22/2022 0826    ALT 20 02/22/2022 0826    BILITOT 0.7 02/22/2022 0826    ESTGFRAFRICA >60.0 02/22/2022 0826    EGFRNONAA >60.0 02/22/2022 0826            ..  Lab Results   Component Value Date    CHOL 177 02/22/2022    CHOL 172 03/03/2021    CHOL 167 09/16/2019     Lab Results   Component Value Date    HDL 79 (H) 02/22/2022    HDL 79 (H) 03/03/2021    HDL 80 (H) 09/16/2019     Lab Results   Component Value Date    LDLCALC 86.6 02/22/2022    LDLCALC 77.4 03/03/2021    LDLCALC 75.0 09/16/2019     Lab Results   Component Value Date    TRIG 57 02/22/2022    TRIG 78 03/03/2021    TRIG 60 09/16/2019     Lab Results   Component Value Date    CHOLHDL 44.6 02/22/2022    CHOLHDL 45.9 03/03/2021    CHOLHDL 47.9 09/16/2019     ..  Lab Results   Component Value Date    WBC 4.45 02/22/2022    HGB 13.3 (L) 02/22/2022    HCT 40.7 02/22/2022     (H) 02/22/2022     02/22/2022       Test(s) Reviewed  I have reviewed the following in detail:  [] Stress test   [] Angiography   [x] Echocardiogram   [x] Labs   [x] Other:       Assessment:         ICD-10-CM ICD-9-CM   1. Premature ventricular contractions (PVCs) (VPCs)  I49.3 427.69   2. PAF (paroxysmal atrial fibrillation)  I48.0 427.31   3. SVT (supraventricular tachycardia)  I47.1 427.89   4. Hypercholesteremia  E78.00 272.0    5. History of cardiomyopathy  Z86.79 V12.59     Active Problem List with Overview Notes    Diagnosis Date Noted    Atrial tachycardia 10/31/2017    PAF (paroxysmal atrial fibrillation) 10/30/2017    Obstructive sleep apnea syndrome 09/11/2017    Premature ventricular contractions (PVCs) (VPCs) 05/08/2017    SVT (supraventricular tachycardia) 05/08/2017    History of cardiomyopathy 03/14/2017     10/2018 EF 60%  2/2017 EF 30 %      Overweight 02/03/2016    Vitamin D deficiency disease 08/08/2013    Fatigue 11/06/2012    Hypercholesteremia 12/30/2011    Benign prostatic hyperplasia 12/30/2011        Plan:           Return to clinic 9 months   Aerobic exercise 5x's/wk. Heart healthy diet and risk factor modification.    See labs and med orders.  Decrease lipitor to 5 mg follow liipd    Portions of this note may have been created with voice recognition software.  Grammatical, syntax and spelling errors may be inevitable.

## 2022-04-21 DIAGNOSIS — E78.00 HYPERCHOLESTEREMIA: Primary | ICD-10-CM

## 2022-04-21 NOTE — TELEPHONE ENCOUNTER
----- Message from Julian Hanna sent at 4/21/2022 11:16 AM CDT -----  Contact: pt at [phone]  Type: Needs Medical Advice  Who Called:  pt  Best Call Back Number: [phone]  Additional Information: pt is calling the office to speak with the nurse in regards to a Rx that was called into [facility] is [facility] but can that be changed to [facility]. Please call back and advise.

## 2022-04-22 RX ORDER — ATORVASTATIN CALCIUM 10 MG/1
10 TABLET, FILM COATED ORAL DAILY
Qty: 90 TABLET | Refills: 4 | Status: SHIPPED | OUTPATIENT
Start: 2022-04-22 | End: 2022-09-15

## 2022-04-27 ENCOUNTER — TELEPHONE (OUTPATIENT)
Dept: CARDIOLOGY | Facility: HOSPITAL | Age: 67
End: 2022-04-27
Payer: COMMERCIAL

## 2022-04-27 NOTE — TELEPHONE ENCOUNTER
Patient called into clinic to verify that home monitor was functioning and transmitting  Confirmed transmission received  Patient had questions regarding reports and explained abbreviations and noted false events  Patient VU

## 2022-05-09 ENCOUNTER — CLINICAL SUPPORT (OUTPATIENT)
Dept: CARDIOLOGY | Facility: HOSPITAL | Age: 67
End: 2022-05-09
Payer: COMMERCIAL

## 2022-05-09 DIAGNOSIS — Z95.818 PRESENCE OF OTHER CARDIAC IMPLANTS AND GRAFTS: ICD-10-CM

## 2022-05-09 PROCEDURE — 93298 REM INTERROG DEV EVAL SCRMS: CPT | Mod: ,,, | Performed by: INTERNAL MEDICINE

## 2022-05-09 PROCEDURE — G2066 INTER DEVC REMOTE 30D: HCPCS | Performed by: INTERNAL MEDICINE

## 2022-05-09 PROCEDURE — 93298 CARDIAC DEVICE CHECK - REMOTE: ICD-10-PCS | Mod: ,,, | Performed by: INTERNAL MEDICINE

## 2022-05-31 ENCOUNTER — PATIENT MESSAGE (OUTPATIENT)
Dept: ADMINISTRATIVE | Facility: HOSPITAL | Age: 67
End: 2022-05-31
Payer: COMMERCIAL

## 2022-06-08 ENCOUNTER — CLINICAL SUPPORT (OUTPATIENT)
Dept: CARDIOLOGY | Facility: HOSPITAL | Age: 67
End: 2022-06-08
Payer: MEDICARE

## 2022-06-08 DIAGNOSIS — Z95.818 PRESENCE OF OTHER CARDIAC IMPLANTS AND GRAFTS: ICD-10-CM

## 2022-06-08 PROCEDURE — G2066 INTER DEVC REMOTE 30D: HCPCS | Performed by: INTERNAL MEDICINE

## 2022-06-17 DIAGNOSIS — E78.00 HYPERCHOLESTEREMIA: Primary | ICD-10-CM

## 2022-07-08 ENCOUNTER — CLINICAL SUPPORT (OUTPATIENT)
Dept: CARDIOLOGY | Facility: HOSPITAL | Age: 67
End: 2022-07-08
Payer: MEDICARE

## 2022-07-08 DIAGNOSIS — Z95.818 PRESENCE OF OTHER CARDIAC IMPLANTS AND GRAFTS: ICD-10-CM

## 2022-07-08 PROCEDURE — 93298 CARDIAC DEVICE CHECK - REMOTE: ICD-10-PCS | Mod: ,,, | Performed by: INTERNAL MEDICINE

## 2022-07-08 PROCEDURE — 93298 REM INTERROG DEV EVAL SCRMS: CPT | Mod: ,,, | Performed by: INTERNAL MEDICINE

## 2022-08-05 ENCOUNTER — TELEPHONE (OUTPATIENT)
Dept: CARDIOLOGY | Facility: HOSPITAL | Age: 67
End: 2022-08-05
Payer: MEDICARE

## 2022-08-05 NOTE — TELEPHONE ENCOUNTER
Pt would like a call back re: reports from his loop recorder        He also stated he will be going to [place] for 3 weeks from the end of Sept to the middle of October and wanted to take his home monitor but didn't know if it would work there.  I called [employer] and they recommend pt to call them to get the exact location where they are going to be traveling to.  SARAH [phone] w/[employer] phone number.  Will send information through the pt portal as well

## 2022-08-05 NOTE — TELEPHONE ENCOUNTER
Returned patients call, no answer.  Left a voice message for him to return my call.  Left the number to the device clinic.   ----- Message from Dia Cool sent at 8/5/2022  9:55 AM CDT -----  Regarding: results on transmission  The pt is calling to get his results from his transmission. Please call him back @ [phone]. Thanks, Dia

## 2022-08-07 ENCOUNTER — CLINICAL SUPPORT (OUTPATIENT)
Dept: CARDIOLOGY | Facility: HOSPITAL | Age: 67
End: 2022-08-07
Payer: MEDICARE

## 2022-08-07 DIAGNOSIS — Z95.818 PRESENCE OF OTHER CARDIAC IMPLANTS AND GRAFTS: ICD-10-CM

## 2022-09-06 ENCOUNTER — CLINICAL SUPPORT (OUTPATIENT)
Dept: CARDIOLOGY | Facility: HOSPITAL | Age: 67
End: 2022-09-06
Payer: MEDICARE

## 2022-09-06 DIAGNOSIS — Z95.818 PRESENCE OF OTHER CARDIAC IMPLANTS AND GRAFTS: ICD-10-CM

## 2022-09-06 PROCEDURE — 93298 REM INTERROG DEV EVAL SCRMS: CPT | Mod: ,,, | Performed by: INTERNAL MEDICINE

## 2022-09-06 PROCEDURE — 93298 CARDIAC DEVICE CHECK - REMOTE: ICD-10-PCS | Mod: ,,, | Performed by: INTERNAL MEDICINE

## 2022-09-12 ENCOUNTER — LAB VISIT (OUTPATIENT)
Dept: LAB | Facility: HOSPITAL | Age: 67
End: 2022-09-12
Attending: INTERNAL MEDICINE
Payer: MEDICARE

## 2022-09-12 DIAGNOSIS — I49.3 PREMATURE VENTRICULAR CONTRACTIONS (PVCS) (VPCS): ICD-10-CM

## 2022-09-12 DIAGNOSIS — I48.0 PAF (PAROXYSMAL ATRIAL FIBRILLATION): ICD-10-CM

## 2022-09-12 DIAGNOSIS — E78.00 HYPERCHOLESTEREMIA: ICD-10-CM

## 2022-09-12 DIAGNOSIS — I47.10 SVT (SUPRAVENTRICULAR TACHYCARDIA): ICD-10-CM

## 2022-09-12 LAB
ALBUMIN SERPL BCP-MCNC: 3.7 G/DL (ref 3.5–5.2)
ALP SERPL-CCNC: 59 U/L (ref 55–135)
ALT SERPL W/O P-5'-P-CCNC: 15 U/L (ref 10–44)
ANION GAP SERPL CALC-SCNC: 4 MMOL/L (ref 8–16)
AST SERPL-CCNC: 23 U/L (ref 10–40)
BILIRUB SERPL-MCNC: 0.7 MG/DL (ref 0.1–1)
BUN SERPL-MCNC: 17 MG/DL (ref 8–23)
CALCIUM SERPL-MCNC: 8.9 MG/DL (ref 8.7–10.5)
CHLORIDE SERPL-SCNC: 107 MMOL/L (ref 95–110)
CHOLEST SERPL-MCNC: 185 MG/DL (ref 120–199)
CHOLEST/HDLC SERPL: 2.3 {RATIO} (ref 2–5)
CK SERPL-CCNC: 71 U/L (ref 20–200)
CO2 SERPL-SCNC: 27 MMOL/L (ref 23–29)
CREAT SERPL-MCNC: 0.8 MG/DL (ref 0.5–1.4)
EST. GFR  (NO RACE VARIABLE): >60 ML/MIN/1.73 M^2
GLUCOSE SERPL-MCNC: 100 MG/DL (ref 70–110)
HDLC SERPL-MCNC: 80 MG/DL (ref 40–75)
HDLC SERPL: 43.2 % (ref 20–50)
LDLC SERPL CALC-MCNC: 86.4 MG/DL (ref 63–159)
NONHDLC SERPL-MCNC: 105 MG/DL
POTASSIUM SERPL-SCNC: 4.2 MMOL/L (ref 3.5–5.1)
PROT SERPL-MCNC: 6.5 G/DL (ref 6–8.4)
SODIUM SERPL-SCNC: 138 MMOL/L (ref 136–145)
TRIGL SERPL-MCNC: 93 MG/DL (ref 30–150)

## 2022-09-12 PROCEDURE — 36415 COLL VENOUS BLD VENIPUNCTURE: CPT | Mod: PO | Performed by: INTERNAL MEDICINE

## 2022-09-12 PROCEDURE — 82550 ASSAY OF CK (CPK): CPT | Performed by: INTERNAL MEDICINE

## 2022-09-12 PROCEDURE — 80053 COMPREHEN METABOLIC PANEL: CPT | Performed by: INTERNAL MEDICINE

## 2022-09-12 PROCEDURE — 80061 LIPID PANEL: CPT | Performed by: INTERNAL MEDICINE

## 2022-09-14 ENCOUNTER — PATIENT MESSAGE (OUTPATIENT)
Dept: CARDIOLOGY | Facility: CLINIC | Age: 67
End: 2022-09-14
Payer: MEDICARE

## 2022-09-14 ENCOUNTER — TELEPHONE (OUTPATIENT)
Dept: CARDIOLOGY | Facility: CLINIC | Age: 67
End: 2022-09-14
Payer: MEDICARE

## 2022-09-14 NOTE — TELEPHONE ENCOUNTER
----- Message from Ambrose Pillai sent at 9/14/2022  3:29 PM CDT -----  Contact: pt at  [phone]  Type: Needs Medical Advice  Who Called:  pt  Best Call Back Number: [phone]  Additional Information: pt is calling the office requesting a call back from the nurse. Please call back and advise.

## 2022-09-15 ENCOUNTER — OFFICE VISIT (OUTPATIENT)
Dept: CARDIOLOGY | Facility: CLINIC | Age: 67
End: 2022-09-15
Payer: MEDICARE

## 2022-09-15 VITALS
HEIGHT: 72 IN | WEIGHT: 192.69 LBS | BODY MASS INDEX: 26.1 KG/M2 | HEART RATE: 81 BPM | DIASTOLIC BLOOD PRESSURE: 69 MMHG | SYSTOLIC BLOOD PRESSURE: 119 MMHG

## 2022-09-15 DIAGNOSIS — G47.33 OBSTRUCTIVE SLEEP APNEA SYNDROME: Primary | ICD-10-CM

## 2022-09-15 DIAGNOSIS — E78.00 HYPERCHOLESTEREMIA: ICD-10-CM

## 2022-09-15 PROCEDURE — 99214 PR OFFICE/OUTPT VISIT, EST, LEVL IV, 30-39 MIN: ICD-10-PCS | Mod: S$PBB,,, | Performed by: PHYSICIAN ASSISTANT

## 2022-09-15 PROCEDURE — 99999 PR PBB SHADOW E&M-EST. PATIENT-LVL III: ICD-10-PCS | Mod: PBBFAC,,, | Performed by: PHYSICIAN ASSISTANT

## 2022-09-15 PROCEDURE — 99213 OFFICE O/P EST LOW 20 MIN: CPT | Mod: PBBFAC,PO | Performed by: PHYSICIAN ASSISTANT

## 2022-09-15 PROCEDURE — 99999 PR PBB SHADOW E&M-EST. PATIENT-LVL III: CPT | Mod: PBBFAC,,, | Performed by: PHYSICIAN ASSISTANT

## 2022-09-15 PROCEDURE — 99214 OFFICE O/P EST MOD 30 MIN: CPT | Mod: S$PBB,,, | Performed by: PHYSICIAN ASSISTANT

## 2022-09-15 RX ORDER — ATORVASTATIN CALCIUM 10 MG/1
5 TABLET, FILM COATED ORAL DAILY
Qty: 90 TABLET | Refills: 4 | Status: SHIPPED | OUTPATIENT
Start: 2022-09-15 | End: 2023-08-31 | Stop reason: SDUPTHER

## 2022-09-15 NOTE — PROGRESS NOTES
Subjective:    Patient ID:  Luke Borden is a 66 y.o. male who presents for follow-up of HLD.       HPI  Mr. Borden is a delightful gentleman who follows with Dr. Coleman. He presents today for routine follow up. He is without cardiovascular complaints. At his last visit, his atorvastatin was decreased from 10mg to 5mg. Repeat lipids 3 days ago show HDL and LDL are essentially unchanged. He is walking routinely. They are headed to [facility] to walk the [employer] in 10 days.     Review of Systems   Constitutional: Negative for chills, diaphoresis, fever, weight gain and weight loss.   HENT:  Negative for sore throat.    Eyes:  Negative for blurred vision, vision loss in left eye, vision loss in right eye and visual disturbance.   Cardiovascular:  Negative for chest pain, claudication, dyspnea on exertion, leg swelling, near-syncope, orthopnea, palpitations, paroxysmal nocturnal dyspnea and syncope.   Respiratory:  Negative for cough, hemoptysis, shortness of breath, sputum production and wheezing.    Endocrine: Negative for cold intolerance and heat intolerance.   Hematologic/Lymphatic: Negative for adenopathy. Does not bruise/bleed easily.   Skin:  Negative for rash.   Musculoskeletal:  Negative for falls, muscle weakness and myalgias.   Gastrointestinal:  Negative for abdominal pain, change in bowel habit, constipation, diarrhea, melena and nausea.   Genitourinary:  Negative for bladder incontinence.   Neurological:  Negative for dizziness, focal weakness, headaches, light-headedness, numbness and weakness.   Psychiatric/Behavioral:  Negative for altered mental status.       Vitals:    09/15/22 1309   BP: 119/69   BP Location: Left arm   Patient Position: Sitting   BP Method: Medium (Automatic)   Pulse: 81   Weight: 87.4 kg (192 lb 10.9 oz)   Height: 6' (1.829 m)   Body mass index is 26.13 kg/m².    Objective:    Physical Exam  Constitutional:       Appearance: He is well-developed.   HENT:      Head: Normocephalic  and atraumatic.   Eyes:      Extraocular Movements: Extraocular movements intact.      Pupils: Pupils are equal, round, and reactive to light.   Neck:      Thyroid: No thyromegaly.      Vascular: No JVD.      Trachea: No tracheal deviation.   Cardiovascular:      Rate and Rhythm: Normal rate and regular rhythm.      Chest Wall: PMI is not displaced.      Pulses: Normal pulses and intact distal pulses.      Heart sounds: S1 normal and S2 normal. No murmur heard.    No friction rub. No gallop.   Pulmonary:      Effort: Pulmonary effort is normal. No respiratory distress.      Breath sounds: Normal breath sounds. No wheezing or rales.   Chest:      Chest wall: No tenderness.   Abdominal:      General: Bowel sounds are normal. There is no distension.      Palpations: Abdomen is soft. There is no mass.      Tenderness: There is no abdominal tenderness.   Musculoskeletal:         General: No tenderness. Normal range of motion.      Cervical back: Neck supple.   Skin:     General: Skin is warm and dry.      Findings: No rash.   Neurological:      General: No focal deficit present.      Mental Status: He is alert and oriented to person, place, and time.   Psychiatric:         Mood and Affect: Mood normal.         Behavior: Behavior normal.         Assessment:       Problem List Items Addressed This Visit          Cardiology Problems    Hypercholesteremia    Relevant Medications    atorvastatin (LIPITOR) 10 MG tablet          Plan:       Doing well.   Continue current cardiac medications.   Risk factor modification including diet and exercise.   F/U with Dr. Coleman in February.

## 2022-10-06 ENCOUNTER — CLINICAL SUPPORT (OUTPATIENT)
Dept: CARDIOLOGY | Facility: HOSPITAL | Age: 67
End: 2022-10-06
Payer: MEDICARE

## 2022-10-06 DIAGNOSIS — Z95.818 PRESENCE OF OTHER CARDIAC IMPLANTS AND GRAFTS: ICD-10-CM

## 2022-10-10 ENCOUNTER — TELEPHONE (OUTPATIENT)
Dept: CARDIOLOGY | Facility: HOSPITAL | Age: 67
End: 2022-10-10
Payer: MEDICARE

## 2022-10-10 NOTE — TELEPHONE ENCOUNTER
Pt called and stated he is traveling out of the country and wanted to know if his HM is transmitting.  I told him it sent 8/2/22.  Pt stated he will be out of the country for anther week.

## 2022-11-04 ENCOUNTER — TELEPHONE (OUTPATIENT)
Dept: CARDIOLOGY | Facility: HOSPITAL | Age: 67
End: 2022-11-04
Payer: MEDICARE

## 2022-11-04 NOTE — TELEPHONE ENCOUNTER
Spoke with patient and went over his remote ILR transmission with him.   ----- Message from Dia Cool sent at 11/4/2022  9:08 AM CDT -----  Regarding: question  The pt is calling about his transmission report. Please call him back @ [phone]. Thanks, Dia

## 2022-11-05 ENCOUNTER — CLINICAL SUPPORT (OUTPATIENT)
Dept: CARDIOLOGY | Facility: HOSPITAL | Age: 67
End: 2022-11-05
Payer: MEDICARE

## 2022-11-05 DIAGNOSIS — Z95.818 PRESENCE OF OTHER CARDIAC IMPLANTS AND GRAFTS: ICD-10-CM

## 2022-11-05 PROCEDURE — 93298 CARDIAC DEVICE CHECK - REMOTE: ICD-10-PCS | Mod: ,,, | Performed by: INTERNAL MEDICINE

## 2022-11-05 PROCEDURE — 93298 REM INTERROG DEV EVAL SCRMS: CPT | Mod: ,,, | Performed by: INTERNAL MEDICINE

## 2022-12-05 ENCOUNTER — CLINICAL SUPPORT (OUTPATIENT)
Dept: CARDIOLOGY | Facility: HOSPITAL | Age: 67
End: 2022-12-05
Payer: MEDICARE

## 2022-12-05 DIAGNOSIS — Z95.818 PRESENCE OF OTHER CARDIAC IMPLANTS AND GRAFTS: ICD-10-CM

## 2023-01-04 ENCOUNTER — CLINICAL SUPPORT (OUTPATIENT)
Dept: CARDIOLOGY | Facility: HOSPITAL | Age: 68
End: 2023-01-04
Attending: FAMILY MEDICINE
Payer: MEDICARE

## 2023-01-04 DIAGNOSIS — Z95.818 PRESENCE OF OTHER CARDIAC IMPLANTS AND GRAFTS: ICD-10-CM

## 2023-01-04 PROCEDURE — 93298 REM INTERROG DEV EVAL SCRMS: CPT | Mod: ,,, | Performed by: INTERNAL MEDICINE

## 2023-01-04 PROCEDURE — 93298 CARDIAC DEVICE CHECK - REMOTE: ICD-10-PCS | Mod: ,,, | Performed by: INTERNAL MEDICINE

## 2023-02-03 ENCOUNTER — CLINICAL SUPPORT (OUTPATIENT)
Dept: CARDIOLOGY | Facility: HOSPITAL | Age: 68
End: 2023-02-03
Payer: MEDICARE

## 2023-02-03 DIAGNOSIS — Z95.818 PRESENCE OF OTHER CARDIAC IMPLANTS AND GRAFTS: ICD-10-CM

## 2023-03-05 ENCOUNTER — CLINICAL SUPPORT (OUTPATIENT)
Dept: CARDIOLOGY | Facility: HOSPITAL | Age: 68
End: 2023-03-05
Payer: MEDICARE

## 2023-03-05 DIAGNOSIS — Z95.818 PRESENCE OF OTHER CARDIAC IMPLANTS AND GRAFTS: ICD-10-CM

## 2023-03-05 PROCEDURE — 93298 CARDIAC DEVICE CHECK - REMOTE: ICD-10-PCS | Mod: ,,, | Performed by: INTERNAL MEDICINE

## 2023-03-05 PROCEDURE — 93298 REM INTERROG DEV EVAL SCRMS: CPT | Mod: ,,, | Performed by: INTERNAL MEDICINE

## 2023-04-04 ENCOUNTER — CLINICAL SUPPORT (OUTPATIENT)
Dept: CARDIOLOGY | Facility: HOSPITAL | Age: 68
End: 2023-04-04
Payer: MEDICARE

## 2023-04-04 DIAGNOSIS — Z95.818 PRESENCE OF OTHER CARDIAC IMPLANTS AND GRAFTS: ICD-10-CM

## 2023-04-28 NOTE — PROGRESS NOTES
"Subjective:   Patient ID:  Luke Borden is a 67 y.o. male who presents for follow-up of PAF (Annual f/u )      HPI yo male with NICM (rate related, resolved), PAC's, PVC's, nonsustained atrial tachycardia, atrial fibrillation, Sleep apnea (on CPAP).     Background:  Primary Cardiologist is Dr. Coleman.  Retired, [employer] .  Presented to PCP for routine visit, noted to have irregular heart beat.  Work-up revealed ectopy and cardiomyopathy.  Echo 2/16/17 EF 30-35%  Holter 2/17/17 2004 PVC's, frequent PAC's and "frequent runs of AT/AF."  Placed on beta blocker and ace inhibitor.  Echo 4/6/17 EF 30% LVEDD 6.2 cm, ALEXIS 28, moderate TR.  48 holter 4/6/17 1605 PVC's, 2431 PAC's frequent 3-20 beat runs of atrial tachycardia.  LHC 4/24/17 normal coronary arteries.  Placed on Sotalol >> Continued to have frequent ectopy.  Holter 8/3/17 2065 PAC's with nearly incessant runs in nonsustained atrial tachycardia, 5492 PVC's  Echo 9/22/17 EF 35-40%     PVI (RFA) 10/31/17.  Has noted symptomatic benefit since then.  Holter 2/26/18 NSR with 23 PVC's, 31 PAC's, no AT/AF  Echo 2/26/18 EF 40-  We discontinued Sotalol and initiated Toprol.  Echo 10/29/19 normal biventricular structure and function ALEXIS 35  48 hr holter 10/29/18 nsr with 158 PAC's + 1 4 beat run of nonsustained AT     ILR implanted 11/21/19. Reveals no significant arrhythmias to date.  Echo 2/22/22 EF 55% moderate LAE     Update:    Feeling well. Walked [place] including 17.5 miles a in one day.  Device interrogation reveals no AF.      Review of Systems   Constitutional: Negative. Negative for fever and malaise/fatigue.   HENT:  Negative for congestion and sore throat.    Cardiovascular:  Negative for chest pain, dyspnea on exertion, irregular heartbeat, leg swelling, near-syncope, orthopnea, palpitations, paroxysmal nocturnal dyspnea and syncope.   Respiratory:  Negative for cough and shortness of breath.    Gastrointestinal:  Negative " for abdominal pain, constipation and diarrhea.   Neurological:  Negative for dizziness, light-headedness and weakness.   Psychiatric/Behavioral:  Negative for depression. The patient is not nervous/anxious.    All other systems reviewed and are negative.    Objective:   Physical Exam  Constitutional:       Appearance: He is well-developed.   Eyes:      General: No scleral icterus.     Conjunctiva/sclera: Conjunctivae normal.   Neck:      Vascular: No JVD.      Trachea: No tracheal deviation.   Cardiovascular:      Rate and Rhythm: Normal rate and regular rhythm.      Chest Wall: PMI is not displaced.      Heart sounds: Normal heart sounds.   Pulmonary:      Effort: Pulmonary effort is normal. No respiratory distress.      Breath sounds: Normal breath sounds.   Abdominal:      Palpations: Abdomen is soft.      Tenderness: There is no abdominal tenderness.   Musculoskeletal:         General: No tenderness.   Skin:     General: Skin is warm and dry.      Findings: No rash.   Neurological:      Mental Status: He is alert and oriented to person, place, and time.   Psychiatric:         Behavior: Behavior normal.       Assessment:      1. PAF (paroxysmal atrial fibrillation)    2. History of cardiomyopathy    3. Fatigue, unspecified type        Plan:     Doing great.  We discussed that he is nearing NOE on his ILR. I would strongly endorse replacement at that time, given his history of tachy-arrhythmia induced cardiomyopathy.  Continue with monitoring as scheduled, with me in one year.

## 2023-05-01 ENCOUNTER — OFFICE VISIT (OUTPATIENT)
Dept: CARDIOLOGY | Facility: CLINIC | Age: 68
End: 2023-05-01
Payer: MEDICARE

## 2023-05-01 VITALS
SYSTOLIC BLOOD PRESSURE: 115 MMHG | WEIGHT: 197.31 LBS | HEART RATE: 76 BPM | DIASTOLIC BLOOD PRESSURE: 71 MMHG | BODY MASS INDEX: 26.73 KG/M2 | HEIGHT: 72 IN

## 2023-05-01 DIAGNOSIS — I48.0 PAF (PAROXYSMAL ATRIAL FIBRILLATION): Primary | ICD-10-CM

## 2023-05-01 DIAGNOSIS — Z86.79 HISTORY OF CARDIOMYOPATHY: ICD-10-CM

## 2023-05-01 DIAGNOSIS — R53.83 FATIGUE, UNSPECIFIED TYPE: Chronic | ICD-10-CM

## 2023-05-01 PROCEDURE — 99999 PR PBB SHADOW E&M-EST. PATIENT-LVL III: ICD-10-PCS | Mod: PBBFAC,,, | Performed by: INTERNAL MEDICINE

## 2023-05-01 PROCEDURE — 99213 OFFICE O/P EST LOW 20 MIN: CPT | Mod: PBBFAC,PO | Performed by: INTERNAL MEDICINE

## 2023-05-01 PROCEDURE — 99215 PR OFFICE/OUTPT VISIT, EST, LEVL V, 40-54 MIN: ICD-10-PCS | Mod: S$PBB,,, | Performed by: INTERNAL MEDICINE

## 2023-05-01 PROCEDURE — 99215 OFFICE O/P EST HI 40 MIN: CPT | Mod: S$PBB,,, | Performed by: INTERNAL MEDICINE

## 2023-05-01 PROCEDURE — 99999 PR PBB SHADOW E&M-EST. PATIENT-LVL III: CPT | Mod: PBBFAC,,, | Performed by: INTERNAL MEDICINE

## 2023-05-04 ENCOUNTER — CLINICAL SUPPORT (OUTPATIENT)
Dept: CARDIOLOGY | Facility: HOSPITAL | Age: 68
End: 2023-05-04
Payer: MEDICARE

## 2023-05-04 DIAGNOSIS — Z95.818 PRESENCE OF OTHER CARDIAC IMPLANTS AND GRAFTS: ICD-10-CM

## 2023-05-04 PROCEDURE — 93298 REM INTERROG DEV EVAL SCRMS: CPT | Mod: ,,, | Performed by: INTERNAL MEDICINE

## 2023-05-04 PROCEDURE — 93298 CARDIAC DEVICE CHECK - REMOTE: ICD-10-PCS | Mod: ,,, | Performed by: INTERNAL MEDICINE

## 2023-06-03 ENCOUNTER — CLINICAL SUPPORT (OUTPATIENT)
Dept: CARDIOLOGY | Facility: HOSPITAL | Age: 68
End: 2023-06-03
Payer: MEDICARE

## 2023-06-03 DIAGNOSIS — Z95.818 PRESENCE OF OTHER CARDIAC IMPLANTS AND GRAFTS: ICD-10-CM

## 2023-07-03 ENCOUNTER — CLINICAL SUPPORT (OUTPATIENT)
Dept: CARDIOLOGY | Facility: HOSPITAL | Age: 68
End: 2023-07-03
Payer: MEDICARE

## 2023-07-03 DIAGNOSIS — Z95.818 PRESENCE OF OTHER CARDIAC IMPLANTS AND GRAFTS: ICD-10-CM

## 2023-07-03 PROCEDURE — 93298 CARDIAC DEVICE CHECK - REMOTE: ICD-10-PCS | Mod: ,,, | Performed by: INTERNAL MEDICINE

## 2023-07-03 PROCEDURE — 93298 REM INTERROG DEV EVAL SCRMS: CPT | Mod: ,,, | Performed by: INTERNAL MEDICINE

## 2023-07-13 ENCOUNTER — TELEPHONE (OUTPATIENT)
Dept: ELECTROPHYSIOLOGY | Facility: CLINIC | Age: 68
End: 2023-07-13
Payer: MEDICARE

## 2023-07-13 NOTE — TELEPHONE ENCOUNTER
"ILR implanted for AF management. [place] alert received for ILR at [facility] since 7/12/23. Last OV note (5/1/23) states: "We discussed that he is nearing NOE on his ILR. I would strongly endorse replacement at that time, given his history of tachy-arrhythmia induced cardiomyopathy."      "

## 2023-07-20 ENCOUNTER — TELEPHONE (OUTPATIENT)
Dept: ELECTROPHYSIOLOGY | Facility: CLINIC | Age: 68
End: 2023-07-20
Payer: MEDICARE

## 2023-07-20 NOTE — TELEPHONE ENCOUNTER
Returned the pt's call on this afternoon. Pt was inquiring about the report stating PACs.  Informed the pt that the PACs are seen on the presenting rhythm which a snap shot per say, of his HR at the time of the recording.  Also informed the pt that the ILR does not collect data to be able to see how often they are occurring.  Pt then asked about the ILR @ [facility].  Informed the pt that currently the ILR is still recording information and that Dr. Hamilton and his nurse have been notified and the he should receive a call from her some time next week to discuss replacing the ILR.  Understanding was verbalized.  Pt appreciated the call.

## 2023-07-20 NOTE — TELEPHONE ENCOUNTER
----- Message from Dia Cool sent at 7/20/2023  1:14 PM CDT -----  Regarding: transmission report  question  The pt is calling to ask a transmission report question. Please call him back @ [phone]. Thanks, Dia

## 2023-08-01 ENCOUNTER — TELEPHONE (OUTPATIENT)
Dept: CARDIOLOGY | Facility: HOSPITAL | Age: 68
End: 2023-08-01
Payer: MEDICARE

## 2023-08-01 ENCOUNTER — TELEPHONE (OUTPATIENT)
Dept: ELECTROPHYSIOLOGY | Facility: CLINIC | Age: 68
End: 2023-08-01
Payer: MEDICARE

## 2023-08-01 ENCOUNTER — PATIENT MESSAGE (OUTPATIENT)
Dept: ELECTROPHYSIOLOGY | Facility: CLINIC | Age: 68
End: 2023-08-01
Payer: MEDICARE

## 2023-08-01 DIAGNOSIS — Z45.09 ENCOUNTER FOR LOOP RECORDER AT END OF BATTERY LIFE: ICD-10-CM

## 2023-08-01 DIAGNOSIS — I47.10 SVT (SUPRAVENTRICULAR TACHYCARDIA): ICD-10-CM

## 2023-08-01 DIAGNOSIS — I47.19 ATRIAL TACHYCARDIA: Primary | Chronic | ICD-10-CM

## 2023-08-01 DIAGNOSIS — I48.0 PAF (PAROXYSMAL ATRIAL FIBRILLATION): ICD-10-CM

## 2023-08-01 NOTE — TELEPHONE ENCOUNTER
----- Message from Amanda CRYSTAL Lawrence sent at 8/1/2023  1:44 PM CDT -----  Patient is calling about his device change out.  He stated he would like a call back about it being scheduled today.  Please see number below.  Thank you.  ----- Message -----  From: Sybil Huber MA  Sent: 8/1/2023  12:51 PM CDT  To: [facility] Arrhythmia Device Staff    The patient would like  to talk to Joseluis about his device please call [phone]. Thank you.

## 2023-08-01 NOTE — TELEPHONE ENCOUNTER
Pt called and wanted to know if there was any news on when he would be scheduled for his loop recorder replacement.  He didn't want to bother anyone.  Informed him it is no bother at all.  Pt is trying to plan a trip and would like to know so he can plan around it.  Informed him will send message to staff to see if they can update him on a date and time.  Pt verbalized gratitude.

## 2023-08-01 NOTE — TELEPHONE ENCOUNTER
I spoke with patient and scheduled his procedure (ILR removal and reimplant) for 9/5/2023. Procedure details reviewed and instructions will be sent via patient portal as requested.

## 2023-08-02 ENCOUNTER — CLINICAL SUPPORT (OUTPATIENT)
Dept: CARDIOLOGY | Facility: HOSPITAL | Age: 68
End: 2023-08-02
Payer: MEDICARE

## 2023-08-02 DIAGNOSIS — Z95.818 PRESENCE OF OTHER CARDIAC IMPLANTS AND GRAFTS: ICD-10-CM

## 2023-08-25 ENCOUNTER — TELEPHONE (OUTPATIENT)
Dept: ELECTROPHYSIOLOGY | Facility: CLINIC | Age: 68
End: 2023-08-25
Payer: MEDICARE

## 2023-08-25 ENCOUNTER — TELEPHONE (OUTPATIENT)
Dept: CARDIOLOGY | Facility: HOSPITAL | Age: 68
End: 2023-08-25
Payer: MEDICARE

## 2023-08-25 NOTE — TELEPHONE ENCOUNTER
Returned call to pt. Advised that his procedure has been approved by his insurance and if he has any out of pocket expense pre services would reach out prior to his procedure.      ----- Message from Ayana Nash RN sent at 8/25/2023  3:01 PM CDT -----  Regarding: FW: please call    ----- Message -----  From: Amanda Lawrence  Sent: 8/25/2023   2:30 PM CDT  To: Zeinab Cummins RN  Subject: FW: please call                                  I went over patients ILR results with him and he stated understanding.  Patient is requesting information about insurance coverage for his ILR removal and replacement.  He can be reached at [phone].  ----- Message -----  From: Dia Cool  Sent: 8/25/2023  10:50 AM CDT  To: [facility] Arrhythmia Device Staff  Subject: please call                                      The pt is calling to ask a few questions about his report he just seen. Please call him back @ [phone]. Thanks, Dia

## 2023-08-25 NOTE — TELEPHONE ENCOUNTER
Spoke with patient and went over his device report with him.    ----- Message from Dia Cool sent at 8/25/2023 10:49 AM CDT -----  Regarding: please call  The pt is calling to ask a few questions about his report he just seen. Please call him back @ [phone]. Thanks, Dia

## 2023-08-30 ENCOUNTER — LAB VISIT (OUTPATIENT)
Dept: LAB | Facility: HOSPITAL | Age: 68
End: 2023-08-30
Attending: INTERNAL MEDICINE
Payer: MEDICARE

## 2023-08-30 DIAGNOSIS — I47.10 SVT (SUPRAVENTRICULAR TACHYCARDIA): ICD-10-CM

## 2023-08-30 DIAGNOSIS — I48.0 PAF (PAROXYSMAL ATRIAL FIBRILLATION): ICD-10-CM

## 2023-08-30 DIAGNOSIS — Z45.09 ENCOUNTER FOR LOOP RECORDER AT END OF BATTERY LIFE: ICD-10-CM

## 2023-08-30 DIAGNOSIS — I47.19 ATRIAL TACHYCARDIA: Chronic | ICD-10-CM

## 2023-08-30 LAB
ANION GAP SERPL CALC-SCNC: 9 MMOL/L (ref 8–16)
APTT PPP: 27 SEC (ref 21–32)
BASOPHILS # BLD AUTO: 0.06 K/UL (ref 0–0.2)
BASOPHILS NFR BLD: 1 % (ref 0–1.9)
BUN SERPL-MCNC: 18 MG/DL (ref 8–23)
CALCIUM SERPL-MCNC: 9.3 MG/DL (ref 8.7–10.5)
CHLORIDE SERPL-SCNC: 104 MMOL/L (ref 95–110)
CO2 SERPL-SCNC: 28 MMOL/L (ref 23–29)
CREAT SERPL-MCNC: 0.9 MG/DL (ref 0.5–1.4)
DIFFERENTIAL METHOD: ABNORMAL
EOSINOPHIL # BLD AUTO: 0.2 K/UL (ref 0–0.5)
EOSINOPHIL NFR BLD: 3.9 % (ref 0–8)
ERYTHROCYTE [DISTWIDTH] IN BLOOD BY AUTOMATED COUNT: 13.4 % (ref 11.5–14.5)
EST. GFR  (NO RACE VARIABLE): >60 ML/MIN/1.73 M^2
GLUCOSE SERPL-MCNC: 91 MG/DL (ref 70–110)
HCT VFR BLD AUTO: 39.5 % (ref 40–54)
HGB BLD-MCNC: 13 G/DL (ref 14–18)
IMM GRANULOCYTES # BLD AUTO: 0.01 K/UL (ref 0–0.04)
IMM GRANULOCYTES NFR BLD AUTO: 0.2 % (ref 0–0.5)
INR PPP: 1 (ref 0.8–1.2)
LYMPHOCYTES # BLD AUTO: 0.8 K/UL (ref 1–4.8)
LYMPHOCYTES NFR BLD: 13.7 % (ref 18–48)
MCH RBC QN AUTO: 32.9 PG (ref 27–31)
MCHC RBC AUTO-ENTMCNC: 32.9 G/DL (ref 32–36)
MCV RBC AUTO: 100 FL (ref 82–98)
MONOCYTES # BLD AUTO: 0.7 K/UL (ref 0.3–1)
MONOCYTES NFR BLD: 12.1 % (ref 4–15)
NEUTROPHILS # BLD AUTO: 4.1 K/UL (ref 1.8–7.7)
NEUTROPHILS NFR BLD: 69.1 % (ref 38–73)
NRBC BLD-RTO: 0 /100 WBC
PLATELET # BLD AUTO: 237 K/UL (ref 150–450)
PMV BLD AUTO: 10.7 FL (ref 9.2–12.9)
POTASSIUM SERPL-SCNC: 4.4 MMOL/L (ref 3.5–5.1)
PROTHROMBIN TIME: 10.3 SEC (ref 9–12.5)
RBC # BLD AUTO: 3.95 M/UL (ref 4.6–6.2)
SODIUM SERPL-SCNC: 141 MMOL/L (ref 136–145)
WBC # BLD AUTO: 5.97 K/UL (ref 3.9–12.7)

## 2023-08-30 PROCEDURE — 85730 THROMBOPLASTIN TIME PARTIAL: CPT | Mod: PO | Performed by: INTERNAL MEDICINE

## 2023-08-30 PROCEDURE — 80048 BASIC METABOLIC PNL TOTAL CA: CPT | Performed by: INTERNAL MEDICINE

## 2023-08-30 PROCEDURE — 85610 PROTHROMBIN TIME: CPT | Mod: PO | Performed by: INTERNAL MEDICINE

## 2023-08-30 PROCEDURE — 85025 COMPLETE CBC W/AUTO DIFF WBC: CPT | Performed by: INTERNAL MEDICINE

## 2023-08-30 PROCEDURE — 36415 COLL VENOUS BLD VENIPUNCTURE: CPT | Mod: PO | Performed by: INTERNAL MEDICINE

## 2023-09-01 ENCOUNTER — CLINICAL SUPPORT (OUTPATIENT)
Dept: CARDIOLOGY | Facility: HOSPITAL | Age: 68
End: 2023-09-01
Payer: MEDICARE

## 2023-09-01 ENCOUNTER — TELEPHONE (OUTPATIENT)
Dept: ELECTROPHYSIOLOGY | Facility: CLINIC | Age: 68
End: 2023-09-01

## 2023-09-01 DIAGNOSIS — Z95.818 PRESENCE OF OTHER CARDIAC IMPLANTS AND GRAFTS: ICD-10-CM

## 2023-09-01 PROCEDURE — 93298 CARDIAC DEVICE CHECK - REMOTE: ICD-10-PCS | Mod: ,,, | Performed by: INTERNAL MEDICINE

## 2023-09-01 PROCEDURE — 93298 REM INTERROG DEV EVAL SCRMS: CPT | Mod: ,,, | Performed by: INTERNAL MEDICINE

## 2023-09-01 NOTE — TELEPHONE ENCOUNTER
Spoke to patient    CONFIRMED procedure arrival time of 8:30am for ILR remove and replace with Dr Hamilton on 9/5/2023    Reiterated instructions including:  -Directions to check in desk  -NPO after midnight night prior to procedure  -Pre-procedure LABS reviewed, no alerts noted  -Confirmed no fever, cough, or shortness of breath in the past 30 days  -Bathe night prior and morning prior to procedure with Hibiclens solution or an antibacterial soap      Patient verbalized understanding of above and appreciated the call.

## 2023-09-05 ENCOUNTER — HOSPITAL ENCOUNTER (OUTPATIENT)
Facility: HOSPITAL | Age: 68
Discharge: HOME | End: 2023-09-05
Attending: INTERNAL MEDICINE | Admitting: INTERNAL MEDICINE
Payer: MEDICARE

## 2023-09-05 ENCOUNTER — TELEPHONE (OUTPATIENT)
Dept: ELECTROPHYSIOLOGY | Facility: CLINIC | Age: 68
End: 2023-09-05
Payer: MEDICARE

## 2023-09-05 VITALS
TEMPERATURE: 99 F | DIASTOLIC BLOOD PRESSURE: 67 MMHG | HEART RATE: 66 BPM | BODY MASS INDEX: 26.14 KG/M2 | WEIGHT: 193 LBS | OXYGEN SATURATION: 98 % | RESPIRATION RATE: 14 BRPM | SYSTOLIC BLOOD PRESSURE: 141 MMHG | HEIGHT: 72 IN

## 2023-09-05 DIAGNOSIS — Z45.09 ENCOUNTER FOR LOOP RECORDER AT END OF BATTERY LIFE: Primary | ICD-10-CM

## 2023-09-05 DIAGNOSIS — I48.0 PAF (PAROXYSMAL ATRIAL FIBRILLATION): ICD-10-CM

## 2023-09-05 DIAGNOSIS — I47.19 ATRIAL TACHYCARDIA: ICD-10-CM

## 2023-09-05 DIAGNOSIS — Z95.9 CARDIAC DEVICE IN SITU: ICD-10-CM

## 2023-09-05 DIAGNOSIS — I47.10 SVT (SUPRAVENTRICULAR TACHYCARDIA): ICD-10-CM

## 2023-09-05 PROCEDURE — 25000003 PHARM REV CODE 250: Performed by: INTERNAL MEDICINE

## 2023-09-05 PROCEDURE — 63600175 PHARM REV CODE 636 W HCPCS: Performed by: INTERNAL MEDICINE

## 2023-09-05 PROCEDURE — 33286 RMVL SUBQ CAR RHYTHM MNTR: CPT | Mod: 51,,, | Performed by: INTERNAL MEDICINE

## 2023-09-05 PROCEDURE — 93005 ELECTROCARDIOGRAM TRACING: CPT

## 2023-09-05 PROCEDURE — C1764 EVENT RECORDER, CARDIAC: HCPCS | Performed by: INTERNAL MEDICINE

## 2023-09-05 PROCEDURE — 93010 EKG 12-LEAD: ICD-10-PCS | Mod: ,,, | Performed by: INTERNAL MEDICINE

## 2023-09-05 PROCEDURE — 33285 INSJ SUBQ CAR RHYTHM MNTR: CPT | Mod: 59 | Performed by: INTERNAL MEDICINE

## 2023-09-05 PROCEDURE — 33286 RMVL SUBQ CAR RHYTHM MNTR: CPT | Performed by: INTERNAL MEDICINE

## 2023-09-05 PROCEDURE — 33286 PR REMOVAL, SUBQ CARDIAC RHYTHM MONITOR: ICD-10-PCS | Mod: 51,,, | Performed by: INTERNAL MEDICINE

## 2023-09-05 PROCEDURE — 93010 ELECTROCARDIOGRAM REPORT: CPT | Mod: ,,, | Performed by: INTERNAL MEDICINE

## 2023-09-05 PROCEDURE — 33285 INSJ SUBQ CAR RHYTHM MNTR: CPT | Mod: 59,,, | Performed by: INTERNAL MEDICINE

## 2023-09-05 PROCEDURE — 33285 PR INSERTION,SUBQ CARDIAC RHYTHM MONITOR, W/PRGRMG: ICD-10-PCS | Mod: 59,,, | Performed by: INTERNAL MEDICINE

## 2023-09-05 DEVICE — LUX-DX™ INSERTABLE CARDIAC MONITOR
Type: IMPLANTABLE DEVICE | Site: CHEST | Status: FUNCTIONAL
Brand: LUX-DX™ INSERTABLE CARDIAC MONITOR

## 2023-09-05 RX ORDER — CEFAZOLIN SODIUM 1 G/3ML
INJECTION, POWDER, FOR SOLUTION INTRAMUSCULAR; INTRAVENOUS
Status: DISCONTINUED | OUTPATIENT
Start: 2023-09-05 | End: 2023-09-05 | Stop reason: HOSPADM

## 2023-09-05 RX ORDER — LIDOCAINE HYDROCHLORIDE AND EPINEPHRINE 10; 10 MG/ML; UG/ML
INJECTION, SOLUTION INFILTRATION; PERINEURAL
Status: DISCONTINUED | OUTPATIENT
Start: 2023-09-05 | End: 2023-09-05 | Stop reason: HOSPADM

## 2023-09-05 NOTE — H&P
"[facility] - Short Stay Cardiac [facility]  Cardiac Electrophysiology  History and Physical     Admission Date: 9/5/2023  Code Status: Prior   Attending Provider: Shawn Hamilton MD   Principal Problem:PAF (paroxysmal atrial fibrillation)    Subjective:   Chief Complaint:  PAF     HPI: Mr. Borden is a 67 year old male with NICM (rate related, resolved), PAC's, PVC's, nonsustained atrial tachycardia, atrial fibrillation, Sleep apnea (on CPAP), ILR implanted for AF management.     History obtained through patient report and notes:    Primary Cardiologist is Dr. Coleman.  Retired, [employer] .  Presented to PCP for routine visit, noted to have irregular heart beat.  Work-up revealed ectopy and cardiomyopathy.  Echo 2/16/17 EF 30-35%  Holter 2/17/17 2004 PVC's, frequent PAC's and "frequent runs of AT/AF."  Placed on beta blocker and ace inhibitor.  Echo 4/6/17 EF 30% LVEDD 6.2 cm, ALEXIS 28, moderate TR.  48 holter 4/6/17 1605 PVC's, 2431 PAC's frequent 3-20 beat runs of atrial tachycardia.  C 4/24/17 normal coronary arteries.  Placed on Sotalol >> Continued to have frequent ectopy.  Holter 8/3/17 2065 PAC's with nearly incessant runs in nonsustained atrial tachycardia, 5492 PVC's  Echo 9/22/17 EF 35-40%     PVI (RFA) 10/31/17.  Has noted symptomatic benefit since then.  Holter 2/26/18 NSR with 23 PVC's, 31 PAC's, no AT/AF  Echo 2/26/18 EF 40-  We discontinued Sotalol and initiated Toprol.  Echo 10/29/19 normal biventricular structure and function ALEXIS 35  48 hr holter 10/29/18 nsr with 158 PAC's + 1 4 beat run of nonsustained AT     ILR implanted 11/21/19. Reveals no significant arrhythmias to date.  Echo 2/22/22 EF 55% moderate LAE     5/1/23 During clinic visit with Dr. Hamilton, he was feeling well. Walked [place] including 17.5 miles a in one day.  Device interrogation reveals no AF  Doing great.  We discussed that he is nearing NOE on his ILR. I would strongly endorse replacement at that time, " given his history of tachy-arrhythmia induced cardiomyopathy.  Continue with monitoring as scheduled, with me in one year.    7/13/23 [place] alert received for ILR at [facility] since 7/12/23.     Mr. Borden presents today to [facility] for scheduled ILR removal and re-implant with Dr. Hamilton. He denies any chest pain, palpitations, SOB, ALEXANDER, dizziness, light headedness, weakness, syncope, or near syncopal episodes. He denies any bleeding, infections, fevers, rashes, or surgeries in the past 30 days. He is currently not taking any OAC. Patient would like to proceed with just local anesthetic, and deferred conscious sedation.     ECG today shows sinus rhtyhm at 67 bpm  ms QRS 78 ms QT/Qtc 418/441 ms.    Past Medical History:   Diagnosis Date    Anticoagulant long-term use     Asthma     Cardiomyopathy     Decreased cardiac ejection fraction     (30% 4/6/17)    Hypercholesteremia     JEANINE on CPAP     PAC (premature atrial contraction)        Past Surgical History:   Procedure Laterality Date    CARDIAC CATHETERIZATION  04/2017    angiogram/[facility], [place]    INSERTION OF IMPLANTABLE LOOP RECORDER N/A 11/21/2019    Procedure: Insertion, Implantable Loop Recorder;  Surgeon: Shawn Hamilton MD;  Location: [facility] EP LAB;  Service: Cardiology;  Laterality: N/A;  MIO, ELIGIO, MDT, SK, 3 Prep    TONSILLECTOMY      VASECTOMY         Review of patient's allergies indicates:  No Known Allergies    No current facility-administered medications on file prior to encounter.     Current Outpatient Medications on File Prior to Encounter   Medication Sig    cholecalciferol, vitamin D3, (VITAMIN D3) 50 mcg (2,000 unit) Cap Take 1 capsule by mouth once daily.    coenzyme Q10 200 mg capsule Take 1 tablet by mouth once daily.    metoprolol succinate (TOPROL-XL) 50 MG 24 hr tablet TAKE 1 TABLET BY MOUTH EVERY DAY    tamsulosin (FLOMAX) 0.4 mg Cap TAKE 1 CAPSULE BY MOUTH EVERY DAY     Family History       Problem Relation (Age of Onset)     Asthma Maternal Uncle    Cancer Mother, Father    Heart disease Father    Hypertension Mother, Father          Tobacco Use    Smoking status: Never    Smokeless tobacco: Never   Substance and Sexual Activity    Alcohol use: Yes     Comment: rarely    Drug use: No    Sexual activity: Yes     Review of Systems   Constitutional: Negative for chills, fever and malaise/fatigue.   HENT:  Negative for congestion and nosebleeds.    Eyes:  Negative for blurred vision.   Cardiovascular:  Negative for chest pain, dyspnea on exertion, irregular heartbeat, leg swelling, near-syncope, orthopnea, palpitations, paroxysmal nocturnal dyspnea and syncope.   Respiratory:  Negative for cough, hemoptysis, shortness of breath, sleep disturbances due to breathing, sputum production and wheezing.    Endocrine: Negative for polyphagia.   Hematologic/Lymphatic: Negative for bleeding problem. Does not bruise/bleed easily.   Skin:  Negative for itching and rash.   Musculoskeletal:  Negative for back pain, joint swelling, muscle cramps and muscle weakness.   Gastrointestinal:  Negative for bloating, abdominal pain, hematemesis, hematochezia, nausea and vomiting.   Genitourinary:  Negative for dysuria and hematuria.   Neurological:  Negative for dizziness, focal weakness, headaches, light-headedness, loss of balance, numbness and weakness.   Psychiatric/Behavioral:  Negative for altered mental status.      Objective:     Vital Signs (Most Recent):  Temp: 98.8 °F (37.1 °C) (09/05/23 0821)  Pulse: 67 (09/05/23 0821)  Resp: 16 (09/05/23 0821)  BP: 136/73 (09/05/23 0838)  SpO2: 99 % (09/05/23 0821) Vital Signs (24h Range):  Temp:  [98.8 °F (37.1 °C)] 98.8 °F (37.1 °C)  Pulse:  [67] 67  Resp:  [16] 16  SpO2:  [99 %] 99 %  BP: (136-140)/(69-73) 136/73       Weight: 87.5 kg (193 lb)  Body mass index is 26.18 kg/m².    SpO2: 99 %     Physical Exam  Vitals and nursing note reviewed.   Constitutional:       General: He is not in acute distress.      Appearance: Normal appearance. He is well-developed. He is not diaphoretic.   HENT:      Head: Normocephalic and atraumatic.      Mouth/Throat:      Mouth: Mucous membranes are moist.      Pharynx: No oropharyngeal exudate.   Eyes:      Conjunctiva/sclera: Conjunctivae normal.      Pupils: Pupils are equal, round, and reactive to light.   Cardiovascular:      Rate and Rhythm: Normal rate and regular rhythm. No extrasystoles are present.     Pulses: Normal pulses and intact distal pulses.           Radial pulses are 2+ on the right side and 2+ on the left side.      Heart sounds: Normal heart sounds, S1 normal and S2 normal.  Pulmonary:      Effort: Pulmonary effort is normal. No accessory muscle usage or respiratory distress.      Breath sounds: Normal breath sounds. No decreased breath sounds, wheezing, rhonchi or rales.   Chest:      Chest wall: No tenderness.      Comments: Left chest wall device site in good condition  Abdominal:      General: Bowel sounds are normal. There is no distension.      Palpations: Abdomen is soft.      Tenderness: There is no abdominal tenderness. There is no guarding.   Musculoskeletal:         General: Normal range of motion.      Cervical back: Normal range of motion and neck supple.   Skin:     General: Skin is warm and dry.      Findings: No erythema or rash.   Neurological:      Mental Status: He is alert and oriented to person, place, and time. He is not disoriented.      Sensory: No sensory deficit.      Motor: No abnormal muscle tone.      Coordination: Coordination normal.      Gait: Gait normal.   Psychiatric:         Mood and Affect: Mood normal.         Behavior: Behavior normal.         Thought Content: Thought content normal.         Judgment: Judgment normal.     Significant Labs: Pre procedure labs from 8/30/23 reviewed    Significant Imaging:  ECG today shows sinus rhtyhm at 67 bpm  ms QRS 78 ms QT/Qtc 418/441 ms.    Assessment and Plan:   Plan:  -ILR removal  and re-implant  -Local anesthetic    Dr. Hamilton at bedside. Discussed with the patient the risks, benefits, and alternatives of ILR removal and re-implant. Our discussion of risks included (but was not limited to) the possibility of pain, infection, bleeding, rare risk of device erosion if left in, and death. All questions were answered. Patient verbalized understanding and wishes to proceed. No further questions voiced at this time. Patient would like to proceed with just local anesthetic, and deferred conscious sedation. Consents signed.    Anastasiya Solo NP  Cardiac Electrophysiology  [facility] - Arrhythmia    Attending: Shawn Hamilton MD

## 2023-09-05 NOTE — Clinical Note
The left chest was prepped. The site was prepped with ChloraPrep. The site was clipped. The patient was draped.

## 2023-09-05 NOTE — NURSING
Device rep at bedside setting up home monitor and discussing with patient.     Foam dressing applied at 11:03am.    Will monitor.

## 2023-09-05 NOTE — NURSING
Received report from Debo FIGUEROA RN. Patient s/p Loop removal/insertion, AAOx3. VSS, no c/o pain or discomfort at this time, resp even and unlabored. Dermabond to SHU is CDI. No active bleeding. No hematoma noted. Foam Dressing to be applied at 11am. Post procedure protocol reviewed with patient and patient's family. Understanding verbalized. Family members called to bedside. Nurse call bell within reach. Will continue to monitor per post procedure protocol.

## 2023-09-05 NOTE — TELEPHONE ENCOUNTER
----- Message from Anastasiya Solo NP sent at 9/5/2023  2:14 PM CDT -----  Regarding: Wound check follow up  Mr. Borden is scheduled for an in-clinic wound check 1 week post ILR removal and re-implant. Would it be ok if he sends photo of ILR site via portal instead of in clinic since he lives in [place]?    Thank you!  Bryce

## 2023-09-05 NOTE — HPI
"HPI yo male with NICM (rate related, resolved), PAC's, PVC's, nonsustained atrial tachycardia, atrial fibrillation, Sleep apnea (on CPAP).     Background:  Primary Cardiologist is Dr. Coleman.  Retired, [employer] .  Presented to PCP for routine visit, noted to have irregular heart beat.  Work-up revealed ectopy and cardiomyopathy.  Echo 2/16/17 EF 30-35%  Holter 2/17/17 2004 PVC's, frequent PAC's and "frequent runs of AT/AF."  Placed on beta blocker and ace inhibitor.  Echo 4/6/17 EF 30% LVEDD 6.2 cm, ALEXIS 28, moderate TR.  48 holter 4/6/17 1605 PVC's, 2431 PAC's frequent 3-20 beat runs of atrial tachycardia.  [facility] 4/24/17 normal coronary arteries.  Placed on Sotalol >> Continued to have frequent ectopy.  Holter 8/3/17 2065 PAC's with nearly incessant runs in nonsustained atrial tachycardia, 5492 PVC's  Echo 9/22/17 EF 35-40%     PVI (RFA) 10/31/17.  Has noted symptomatic benefit since then.  Holter 2/26/18 NSR with 23 PVC's, 31 PAC's, no AT/AF  Echo 2/26/18 EF 40-  We discontinued Sotalol and initiated Toprol.  Echo 10/29/19 normal biventricular structure and function ALEXIS 35  48 hr holter 10/29/18 nsr with 158 PAC's + 1 4 beat run of nonsustained AT     ILR implanted 11/21/19. Reveals no significant arrhythmias to date.  Echo 2/22/22 EF 55% moderate LAE      Update:     Feeling well. Walked [place] including 17.5 miles a in one day.  Device interrogation reveals no AF.       Doing great.  We discussed that he is nearing NOE on his ILR. I would strongly endorse replacement at that time, given his history of tachy-arrhythmia induced cardiomyopathy.  Continue with monitoring as scheduled, with me in one year.  "

## 2023-09-05 NOTE — SUBJECTIVE & OBJECTIVE
Past Medical History:   Diagnosis Date    Anticoagulant long-term use     Asthma     Cardiomyopathy     Decreased cardiac ejection fraction     (30% 4/6/17)    Hypercholesteremia     JEANINE on CPAP     PAC (premature atrial contraction)        Past Surgical History:   Procedure Laterality Date    CARDIAC CATHETERIZATION  04/2017    angiogram/[facility], [place]    INSERTION OF IMPLANTABLE LOOP RECORDER N/A 11/21/2019    Procedure: Insertion, Implantable Loop Recorder;  Surgeon: Shawn Hamilton MD;  Location: [facility];  Service: Cardiology;  Laterality: N/A;  PAF, ILR, MDT, SK, 3 Prep    TONSILLECTOMY      VASECTOMY         Review of patient's allergies indicates:  No Known Allergies    No current facility-administered medications on file prior to encounter.     Current Outpatient Medications on File Prior to Encounter   Medication Sig    cholecalciferol, vitamin D3, (VITAMIN D3) 50 mcg (2,000 unit) Cap Take 1 capsule by mouth once daily.    coenzyme Q10 200 mg capsule Take 1 tablet by mouth once daily.    metoprolol succinate (TOPROL-XL) 50 MG 24 hr tablet TAKE 1 TABLET BY MOUTH EVERY DAY    tamsulosin (FLOMAX) 0.4 mg Cap TAKE 1 CAPSULE BY MOUTH EVERY DAY     Family History       Problem Relation (Age of Onset)    Asthma Maternal Uncle    Cancer Mother, Father    Heart disease Father    Hypertension Mother, Father          Tobacco Use    Smoking status: Never    Smokeless tobacco: Never   Substance and Sexual Activity    Alcohol use: Yes     Comment: rarely    Drug use: No    Sexual activity: Yes     Review of Systems   Constitutional: Negative for chills, fever and malaise/fatigue.   HENT:  Negative for congestion and nosebleeds.    Eyes:  Negative for blurred vision.   Cardiovascular:  Negative for chest pain, dyspnea on exertion, irregular heartbeat, leg swelling, near-syncope, orthopnea, palpitations, paroxysmal nocturnal dyspnea and syncope.   Respiratory:  Negative for cough, hemoptysis, shortness of breath, sleep  disturbances due to breathing, sputum production and wheezing.    Endocrine: Negative for polyphagia.   Hematologic/Lymphatic: Negative for bleeding problem. Does not bruise/bleed easily.   Skin:  Negative for itching and rash.   Musculoskeletal:  Negative for back pain, joint swelling, muscle cramps and muscle weakness.   Gastrointestinal:  Negative for bloating, abdominal pain, hematemesis, hematochezia, nausea and vomiting.   Genitourinary:  Negative for dysuria and hematuria.   Neurological:  Negative for dizziness, focal weakness, headaches, light-headedness, loss of balance, numbness and weakness.   Psychiatric/Behavioral:  Negative for altered mental status.      Objective:     Vital Signs (Most Recent):  Temp: 98.8 °F (37.1 °C) (09/05/23 0821)  Pulse: 67 (09/05/23 0821)  Resp: 16 (09/05/23 0821)  BP: 136/73 (09/05/23 0838)  SpO2: 99 % (09/05/23 0821) Vital Signs (24h Range):  Temp:  [98.8 °F (37.1 °C)] 98.8 °F (37.1 °C)  Pulse:  [67] 67  Resp:  [16] 16  SpO2:  [99 %] 99 %  BP: (136-140)/(69-73) 136/73       Weight: 87.5 kg (193 lb)  Body mass index is 26.18 kg/m².    SpO2: 99 %        Physical Exam  Vitals and nursing note reviewed.   Constitutional:       General: He is not in acute distress.     Appearance: Normal appearance. He is well-developed. He is not diaphoretic.   HENT:      Head: Normocephalic and atraumatic.      Mouth/Throat:      Mouth: Mucous membranes are moist.      Pharynx: No oropharyngeal exudate.   Eyes:      Conjunctiva/sclera: Conjunctivae normal.      Pupils: Pupils are equal, round, and reactive to light.   Cardiovascular:      Rate and Rhythm: Normal rate and regular rhythm. No extrasystoles are present.     Pulses: Normal pulses and intact distal pulses.           Radial pulses are 2+ on the right side and 2+ on the left side.        Dorsalis pedis pulses are 2+ on the right side and 2+ on the left side.      Heart sounds: Normal heart sounds, S1 normal and S2 normal. No murmur  heard.  Pulmonary:      Effort: Pulmonary effort is normal. No accessory muscle usage or respiratory distress.      Breath sounds: Normal breath sounds. No decreased breath sounds, wheezing, rhonchi or rales.   Chest:      Chest wall: No tenderness.      Comments: Left chest wall device site in good condition  Abdominal:      General: Bowel sounds are normal. There is no distension.      Palpations: Abdomen is soft.      Tenderness: There is no abdominal tenderness. There is no guarding.   Musculoskeletal:         General: Normal range of motion.      Cervical back: Normal range of motion and neck supple.   Skin:     General: Skin is warm and dry.      Findings: No erythema or rash.   Neurological:      Mental Status: He is alert and oriented to person, place, and time. He is not disoriented.      Sensory: No sensory deficit.      Motor: No abnormal muscle tone.      Coordination: Coordination normal.      Gait: Gait normal.   Psychiatric:         Mood and Affect: Mood normal.         Behavior: Behavior normal.         Thought Content: Thought content normal.         Judgment: Judgment normal.            Significant Labs: None    Significant Imaging:  ECG

## 2023-09-05 NOTE — DISCHARGE INSTRUCTIONS
"Medications:  -Continue all of your home medications. No medication changes.    New Medications:  None.    Diet  -You may resume oral intake after you are discharged, as long you have no swallowing difficulties.    Activity:  -As tolerated.    Other Precautions:  -Avoid getting the area wet for about 5 days. You may shower in 48 hours. Do not let beam of shower hit site directly and no scrubbing in area. Do not submerge incision site in water for 2 weeks.    -You can remove the outside bandage in 48 hours. You should not remove the purple skin glue that covers your wound. This will come off on its own.  -Every day, take your temperature and check your incision for signs of infection (redness, swelling, drainage, or warmth) for the next 7 days. It is normal to have some pain around the site and some bruising. However constant pain, severe tenderness and pus/drainage coming from your wound is not normal and you should call your physician immediately or seek attention at the ER. Fevers and chills and feeling ill is not normal and you should seek immediate medical attention.  -Learn to take your own pulse. Keep a record of your results. Ask your doctor what pulse rate means you should call for  medical attention.  -Carry an ID card that contains information about your loop recorder. You can show this card if your  loop recorder sets  off a metal detector. You should also show it to avoid screening with a hand-held security wand.  -Keep your cell phone away from your loop recorder. Don't carry the phone in your shirt pocket, even when it's turned off.  -Avoid strong electrical fields. Examples are those made by radio transmitting towers, "ham" radios, and heavy-duty  electrical equipment.  -Avoid leaning over the open welch of a running car. A running engine creates an electrical field.    Follow-Up:  -Follow up for wound check in 1 week.  -Follow up with Dr. Hamilton in 3 months.  -Make regular follow-up appointments with " your doctor. He will check the loop recorder to make sure it's working  properly.    When to Call Your Doctor:  Call your doctor immediately if you have any of the following:  -Dizziness  -Chest pain  -Bleeding  -Weakness or numbness  -Visual, gait or speech disturbance  -Lack of energy  -Fainting spells  -Twitching chest muscles  -Rapid pulse or pounding heartbeat  -Shortness of breath  -Pain around your loop recorder  -Fever above 100.4°F or other signs of infection (redness, swelling, drainage, or warmth at the incision site)

## 2023-09-05 NOTE — DISCHARGE SUMMARY
"[facility] - Short Stay Cardiac Unit  Cardiac Electrophysiology  Discharge Summary      Patient Name: Luke Borden  MRN: [id number]  Admission Date: 9/5/2023  Hospital Length of Stay: 0 days  Discharge Date and Time: 9/5/2023 12:11 PM  Attending Physician: Shawn Hamilton MD  Discharging Provider: Anastasiya Solo NP  Primary Care Physician: CHRISTY Ruiz MD    HPI: Mr. Borden is a 67 year old male with NICM (rate related, resolved), PAC's, PVC's, nonsustained atrial tachycardia, atrial fibrillation, Sleep apnea (on CPAP), ILR implanted for AF management.     History obtained through patient report and notes:     Primary Cardiologist is Dr. Coleman.  Retired, [employer] .  Presented to PCP for routine visit, noted to have irregular heart beat.  Work-up revealed ectopy and cardiomyopathy.  Echo 2/16/17 EF 30-35%  Holter 2/17/17 2004 PVC's, frequent PAC's and "frequent runs of AT/AF."  Placed on beta blocker and ace inhibitor.  Echo 4/6/17 EF 30% LVEDD 6.2 cm, ALEXIS 28, moderate TR.  48 holter 4/6/17 1605 PVC's, 2431 PAC's frequent 3-20 beat runs of atrial tachycardia.  [facility] 4/24/17 normal coronary arteries.  Placed on Sotalol >> Continued to have frequent ectopy.  Holter 8/3/17 2065 PAC's with nearly incessant runs in nonsustained atrial tachycardia, 5492 PVC's  Echo 9/22/17 EF 35-40%     PVI (RFA) 10/31/17.  Has noted symptomatic benefit since then.  Holter 2/26/18 NSR with 23 PVC's, 31 PAC's, no AT/AF  Echo 2/26/18 EF 40-  We discontinued Sotalol and initiated Toprol.  Echo 10/29/19 normal biventricular structure and function ALEXIS 35  48 hr holter 10/29/18 nsr with 158 PAC's + 1 4 beat run of nonsustained AT     ILR implanted 11/21/19. Reveals no significant arrhythmias to date.  Echo 2/22/22 EF 55% moderate LAE      5/1/23 During clinic visit with Dr. Joy, he was feeling well. Walked [place] including 17.5 miles a in one day.  Device interrogation reveals no AF  Doing " great.  We discussed that he is nearing NOE on his ILR. I would strongly endorse replacement at that time, given his history of tachy-arrhythmia induced cardiomyopathy.  Continue with monitoring as scheduled, with me in one year.     7/13/23 [place] alert received for ILR at [facility] since 7/12/23.      Mr. Borden presents today to [facility] for scheduled ILR removal and re-implant with Dr. Hamilton. He denies any chest pain, palpitations, SOB, ALEXANDER, dizziness, light headedness, weakness, syncope, or near syncopal episodes. He denies any bleeding, infections, fevers, rashes, or surgeries in the past 30 days. He is currently not taking any OAC. Patient would like to proceed with just local anesthetic, and deferred conscious sedation.      ECG today shows sinus rhtyhm at 67 bpm  ms QRS 78 ms QT/Qtc 418/441 ms.    Procedure(s) (LRB):  REMOVAL, IMPLANTABLE LOOP RECORDER (N/A)  Insertion, Implantable Loop Recorder (N/A)     Indwelling Lines/Drains at time of discharge:  Lines/Drains/Airways     None   Hospital Course: Patient underwent ILR removal and ILR re-implant, tolerated procedure well with no acute complications noted. Left chest site with Aquacel foam dressing, C/D/I with no bleeding, drainage, hematoma, or pain to site. VSS. Patient to follow up with device clinic in 1 week for wound check (may send photo of ILR site via portal as patient lives in [place]). Follow up in 3 months with Dr. Hamilton. Continue all home medications.  Discharge plans/instructions discussed with patient and wife who verbalized understanding and agreement of plans of care. No further questions or concerns voiced at this time. Patient discharged home in stable condition.     Goals of Care Treatment Preferences:  Code Status: Full Code    Consults:  None    Significant Diagnostic Studies: N/A    Final Active Diagnoses:    Diagnosis Date Noted POA    PRINCIPAL PROBLEM:  PAF (paroxysmal atrial fibrillation) [I48.0] 10/30/2017 Yes      Problems  Resolved During this Admission:     Discharged Condition: stable    Disposition: Home or Self Care    Follow Up:   Follow-up Information     [facility] Elect[facility] 3rd Fl Follow up in 1 week(s).    Specialty: Electrophysiology  Why: Post ILR removal, Post ILR implant, For wound re-check  Contact information:  [address]  [facility] [id number] [phone]  Additional information:  [facility] - Main Building - [facility] [place], 3rd Floor   Please park in [facility] and use Clinic elevator           Shawn Hamilton MD Follow up in 3 month(s).    Specialties: Electrophysiology, Cardiology  Why: Post ILR removal, Post ILR implant  Contact information:  [address]  [facility] [address]  [phone]                       Patient Instructions:      Notify your health care provider if you experience any of the following:  increased confusion or weakness     Notify your health care provider if you experience any of the following:  persistent dizziness, light-headedness, or visual disturbances     Notify your health care provider if you experience any of the following:  worsening rash     Notify your health care provider if you experience any of the following:  severe persistent headache     Notify your health care provider if you experience any of the following:  difficulty breathing or increased cough     Notify your health care provider if you experience any of the following:  redness, tenderness, or signs of infection (pain, swelling, redness, odor or green/yellow discharge around incision site)     Notify your health care provider if you experience any of the following:  severe uncontrolled pain     Notify your health care provider if you experience any of the following:  temperature >100.4     Notify your health care provider if you experience any of the following:  persistent nausea and vomiting or diarrhea     Remove dressing in 24 hours     Activity as tolerated  "  Order Comments: Medications:  -Continue all of your home medications. No medication changes.    New Medications:  None.    Diet  -You may resume oral intake after you are discharged, as long you have no swallowing difficulties.    Activity:  -As tolerated.    Other Precautions:  -Avoid getting the area wet for about 5 days. You may shower in 48 hours. Do not let beam of shower hit site directly and no scrubbing in area. Do not submerge incision site in water for 2 weeks.    -You can remove the outside bandage in 48 hours. You should not remove the purple skin glue that covers your wound. This will come off on its own.  -Every day, take your temperature and check your incision for signs of infection (redness, swelling, drainage, or warmth) for the next 7 days. It is normal to have some pain around the site and some bruising. However constant pain, severe tenderness and pus/drainage coming from your wound is not normal and you should call your physician immediately or seek attention at the ER. Fevers and chills and feeling ill is not normal and you should seek immediate medical attention.  -Learn to take your own pulse. Keep a record of your results. Ask your doctor what pulse rate means you should call for  medical attention.  -Carry an ID card that contains information about your loop recorder. You can show this card if your  loop recorder sets  off a metal detector. You should also show it to avoid screening with a hand-held security wand.  -Keep your cell phone away from your loop recorder. Don't carry the phone in your shirt pocket, even when it's turned off.  -Avoid strong electrical fields. Examples are those made by radio transmitting towers, "ham" radios, and heavy-duty  electrical equipment.  -Avoid leaning over the open welch of a running car. A running engine creates an electrical field.    Follow-Up:  -Follow up for wound check in 1 week.  -Follow up with Dr. Hamilton in 3 months.  -Make regular follow-up " appointments with your doctor. He will check the loop recorder to make sure it's working  properly.    When to Call Your Doctor:  Call your doctor immediately if you have any of the following:  -Dizziness  -Chest pain  -Bleeding  -Weakness or numbness  -Visual, gait or speech disturbance  -Lack of energy  -Fainting spells  -Twitching chest muscles  -Rapid pulse or pounding heartbeat  -Shortness of breath  -Pain around your loop recorder  -Fever above 100.4°F or other signs of infection (redness, swelling, drainage, or warmth at the incision site)     Medications:  Reconciled Home Medications:      Medication List      CONTINUE taking these medications    atorvastatin 10 MG tablet  Commonly known as: LIPITOR  Take 0.5 tablets (5 mg total) by mouth once daily.     cholecalciferol (vitamin D3) 50 mcg (2,000 unit) Cap capsule  Commonly known as: VITAMIN D3  Take 1 capsule by mouth once daily.     coenzyme Q10 200 mg capsule  Take 1 tablet by mouth once daily.     metoprolol succinate 50 MG 24 hr tablet  Commonly known as: TOPROL-XL  TAKE 1 TABLET BY MOUTH EVERY DAY     tamsulosin 0.4 mg Cap  Commonly known as: FLOMAX  TAKE 1 CAPSULE BY MOUTH EVERY DAY          Plan:  -Continue all home medications.  -Patient to follow up with device clinic in 1 week for wound check (may send photo of ILR site via portal as patient lives in [place]).   -Follow up in 3 months with Dr. Hamilton.     Time spent on the discharge of patient: 10 minutes    Anastasiya Solo NP  Cardiac Electrophysiology  [facility]    Attending: Shawn Hamilton MD

## 2023-09-05 NOTE — NURSING
NP Bryce S at bedside speaking with patient.     Patient discharged per MD orders. Instructions given on medications, wound care, activity, signs of infection, when to call MD, and follow up appointments. Pt verbalized understanding.  Patient AAOx3, VSS, no c/o pain or discomfort at this time. PIV removed. Patient left unit ambulatory with family.

## 2023-09-05 NOTE — TELEPHONE ENCOUNTER
Returned the pt's call on this afternoon and informed him that he can send a picture of the implant and explant wounds via the pt portal in a week.  Per pt requested the in clinic wound/ILR appt on 9/18/23 was cancelled. Understanding was verbalized.  Pt appreciated the call.

## 2023-09-07 DIAGNOSIS — I47.19 ATRIAL TACHYCARDIA: ICD-10-CM

## 2023-09-07 DIAGNOSIS — I47.19 ATRIAL TACHYCARDIA: Primary | ICD-10-CM

## 2023-09-11 RX ORDER — METOPROLOL SUCCINATE 50 MG/1
TABLET, EXTENDED RELEASE ORAL
Qty: 90 TABLET | Refills: 3 | Status: SHIPPED | OUTPATIENT
Start: 2023-09-11 | End: 2024-03-18 | Stop reason: SDUPTHER

## 2023-09-12 ENCOUNTER — TELEPHONE (OUTPATIENT)
Dept: ELECTROPHYSIOLOGY | Facility: CLINIC | Age: 68
End: 2023-09-12
Payer: MEDICARE

## 2023-09-12 ENCOUNTER — PATIENT MESSAGE (OUTPATIENT)
Dept: ELECTROPHYSIOLOGY | Facility: CLINIC | Age: 68
End: 2023-09-12
Payer: MEDICARE

## 2023-09-12 NOTE — TELEPHONE ENCOUNTER
Reviewed picture of loop removals, see in Media.  No s/s of infection and dermabond intact.  Cancelled pt's wound check.  Discussed wound care.  Pt will call if has s/s of infection.

## 2023-10-01 ENCOUNTER — CLINICAL SUPPORT (OUTPATIENT)
Dept: CARDIOLOGY | Facility: HOSPITAL | Age: 68
End: 2023-10-01
Payer: MEDICARE

## 2023-10-01 DIAGNOSIS — Z95.818 PRESENCE OF OTHER CARDIAC IMPLANTS AND GRAFTS: ICD-10-CM

## 2023-10-12 ENCOUNTER — CLINICAL SUPPORT (OUTPATIENT)
Dept: CARDIOLOGY | Facility: HOSPITAL | Age: 68
End: 2023-10-12
Attending: INTERNAL MEDICINE
Payer: MEDICARE

## 2023-10-12 DIAGNOSIS — Z95.818 PRESENCE OF OTHER CARDIAC IMPLANTS AND GRAFTS: ICD-10-CM

## 2023-10-12 LAB
OHS CV AF BURDEN PERCENT: < 1
OHS CV DC REMOTE DEVICE TYPE: NORMAL

## 2023-10-12 PROCEDURE — 93298 REM INTERROG DEV EVAL SCRMS: CPT | Mod: ,,, | Performed by: INTERNAL MEDICINE

## 2023-10-12 PROCEDURE — 93298 CARDIAC DEVICE CHECK - REMOTE: ICD-10-PCS | Mod: ,,, | Performed by: INTERNAL MEDICINE

## 2023-10-26 ENCOUNTER — PATIENT MESSAGE (OUTPATIENT)
Dept: ELECTROPHYSIOLOGY | Facility: CLINIC | Age: 68
End: 2023-10-26
Payer: MEDICARE

## 2023-11-14 ENCOUNTER — CLINICAL SUPPORT (OUTPATIENT)
Dept: CARDIOLOGY | Facility: HOSPITAL | Age: 68
End: 2023-11-14
Payer: MEDICARE

## 2023-11-14 ENCOUNTER — CLINICAL SUPPORT (OUTPATIENT)
Dept: CARDIOLOGY | Facility: HOSPITAL | Age: 68
End: 2023-11-14
Attending: INTERNAL MEDICINE
Payer: MEDICARE

## 2023-11-14 DIAGNOSIS — Z95.818 PRESENCE OF OTHER CARDIAC IMPLANTS AND GRAFTS: ICD-10-CM

## 2023-11-14 PROCEDURE — 93298 CARDIAC DEVICE CHECK - REMOTE: ICD-10-PCS | Mod: ,,, | Performed by: INTERNAL MEDICINE

## 2023-11-14 PROCEDURE — 93298 REM INTERROG DEV EVAL SCRMS: CPT | Mod: ,,, | Performed by: INTERNAL MEDICINE

## 2023-11-16 LAB
OHS CV AF BURDEN PERCENT: < 1
OHS CV DC REMOTE DEVICE TYPE: NORMAL

## 2023-11-30 ENCOUNTER — TELEPHONE (OUTPATIENT)
Dept: ELECTROPHYSIOLOGY | Facility: CLINIC | Age: 68
End: 2023-11-30
Payer: MEDICARE

## 2023-11-30 NOTE — PROGRESS NOTES
"Mr. Borden is a patient of Dr. Hamilton and was last seen in clinic 5/1/2023.      Subjective:   Patient ID:  Luke Borden is a 68 y.o. male who presents for follow up of Loop recorder  .     HPI:    Mr. Borden is a 68 y.o. male with NICM (rate related, resolved), PAC's, PVC's, nonsustained atrial tachycardia, atrial fibrillation, Sleep apnea (on CPAP) here for follow up after loop recorder removal and replacement.    Background:    Primary Cardiologist is Dr. Coleman.  Retired, [employer] .  Presented to PCP for routine visit, noted to have irregular heart beat.  Work-up revealed ectopy and cardiomyopathy.  Echo 2/16/17 EF 30-35%  Holter 2/17/17 2004 PVC's, frequent PAC's and "frequent runs of AT/AF."  Placed on beta blocker and ace inhibitor.  Echo 4/6/17 EF 30% LVEDD 6.2 cm, ALEXIS 28, moderate TR.  48 holter 4/6/17 1605 PVC's, 2431 PAC's frequent 3-20 beat runs of atrial tachycardia.  LHC 4/24/17 normal coronary arteries.  Placed on Sotalol >> Continued to have frequent ectopy.  Holter 8/3/17 2065 PAC's with nearly incessant runs in nonsustained atrial tachycardia, 5492 PVC's  Echo 9/22/17 EF 35-40%     PVI (RFA) 10/31/17.  Has noted symptomatic benefit since then.  Holter 2/26/18 NSR with 23 PVC's, 31 PAC's, no AT/AF  Echo 2/26/18 EF 40-  We discontinued Sotalol and initiated Toprol.  Echo 10/29/19 normal biventricular structure and function ALEXIS 35  48 hr holter 10/29/18 nsr with 158 PAC's + 1 4 beat run of nonsustained AT     ILR implanted 11/21/19. Reveals no significant arrhythmias to date.  Echo 2/22/22 EF 55% moderate LAE      5/1/2023: Feeling well. Walked [place] including 17.5 miles a in one day.  Device interrogation reveals no AF.  We discussed that he is nearing NOE on his ILR. I would strongly endorse replacement at that time, given his history of tachy-arrhythmia induced cardiomyopathy.  Continue with monitoring as scheduled, with me in one year.    Update " (12/05/2023):    9/5/2023:    Successful implantation of Loop Recorder.    Successful loop recorder removal.    Today he says he is feeling at baseline with no new cardiac complaints. Mr. Borden reports no chest pain with exertion or at rest, palpitations, SOB, ALEXANDER, dizziness, or syncope.    He is on toprol 50mg daily.  ILR shows no AF to date.    I have personally reviewed the patient's EKG today, which shows sinus bradycardia at 53bpm. KS interval is 186. QRS is 74. QT is 426.    Relevant Cardiac Test Results:    2D Echo (2/22/2022):  The left ventricle is normal in size with normal systolic function.  The estimated ejection fraction is 55%.  Normal left ventricular diastolic function.  The estimated PA systolic pressure is 34 mmHg.  Normal right ventricular size with normal right ventricular systolic function.  Moderate left atrial enlargement.  Normal central venous pressure (3 mmHg).  Moderate right atrial enlargement.  Mild-to-moderate aortic regurgitation.  Mild tricuspid regurgitation.    Current Outpatient Medications   Medication Sig    atorvastatin (LIPITOR) 10 MG tablet Take 0.5 tablets (5 mg total) by mouth once daily.    cholecalciferol, vitamin D3, (VITAMIN D3) 50 mcg (2,000 unit) Cap Take 1 capsule by mouth once daily.    coenzyme Q10 200 mg capsule Take 1 tablet by mouth once daily.    metoprolol succinate (TOPROL-XL) 50 MG 24 hr tablet TAKE 1 TABLET BY MOUTH EVERY DAY    tamsulosin (FLOMAX) 0.4 mg Cap TAKE 1 CAPSULE BY MOUTH EVERY DAY     No current facility-administered medications for this visit.     Facility-Administered Medications Ordered in Other Visits   Medication    ceFAZolin 2 g in dextrose 5 % in water (D5W) 50 mL IVPB (MB+)       Review of Systems   Constitutional: Negative for malaise/fatigue.   Cardiovascular:  Negative for chest pain, dyspnea on exertion, irregular heartbeat, leg swelling and palpitations.   Respiratory:  Negative for shortness of breath.    Hematologic/Lymphatic:  Negative for bleeding problem.   Skin:  Negative for rash.   Musculoskeletal:  Negative for myalgias.   Gastrointestinal:  Negative for hematemesis, hematochezia and nausea.   Genitourinary:  Negative for hematuria.   Neurological:  Negative for light-headedness.   Psychiatric/Behavioral:  Negative for altered mental status.    Allergic/Immunologic: Negative for persistent infections.       Objective:          /70   Pulse (!) 53   Wt 88.3 kg (194 lb 10.7 oz)   BMI 26.40 kg/m²     Physical Exam  Vitals and nursing note reviewed.   Constitutional:       Appearance: Normal appearance. He is well-developed.   HENT:      Head: Normocephalic.      Nose: Nose normal.   Eyes:      Pupils: Pupils are equal, round, and reactive to light.   Cardiovascular:      Rate and Rhythm: Regular rhythm. Bradycardia present.   Pulmonary:      Effort: No respiratory distress.      Breath sounds: Normal breath sounds.   Musculoskeletal:         General: Normal range of motion.   Skin:     General: Skin is warm and dry.      Findings: No erythema.   Neurological:      Mental Status: He is alert and oriented to person, place, and time.   Psychiatric:         Speech: Speech normal.         Behavior: Behavior normal.           Lab Results   Component Value Date     08/30/2023    K 4.4 08/30/2023    MG 1.9 05/24/2017    BUN 18 08/30/2023    CREATININE 0.9 08/30/2023    ALT 15 09/12/2022    AST 23 09/12/2022    HGB 13.0 (L) 08/30/2023    HCT 39.5 (L) 08/30/2023    TSH 1.122 09/16/2019    LDLCALC 86.4 09/12/2022       Recent Labs   Lab 08/30/23  0743   INR 1.0       Assessment:     1. Status post placement of implantable loop recorder    2. PAF (paroxysmal atrial fibrillation)    3. Premature ventricular contractions (PVCs) (VPCs)    4. History of cardiomyopathy    5. Obstructive sleep apnea syndrome      Plan:     In summary, Mr. Borden is a 68 y.o. male with NICM (rate related, resolved), PAC's, PVC's, nonsustained atrial  tachycardia, atrial fibrillation, Sleep apnea (on CPAP) here for follow up after ILR removal and replace.  He is 3 mo s/p loop recorder removal and replacement. Doing well from a rhythm standpoint, with no documented arrhythmia on loop to date. He is feeling well. Using CPAP. On metoprolol. Izaiah but denies LH. CHADSVASc 1 not on OAC. We reviewed process of monitoring with monthly reports. Home monitor functioning.     Continue current regimen  COntinue monthly loop reports  RTC 1 yr (virtual or [facility] pt request), sooner if needed    *A copy of this note has been sent to Dr. Hamilton*    Follow up in about 1 year (around 12/5/2024).    ------------------------------------------------------------------    SARATH Ruiz, NP-C  Cardiac Electrophysiology

## 2023-12-05 ENCOUNTER — OFFICE VISIT (OUTPATIENT)
Dept: ELECTROPHYSIOLOGY | Facility: CLINIC | Age: 68
End: 2023-12-05
Payer: MEDICARE

## 2023-12-05 ENCOUNTER — HOSPITAL ENCOUNTER (OUTPATIENT)
Dept: CARDIOLOGY | Facility: CLINIC | Age: 68
Discharge: HOME | End: 2023-12-05
Payer: MEDICARE

## 2023-12-05 VITALS
DIASTOLIC BLOOD PRESSURE: 70 MMHG | SYSTOLIC BLOOD PRESSURE: 122 MMHG | HEART RATE: 53 BPM | BODY MASS INDEX: 26.4 KG/M2 | WEIGHT: 194.69 LBS

## 2023-12-05 DIAGNOSIS — Z95.818 STATUS POST PLACEMENT OF IMPLANTABLE LOOP RECORDER: Primary | ICD-10-CM

## 2023-12-05 DIAGNOSIS — I49.3 PREMATURE VENTRICULAR CONTRACTIONS (PVCS) (VPCS): ICD-10-CM

## 2023-12-05 DIAGNOSIS — G47.33 OBSTRUCTIVE SLEEP APNEA SYNDROME: ICD-10-CM

## 2023-12-05 DIAGNOSIS — I48.0 PAF (PAROXYSMAL ATRIAL FIBRILLATION): ICD-10-CM

## 2023-12-05 DIAGNOSIS — I47.19 ATRIAL TACHYCARDIA: ICD-10-CM

## 2023-12-05 DIAGNOSIS — Z86.79 HISTORY OF CARDIOMYOPATHY: ICD-10-CM

## 2023-12-05 PROCEDURE — 99999 PR PBB SHADOW E&M-EST. PATIENT-LVL III: ICD-10-PCS | Mod: PBBFAC,,, | Performed by: NURSE PRACTITIONER

## 2023-12-05 PROCEDURE — 99213 OFFICE O/P EST LOW 20 MIN: CPT | Mod: PBBFAC | Performed by: NURSE PRACTITIONER

## 2023-12-05 PROCEDURE — 93005 ELECTROCARDIOGRAM TRACING: CPT | Mod: PBBFAC | Performed by: INTERNAL MEDICINE

## 2023-12-05 PROCEDURE — 93010 ELECTROCARDIOGRAM REPORT: CPT | Mod: S$PBB,,, | Performed by: INTERNAL MEDICINE

## 2023-12-05 PROCEDURE — 99214 PR OFFICE/OUTPT VISIT, EST, LEVL IV, 30-39 MIN: ICD-10-PCS | Mod: S$PBB,,, | Performed by: NURSE PRACTITIONER

## 2023-12-05 PROCEDURE — 93010 RHYTHM STRIP: ICD-10-PCS | Mod: S$PBB,,, | Performed by: INTERNAL MEDICINE

## 2023-12-05 PROCEDURE — 99999 PR PBB SHADOW E&M-EST. PATIENT-LVL III: CPT | Mod: PBBFAC,,, | Performed by: NURSE PRACTITIONER

## 2023-12-05 PROCEDURE — 99214 OFFICE O/P EST MOD 30 MIN: CPT | Mod: S$PBB,,, | Performed by: NURSE PRACTITIONER

## 2023-12-05 RX ORDER — KETOCONAZOLE 20 MG/ML
1 SHAMPOO, SUSPENSION TOPICAL
COMMUNITY
Start: 2023-11-15

## 2023-12-19 ENCOUNTER — CLINICAL SUPPORT (OUTPATIENT)
Dept: CARDIOLOGY | Facility: HOSPITAL | Age: 68
End: 2023-12-19
Payer: MEDICARE

## 2023-12-19 ENCOUNTER — CLINICAL SUPPORT (OUTPATIENT)
Dept: CARDIOLOGY | Facility: HOSPITAL | Age: 68
End: 2023-12-19
Attending: INTERNAL MEDICINE
Payer: MEDICARE

## 2023-12-19 DIAGNOSIS — Z95.818 PRESENCE OF OTHER CARDIAC IMPLANTS AND GRAFTS: ICD-10-CM

## 2023-12-19 PROCEDURE — 93298 CARDIAC DEVICE CHECK - REMOTE: ICD-10-PCS | Mod: ,,, | Performed by: INTERNAL MEDICINE

## 2023-12-19 PROCEDURE — 93298 REM INTERROG DEV EVAL SCRMS: CPT | Mod: ,,, | Performed by: INTERNAL MEDICINE

## 2023-12-27 LAB
OHS CV AF BURDEN PERCENT: < 1
OHS CV DC REMOTE DEVICE TYPE: NORMAL

## 2024-01-18 NOTE — TELEPHONE ENCOUNTER
----- Message from Tami Marinelli sent at 11/16/2017  9:09 AM CST -----  Contact: self  Patient called asking for advice about the results of the CT scan and specific the 4.3 cm left renal cyst.  Please call patient at [phone]. Thanks!     Male

## 2024-01-23 ENCOUNTER — CLINICAL SUPPORT (OUTPATIENT)
Dept: CARDIOLOGY | Facility: HOSPITAL | Age: 69
End: 2024-01-23
Attending: INTERNAL MEDICINE
Payer: MEDICARE

## 2024-01-23 ENCOUNTER — CLINICAL SUPPORT (OUTPATIENT)
Dept: CARDIOLOGY | Facility: HOSPITAL | Age: 69
End: 2024-01-23

## 2024-01-23 DIAGNOSIS — Z95.818 PRESENCE OF OTHER CARDIAC IMPLANTS AND GRAFTS: ICD-10-CM

## 2024-01-23 PROCEDURE — 93298 REM INTERROG DEV EVAL SCRMS: CPT | Mod: 26,,, | Performed by: INTERNAL MEDICINE

## 2024-02-27 ENCOUNTER — CLINICAL SUPPORT (OUTPATIENT)
Dept: CARDIOLOGY | Facility: HOSPITAL | Age: 69
End: 2024-02-27
Attending: INTERNAL MEDICINE
Payer: MEDICARE

## 2024-02-27 ENCOUNTER — CLINICAL SUPPORT (OUTPATIENT)
Dept: CARDIOLOGY | Facility: HOSPITAL | Age: 69
End: 2024-02-27

## 2024-02-27 DIAGNOSIS — Z95.818 PRESENCE OF OTHER CARDIAC IMPLANTS AND GRAFTS: ICD-10-CM

## 2024-02-27 PROCEDURE — 93298 REM INTERROG DEV EVAL SCRMS: CPT | Mod: 26,,, | Performed by: INTERNAL MEDICINE

## 2024-03-11 ENCOUNTER — PATIENT MESSAGE (OUTPATIENT)
Dept: CARDIOLOGY | Facility: CLINIC | Age: 69
End: 2024-03-11
Payer: MEDICARE

## 2024-03-12 ENCOUNTER — TELEPHONE (OUTPATIENT)
Dept: CARDIOLOGY | Facility: CLINIC | Age: 69
End: 2024-03-12
Payer: MEDICARE

## 2024-03-12 ENCOUNTER — TELEPHONE (OUTPATIENT)
Dept: CARDIOLOGY | Facility: HOSPITAL | Age: 69
End: 2024-03-12
Payer: MEDICARE

## 2024-03-12 NOTE — TELEPHONE ENCOUNTER
----- Message from Bettie Cuadra MA sent at 3/12/2024  2:10 PM CDT -----  Regarding: RE: Loop Recorder Report  Not a problem, I sent it to our device team and they are going to look into it.  ----- Message -----  From: Sury Argueta RN  Sent: 3/12/2024   1:29 PM CDT  To: Bettie Cuadra MA; [facility] Clinical Staff  Subject: Loop Recorder Report                             This pt has a loop and states he has not received a report since December and was wondering if he could get a report. I have no idea how to help him with this hahaha I have him rescheduled to June 3rd.    Thanks,  Sury Argueta RN

## 2024-03-12 NOTE — TELEPHONE ENCOUNTER
Explained to Mr. Borden the clinic was working through some interface issues which is why his loop reports have not been e-signed by Dr. Hamilton and available to him in his portal.  I reviewed the results of his last two monthly reports with him over the phone.  He thanked me for the call and the information.

## 2024-03-28 ENCOUNTER — TELEPHONE (OUTPATIENT)
Dept: ELECTROPHYSIOLOGY | Facility: CLINIC | Age: 69
End: 2024-03-28
Payer: MEDICARE

## 2024-03-28 NOTE — TELEPHONE ENCOUNTER
Returned the pt's call on this afternoon and informed him the reports are awaiting the doctor's signature.  Also informed pt that his next scheduled transmission is on 4/2/24.  Understanding was verbalized.  Pt appreciated the call.

## 2024-03-28 NOTE — TELEPHONE ENCOUNTER
----- Message from Zeinab Cummins RN sent at 3/28/2024  9:35 AM CDT -----  Regarding: FW: Report    ----- Message -----  From: Aurora Aguilera  Sent: 3/28/2024   9:02 AM CDT  To: Zeinab Cummins RN  Subject: Report                                           Pt [phone] would like a call in ref to his loop recorder report.    Thanks

## 2024-04-02 ENCOUNTER — CLINICAL SUPPORT (OUTPATIENT)
Dept: CARDIOLOGY | Facility: HOSPITAL | Age: 69
End: 2024-04-02
Payer: MEDICARE

## 2024-04-02 ENCOUNTER — CLINICAL SUPPORT (OUTPATIENT)
Dept: CARDIOLOGY | Facility: HOSPITAL | Age: 69
End: 2024-04-02
Attending: INTERNAL MEDICINE
Payer: MEDICARE

## 2024-04-02 DIAGNOSIS — Z95.818 PRESENCE OF OTHER CARDIAC IMPLANTS AND GRAFTS: ICD-10-CM

## 2024-04-02 PROCEDURE — 93298 REM INTERROG DEV EVAL SCRMS: CPT | Mod: 26,,, | Performed by: INTERNAL MEDICINE

## 2024-04-03 LAB
OHS CV AF BURDEN PERCENT: < 1
OHS CV AF BURDEN PERCENT: < 1
OHS CV DC REMOTE DEVICE TYPE: NORMAL
OHS CV DC REMOTE DEVICE TYPE: NORMAL

## 2024-04-18 LAB
OHS CV AF BURDEN PERCENT: < 1
OHS CV DC REMOTE DEVICE TYPE: NORMAL

## 2024-05-07 ENCOUNTER — CLINICAL SUPPORT (OUTPATIENT)
Dept: CARDIOLOGY | Facility: HOSPITAL | Age: 69
End: 2024-05-07
Payer: MEDICARE

## 2024-05-07 ENCOUNTER — CLINICAL SUPPORT (OUTPATIENT)
Dept: CARDIOLOGY | Facility: HOSPITAL | Age: 69
End: 2024-05-07
Attending: INTERNAL MEDICINE
Payer: MEDICARE

## 2024-05-07 DIAGNOSIS — Z95.818 PRESENCE OF OTHER CARDIAC IMPLANTS AND GRAFTS: ICD-10-CM

## 2024-05-07 PROCEDURE — 93298 REM INTERROG DEV EVAL SCRMS: CPT | Mod: 26,,, | Performed by: INTERNAL MEDICINE

## 2024-05-07 PROCEDURE — 93298 REM INTERROG DEV EVAL SCRMS: CPT | Performed by: INTERNAL MEDICINE

## 2024-05-22 LAB
OHS CV AF BURDEN PERCENT: < 1
OHS CV DC REMOTE DEVICE TYPE: NORMAL

## 2024-05-30 ENCOUNTER — CLINICAL SUPPORT (OUTPATIENT)
Dept: CARDIOLOGY | Facility: HOSPITAL | Age: 69
End: 2024-05-30
Payer: MEDICARE

## 2024-05-30 DIAGNOSIS — Z95.818 PRESENCE OF OTHER CARDIAC IMPLANTS AND GRAFTS: ICD-10-CM

## 2024-06-02 NOTE — PROGRESS NOTES
"    PCP - CHRISTY Ruiz MD  Subjective:   Luke Borden is a 68 y.o. male who presents for follow-up of PAF (Annual f/u )        HPI yo male with NICM (rate related, resolved), PAC's, PVC's, nonsustained atrial tachycardia, atrial fibrillation, Sleep apnea (on CPAP).     Background:  Primary Cardiologist is Dr. Coleman.  Retired, [employer] .  Presented to PCP for routine visit, noted to have irregular heart beat.  Work-up revealed ectopy and cardiomyopathy.  Echo 2/16/17 EF 30-35%  Holter 2/17/17 2004 PVC's, frequent PAC's and "frequent runs of AT/AF."  Placed on beta blocker and ace inhibitor.  Echo 4/6/17 EF 30% LVEDD 6.2 cm, ALEXIS 28, moderate TR.  48 holter 4/6/17 1605 PVC's, 2431 PAC's frequent 3-20 beat runs of atrial tachycardia.  C 4/24/17 normal coronary arteries.  Placed on Sotalol >> Continued to have frequent ectopy.  Holter 8/3/17 2065 PAC's with nearly incessant runs in nonsustained atrial tachycardia, 5492 PVC's  Echo 9/22/17 EF 35-40%     PVI (RFA) 10/31/17.  Has noted symptomatic benefit since then.  Holter 2/26/18 NSR with 23 PVC's, 31 PAC's, no AT/AF  Echo 2/26/18 EF 40-45%  We discontinued Sotalol and initiated Toprol.  Echo 10/29/19 normal biventricular structure and function ALEXIS 35  48 hr holter 10/29/18 nsr with 158 PAC's + 1 4 beat run of nonsustained AT     ILR implanted 11/21/19. Reveals no significant arrhythmias to date.  Echo 2/22/22 EF 55% moderate LAE      5/2023  Feeling well. Walked [place] including 17.5 miles a in one day.  Device interrogation reveals no AF.    12/2023:  Doing well post-ILR removal and reimplant (9/2023)    5/2024:  Doing great. Walked [place] again. Forgot to take his CPAP while in [place].  Denies shortness of breath, palpitations.    ILR with 0% AF. Not on OAC (CHADS 1). No AAD use. Tolerating Toprol 50 mg QD.    History:     Social History     Tobacco Use    Smoking status: Never    Smokeless tobacco: Never   Substance " Use Topics    Alcohol use: Yes     Comment: rarely     Family History   Problem Relation Name Age of Onset    Hypertension Mother 90yrs old     Cancer Mother 90yrs old         breast    Heart disease Father 92 yrs old     Hypertension Father 92 yrs old     Cancer Father 92 yrs old         melanoma behind right ear    Asthma Maternal Uncle         Meds:   Review of patient's allergies indicates:  No Known Allergies    Current Outpatient Medications:     atorvastatin (LIPITOR) 10 MG tablet, Take 1 tablet (10 mg total) by mouth every evening., Disp: 90 tablet, Rfl: 3    cholecalciferol, vitamin D3, (VITAMIN D3) 50 mcg (2,000 unit) Cap, Take 1 capsule by mouth once daily., Disp: , Rfl:     coenzyme Q10 200 mg capsule, Take 1 tablet by mouth once daily., Disp: , Rfl:     fish oil-omega-3-vit C-vit E 2,000-650-12 mg/2.5 gram [place], , Disp: , Rfl:     ketoconazole (NIZORAL) 2 % shampoo, Apply 1 application  topically 3 (three) times a week., Disp: , Rfl:     metoprolol succinate (TOPROL-XL) 50 MG 24 hr tablet, Take 1 tablet (50 mg total) by mouth once daily., Disp: 90 tablet, Rfl: 3    tamsulosin (FLOMAX) 0.4 mg Cap, TAKE 1 CAPSULE BY MOUTH EVERY DAY, Disp: 90 capsule, Rfl: 3  No current facility-administered medications for this visit.    Facility-Administered Medications Ordered in Other Visits:     ceFAZolin 2 g in dextrose 5 % in water (D5W) 50 mL IVPB (MB+), 2 g, Intravenous, On Call Procedure, Nidhi Gamboa, NP    Constitution: Negative for fever or chills. Negative for weight loss or gain.   HENT: Negative for sore throat or headaches. Negative for rhinorrhea.  Eyes: Negative for blurred or double vision.   Cardiovascular: See above  Pulmonary: Negative for SOB. Negative for cough.   Gastrointestinal: Negative for abdominal pain. Negative for nausea/ vomiting. Negative for diarrhea.   : Negative for dysuria.   Neurological: Negative for focal weakness or sensory changes.    Objective:   There were no vitals  taken for this visit.    General: NAD. AAO.  HENT: No scleral icterus. Extraocular movements intact.  Neck: No JVD  Cardiovascular: Regular heart rate and rhythm. S1/S2 appreciated.  Respiratory: CTAB. No increased work of breathing.  Extremities: Warm. No edema.  Skin: no ulceration or wounds present    Labs & Imaging   Reviewed in clinic today    Assessment:   Luke Borden is a 68 y.o. y.o. male who presents today for follow-up regarding AT/AF s/p RF-PVI 2017 with a history of tachy-induced CM. EF normal 2022. ILR in place with no arrhythmias.Tolerating Toprol 50 mg QD. Not on OAC (CHADS-VASc 1).     1. PAF (paroxysmal atrial fibrillation)        2. SVT (supraventricular tachycardia)        3. Atrial tachycardia        4. Status post placement of implantable loop recorder        5. Premature ventricular contractions (PVCs) (VPCs)        6. History of cardiomyopathy        7. Hypercholesteremia        8. Obstructive sleep apnea syndrome          Plan:     Doing great.  Echo with color flow.  F/u with monitoring as scheduled, with me in one year.    Visit today included increased complexity associated with the care of the episodic problem atrial fibrillation addressed and managing the longitudinal care of the patient due to the serious and/or complex managed problem(s).

## 2024-06-03 ENCOUNTER — OFFICE VISIT (OUTPATIENT)
Dept: CARDIOLOGY | Facility: CLINIC | Age: 69
End: 2024-06-03
Payer: MEDICARE

## 2024-06-03 VITALS
HEART RATE: 66 BPM | BODY MASS INDEX: 26.84 KG/M2 | SYSTOLIC BLOOD PRESSURE: 140 MMHG | DIASTOLIC BLOOD PRESSURE: 80 MMHG | WEIGHT: 198.19 LBS | HEIGHT: 72 IN

## 2024-06-03 DIAGNOSIS — I47.19 ATRIAL TACHYCARDIA: Chronic | ICD-10-CM

## 2024-06-03 DIAGNOSIS — Z95.818 STATUS POST PLACEMENT OF IMPLANTABLE LOOP RECORDER: ICD-10-CM

## 2024-06-03 DIAGNOSIS — I49.3 PREMATURE VENTRICULAR CONTRACTIONS (PVCS) (VPCS): ICD-10-CM

## 2024-06-03 DIAGNOSIS — I48.0 PAF (PAROXYSMAL ATRIAL FIBRILLATION): Primary | ICD-10-CM

## 2024-06-03 DIAGNOSIS — G47.33 OBSTRUCTIVE SLEEP APNEA SYNDROME: ICD-10-CM

## 2024-06-03 DIAGNOSIS — E78.00 HYPERCHOLESTEREMIA: ICD-10-CM

## 2024-06-03 DIAGNOSIS — I47.10 SVT (SUPRAVENTRICULAR TACHYCARDIA): ICD-10-CM

## 2024-06-03 DIAGNOSIS — Z86.79 HISTORY OF CARDIOMYOPATHY: ICD-10-CM

## 2024-06-03 PROCEDURE — 99213 OFFICE O/P EST LOW 20 MIN: CPT | Mod: PBBFAC,PO | Performed by: INTERNAL MEDICINE

## 2024-06-03 PROCEDURE — 99215 OFFICE O/P EST HI 40 MIN: CPT | Mod: S$PBB,,, | Performed by: INTERNAL MEDICINE

## 2024-06-03 PROCEDURE — 99999 PR PBB SHADOW E&M-EST. PATIENT-LVL III: CPT | Mod: PBBFAC,,, | Performed by: INTERNAL MEDICINE

## 2024-06-03 PROCEDURE — G2211 COMPLEX E/M VISIT ADD ON: HCPCS | Mod: S$PBB,,, | Performed by: INTERNAL MEDICINE

## 2024-06-04 ENCOUNTER — TELEPHONE (OUTPATIENT)
Dept: CARDIOLOGY | Facility: HOSPITAL | Age: 69
End: 2024-06-04
Payer: MEDICARE

## 2024-06-04 NOTE — TELEPHONE ENCOUNTER
I had the pleasure of speaking with Mr. & Mrs. Borden over the phone regarding home monitoring of his loop and access to his reports.  He confirmed his reports are available in his portal and the results are viewable. I confirmed his device is connected and the clinic is receiving his monthly transmissions as scheduled.  He was curious why he received a call from an [facility] representative to send a manual transmission on 5/30 since his next scheduled transmission is not until 5/7. It was unclear as to why he received this request but I offered to look into it and I let him know that particular transmission showed his battery status to be okay, sinus rhythm, no afib noted.  We reviewed about the overall process of home monitoring of his loop recorder. I encouraged him to call either my direct line or the device clinic number if he has any questions in the future. Both he and his wife appreciated the call thanked me for taking the time address their questions.

## 2024-06-07 ENCOUNTER — CLINICAL SUPPORT (OUTPATIENT)
Dept: CARDIOLOGY | Facility: HOSPITAL | Age: 69
End: 2024-06-07
Attending: INTERNAL MEDICINE
Payer: MEDICARE

## 2024-06-07 DIAGNOSIS — Z95.818 PRESENCE OF OTHER CARDIAC IMPLANTS AND GRAFTS: ICD-10-CM

## 2024-06-07 PROCEDURE — 93298 REM INTERROG DEV EVAL SCRMS: CPT | Mod: 26,,, | Performed by: INTERNAL MEDICINE

## 2024-06-10 LAB
OHS CV AF BURDEN PERCENT: < 1
OHS CV DC REMOTE DEVICE TYPE: NORMAL

## 2024-06-12 ENCOUNTER — PATIENT MESSAGE (OUTPATIENT)
Dept: ELECTROPHYSIOLOGY | Facility: CLINIC | Age: 69
End: 2024-06-12
Payer: MEDICARE

## 2024-06-12 ENCOUNTER — HOSPITAL ENCOUNTER (OUTPATIENT)
Dept: CARDIOLOGY | Facility: HOSPITAL | Age: 69
Discharge: HOME | End: 2024-06-12
Attending: INTERNAL MEDICINE
Payer: MEDICARE

## 2024-06-12 VITALS — HEIGHT: 72 IN | WEIGHT: 198 LBS | BODY MASS INDEX: 26.82 KG/M2

## 2024-06-12 LAB
ASCENDING AORTA: 3.13 CM
AV INDEX (PROSTH): 0.66
AV MEAN GRADIENT: 5 MMHG
AV PEAK GRADIENT: 8 MMHG
AV REGURGITATION PRESSURE HALF TIME: 538.59 MS
AV VALVE AREA BY VELOCITY RATIO: 3.05 CM²
AV VALVE AREA: 2.61 CM²
AV VELOCITY RATIO: 0.77
BSA FOR ECHO PROCEDURE: 2.14 M2
CV ECHO LV RWT: 0.34 CM
DOP CALC AO PEAK VEL: 1.37 M/S
DOP CALC AO VTI: 36 CM
DOP CALC LVOT AREA: 4 CM2
DOP CALC LVOT DIAMETER: 2.25 CM
DOP CALC LVOT PEAK VEL: 1.05 M/S
DOP CALC LVOT STROKE VOLUME: 93.79 CM3
DOP CALCLVOT PEAK VEL VTI: 23.6 CM
E WAVE DECELERATION TIME: 266.89 MSEC
E/A RATIO: 1.24
E/E' RATIO: 4.33 M/S
ECHO LV POSTERIOR WALL: 0.89 CM (ref 0.6–1.1)
EJECTION FRACTION: 60 %
FRACTIONAL SHORTENING: 29 % (ref 28–44)
INTERVENTRICULAR SEPTUM: 0.85 CM (ref 0.6–1.1)
IVRT: 108.47 MSEC
LEFT ATRIUM SIZE: 4 CM
LEFT ATRIUM VOLUME INDEX MOD: 45.1 ML/M2
LEFT ATRIUM VOLUME MOD: 95.59 CM3
LEFT INTERNAL DIMENSION IN SYSTOLE: 3.72 CM (ref 2.1–4)
LEFT VENTRICLE DIASTOLIC VOLUME INDEX: 63.08 ML/M2
LEFT VENTRICLE DIASTOLIC VOLUME: 133.72 ML
LEFT VENTRICLE MASS INDEX: 78 G/M2
LEFT VENTRICLE SYSTOLIC VOLUME INDEX: 27.7 ML/M2
LEFT VENTRICLE SYSTOLIC VOLUME: 58.78 ML
LEFT VENTRICULAR INTERNAL DIMENSION IN DIASTOLE: 5.27 CM (ref 3.5–6)
LEFT VENTRICULAR MASS: 165.43 G
LV LATERAL E/E' RATIO: 3.71 M/S
LV SEPTAL E/E' RATIO: 5.2 M/S
LVOT MG: 2.21 MMHG
LVOT MV: 0.68 CM/S
MV PEAK A VEL: 0.42 M/S
MV PEAK E VEL: 0.52 M/S
MV STENOSIS PRESSURE HALF TIME: 77.4 MS
MV VALVE AREA P 1/2 METHOD: 2.84 CM2
OHS CV RV/LV RATIO: 0.93 CM
PISA AR MAX VEL: 4.82 M/S
PISA MRMAX VEL: 4.84 M/S
PISA TR MAX VEL: 2.46 M/S
PULM VEIN S/D RATIO: 0.81
PV PEAK D VEL: 0.47 M/S
PV PEAK S VEL: 0.38 M/S
RA PRESSURE ESTIMATED: 3 MMHG
RA VOL SYS: 74.51 ML
RIGHT ATRIAL AREA: 24.3 CM2
RIGHT VENTRICULAR END-DIASTOLIC DIMENSION: 4.92 CM
RIGHT VENTRICULAR LENGTH IN DIASTOLE (APICAL 4-CHAMBER VIEW): 8.2 CM
RV MID DIAMA: 3.17 CM
RV TB RVSP: 5 MMHG
RV TISSUE DOPPLER FREE WALL SYSTOLIC VELOCITY 1 (APICAL 4 CHAMBER VIEW): 18.58 CM/S
SINUS: 3.19 CM
STJ: 2.97 CM
TDI LATERAL: 0.14 M/S
TDI SEPTAL: 0.1 M/S
TDI: 0.12 M/S
TR MAX PG: 24 MMHG
TRICUSPID ANNULAR PLANE SYSTOLIC EXCURSION: 2.56 CM
TV REST PULMONARY ARTERY PRESSURE: 27 MMHG
Z-SCORE OF LEFT VENTRICULAR DIMENSION IN END DIASTOLE: -2.4
Z-SCORE OF LEFT VENTRICULAR DIMENSION IN END SYSTOLE: -0.75

## 2024-06-12 PROCEDURE — 93306 TTE W/DOPPLER COMPLETE: CPT | Mod: PO

## 2024-06-12 PROCEDURE — 93306 TTE W/DOPPLER COMPLETE: CPT | Mod: 26,,, | Performed by: INTERNAL MEDICINE

## 2024-07-16 ENCOUNTER — CLINICAL SUPPORT (OUTPATIENT)
Dept: CARDIOLOGY | Facility: HOSPITAL | Age: 69
End: 2024-07-16
Attending: INTERNAL MEDICINE
Payer: MEDICARE

## 2024-07-16 ENCOUNTER — CLINICAL SUPPORT (OUTPATIENT)
Dept: CARDIOLOGY | Facility: HOSPITAL | Age: 69
End: 2024-07-16
Payer: MEDICARE

## 2024-07-16 DIAGNOSIS — I48.0 PAROXYSMAL ATRIAL FIBRILLATION: ICD-10-CM

## 2024-07-16 DIAGNOSIS — I47.10 SUPRAVENTRICULAR TACHYCARDIA: ICD-10-CM

## 2024-07-16 PROCEDURE — 93298 REM INTERROG DEV EVAL SCRMS: CPT | Mod: 26,,, | Performed by: INTERNAL MEDICINE

## 2024-08-07 ENCOUNTER — TELEPHONE (OUTPATIENT)
Dept: CARDIOLOGY | Facility: HOSPITAL | Age: 69
End: 2024-08-07
Payer: MEDICARE

## 2024-08-07 ENCOUNTER — TELEPHONE (OUTPATIENT)
Dept: ELECTROPHYSIOLOGY | Facility: CLINIC | Age: 69
End: 2024-08-07
Payer: MEDICARE

## 2024-08-09 LAB
OHS CV AF BURDEN PERCENT: < 1
OHS CV DC REMOTE DEVICE TYPE: NORMAL

## 2024-08-20 ENCOUNTER — CLINICAL SUPPORT (OUTPATIENT)
Dept: CARDIOLOGY | Facility: HOSPITAL | Age: 69
End: 2024-08-20
Attending: INTERNAL MEDICINE
Payer: MEDICARE

## 2024-08-20 ENCOUNTER — CLINICAL SUPPORT (OUTPATIENT)
Dept: CARDIOLOGY | Facility: HOSPITAL | Age: 69
End: 2024-08-20
Payer: MEDICARE

## 2024-08-20 DIAGNOSIS — I47.10 SUPRAVENTRICULAR TACHYCARDIA: ICD-10-CM

## 2024-08-20 DIAGNOSIS — I48.0 PAROXYSMAL ATRIAL FIBRILLATION: ICD-10-CM

## 2024-08-28 LAB
OHS CV AF BURDEN PERCENT: < 1
OHS CV DC REMOTE DEVICE TYPE: NORMAL

## 2024-09-24 ENCOUNTER — CLINICAL SUPPORT (OUTPATIENT)
Dept: CARDIOLOGY | Facility: HOSPITAL | Age: 69
End: 2024-09-24
Attending: INTERNAL MEDICINE
Payer: MEDICARE

## 2024-09-24 ENCOUNTER — CLINICAL SUPPORT (OUTPATIENT)
Dept: CARDIOLOGY | Facility: HOSPITAL | Age: 69
End: 2024-09-24
Payer: MEDICARE

## 2024-09-24 DIAGNOSIS — I47.10 SUPRAVENTRICULAR TACHYCARDIA: ICD-10-CM

## 2024-09-24 DIAGNOSIS — I48.0 PAROXYSMAL ATRIAL FIBRILLATION: ICD-10-CM

## 2024-09-24 PROCEDURE — 93298 REM INTERROG DEV EVAL SCRMS: CPT | Mod: 26,,, | Performed by: INTERNAL MEDICINE

## 2024-10-15 LAB
OHS CV AF BURDEN PERCENT: < 1
OHS CV DC REMOTE DEVICE TYPE: NORMAL

## 2024-10-29 ENCOUNTER — CLINICAL SUPPORT (OUTPATIENT)
Dept: CARDIOLOGY | Facility: HOSPITAL | Age: 69
End: 2024-10-29
Payer: MEDICARE

## 2024-10-29 ENCOUNTER — CLINICAL SUPPORT (OUTPATIENT)
Dept: CARDIOLOGY | Facility: HOSPITAL | Age: 69
End: 2024-10-29
Attending: INTERNAL MEDICINE
Payer: MEDICARE

## 2024-10-29 DIAGNOSIS — I47.10 SUPRAVENTRICULAR TACHYCARDIA: ICD-10-CM

## 2024-10-29 DIAGNOSIS — I48.0 PAROXYSMAL ATRIAL FIBRILLATION: ICD-10-CM

## 2024-10-29 PROCEDURE — 93298 REM INTERROG DEV EVAL SCRMS: CPT | Mod: 26,,, | Performed by: INTERNAL MEDICINE

## 2024-10-31 LAB
OHS CV AF BURDEN PERCENT: < 1
OHS CV DC REMOTE DEVICE TYPE: NORMAL

## 2024-12-03 ENCOUNTER — CLINICAL SUPPORT (OUTPATIENT)
Dept: CARDIOLOGY | Facility: HOSPITAL | Age: 69
End: 2024-12-03

## 2024-12-03 ENCOUNTER — CLINICAL SUPPORT (OUTPATIENT)
Dept: CARDIOLOGY | Facility: HOSPITAL | Age: 69
End: 2024-12-03
Attending: INTERNAL MEDICINE
Payer: MEDICARE

## 2024-12-03 DIAGNOSIS — I48.0 PAROXYSMAL ATRIAL FIBRILLATION: ICD-10-CM

## 2024-12-03 DIAGNOSIS — I47.10 SUPRAVENTRICULAR TACHYCARDIA: ICD-10-CM

## 2024-12-03 PROCEDURE — 93298 REM INTERROG DEV EVAL SCRMS: CPT | Mod: 26,,, | Performed by: INTERNAL MEDICINE

## 2024-12-03 PROCEDURE — 93298 REM INTERROG DEV EVAL SCRMS: CPT | Performed by: INTERNAL MEDICINE

## 2025-01-07 ENCOUNTER — CLINICAL SUPPORT (OUTPATIENT)
Dept: CARDIOLOGY | Facility: HOSPITAL | Age: 70
End: 2025-01-07
Attending: INTERNAL MEDICINE
Payer: MEDICARE

## 2025-01-07 ENCOUNTER — CLINICAL SUPPORT (OUTPATIENT)
Dept: CARDIOLOGY | Facility: HOSPITAL | Age: 70
End: 2025-01-07
Payer: MEDICARE

## 2025-01-07 DIAGNOSIS — I47.10 SUPRAVENTRICULAR TACHYCARDIA: ICD-10-CM

## 2025-01-07 DIAGNOSIS — I48.0 PAROXYSMAL ATRIAL FIBRILLATION: ICD-10-CM

## 2025-01-07 PROCEDURE — 93298 REM INTERROG DEV EVAL SCRMS: CPT | Mod: 26,,, | Performed by: INTERNAL MEDICINE

## 2025-01-07 PROCEDURE — 93298 REM INTERROG DEV EVAL SCRMS: CPT | Performed by: INTERNAL MEDICINE

## 2025-01-10 DIAGNOSIS — I48.0 PAF (PAROXYSMAL ATRIAL FIBRILLATION): ICD-10-CM

## 2025-01-10 DIAGNOSIS — I49.3 PREMATURE VENTRICULAR CONTRACTIONS (PVCS) (VPCS): Primary | ICD-10-CM

## 2025-02-11 ENCOUNTER — CLINICAL SUPPORT (OUTPATIENT)
Dept: CARDIOLOGY | Facility: HOSPITAL | Age: 70
End: 2025-02-11
Attending: INTERNAL MEDICINE
Payer: MEDICARE

## 2025-02-11 ENCOUNTER — CLINICAL SUPPORT (OUTPATIENT)
Dept: CARDIOLOGY | Facility: HOSPITAL | Age: 70
End: 2025-02-11
Payer: MEDICARE

## 2025-02-11 DIAGNOSIS — I48.0 PAROXYSMAL ATRIAL FIBRILLATION: ICD-10-CM

## 2025-02-11 DIAGNOSIS — I47.10 SUPRAVENTRICULAR TACHYCARDIA: ICD-10-CM

## 2025-02-11 PROCEDURE — 93298 REM INTERROG DEV EVAL SCRMS: CPT | Mod: 26,,, | Performed by: INTERNAL MEDICINE

## 2025-02-11 PROCEDURE — 93298 REM INTERROG DEV EVAL SCRMS: CPT | Performed by: INTERNAL MEDICINE

## 2025-02-21 LAB
OHS CV AF BURDEN PERCENT: < 1
OHS CV DC REMOTE DEVICE TYPE: NORMAL

## 2025-03-18 ENCOUNTER — CLINICAL SUPPORT (OUTPATIENT)
Dept: CARDIOLOGY | Facility: HOSPITAL | Age: 70
End: 2025-03-18
Attending: INTERNAL MEDICINE
Payer: MEDICARE

## 2025-03-18 ENCOUNTER — CLINICAL SUPPORT (OUTPATIENT)
Dept: CARDIOLOGY | Facility: HOSPITAL | Age: 70
End: 2025-03-18
Payer: MEDICARE

## 2025-03-18 DIAGNOSIS — I48.0 PAROXYSMAL ATRIAL FIBRILLATION: ICD-10-CM

## 2025-03-18 DIAGNOSIS — I47.10 SUPRAVENTRICULAR TACHYCARDIA: ICD-10-CM

## 2025-03-18 PROCEDURE — 93298 REM INTERROG DEV EVAL SCRMS: CPT | Performed by: INTERNAL MEDICINE

## 2025-03-18 PROCEDURE — 93298 REM INTERROG DEV EVAL SCRMS: CPT | Mod: 26,,, | Performed by: INTERNAL MEDICINE

## 2025-03-21 LAB
OHS CV AF BURDEN PERCENT: < 1
OHS CV DC REMOTE DEVICE TYPE: NORMAL

## 2025-04-22 ENCOUNTER — CLINICAL SUPPORT (OUTPATIENT)
Dept: CARDIOLOGY | Facility: HOSPITAL | Age: 70
End: 2025-04-22
Payer: MEDICARE

## 2025-04-22 ENCOUNTER — CLINICAL SUPPORT (OUTPATIENT)
Dept: CARDIOLOGY | Facility: HOSPITAL | Age: 70
End: 2025-04-22
Attending: INTERNAL MEDICINE
Payer: MEDICARE

## 2025-04-22 DIAGNOSIS — I48.0 PAROXYSMAL ATRIAL FIBRILLATION: ICD-10-CM

## 2025-04-22 DIAGNOSIS — I47.10 SUPRAVENTRICULAR TACHYCARDIA: ICD-10-CM

## 2025-04-22 PROCEDURE — 93298 REM INTERROG DEV EVAL SCRMS: CPT | Performed by: INTERNAL MEDICINE

## 2025-05-19 LAB
OHS CV AF BURDEN PERCENT: < 1
OHS CV DC REMOTE DEVICE TYPE: NORMAL

## 2025-05-22 NOTE — PROGRESS NOTES
"Subjective   Patient ID:  Luke Borden is a 69 y.o. male who presents for follow-up of Atrial Fibrillation      HPI yo male with NICM (rate related, resolved), PAC's, PVC's, nonsustained atrial tachycardia, atrial fibrillation, Sleep apnea (on CPAP).     Background:  Primary Cardiologist is Dr. Coleman.  Retired, [employer] .  Presented to PCP for routine visit, noted to have irregular heart beat.  Work-up revealed ectopy and cardiomyopathy.  Echo 2/16/17 EF 30-35%  Holter 2/17/17 2004 PVC's, frequent PAC's and "frequent runs of AT/AF."  Placed on beta blocker and ace inhibitor.  Echo 4/6/17 EF 30% LVEDD 6.2 cm, ALEXIS 28, moderate TR.  48 holter 4/6/17 1605 PVC's, 2431 PAC's frequent 3-20 beat runs of atrial tachycardia.  LHC 4/24/17 normal coronary arteries.  Placed on Sotalol >> Continued to have frequent ectopy.  Holter 8/3/17 2065 PAC's with nearly incessant runs in nonsustained atrial tachycardia, 5492 PVC's  Echo 9/22/17 EF 35-40%     PVI (RFA) 10/31/17.  Has noted symptomatic benefit since then.  Holter 2/26/18 NSR with 23 PVC's, 31 PAC's, no AT/AF  Echo 2/26/18 EF 40-45%  We discontinued Sotalol and initiated Toprol.  Echo 10/29/19 normal biventricular structure and function ALEXIS 35  48 hr holter 10/29/18 nsr with 158 PAC's + 1 4 beat run of nonsustained AT     ILR implanted 11/21/19. Reveals no significant arrhythmias to date.  Echo 2/22/22 EF 55% moderate LAE      ILR removal and reimplant (9/2023)     Update:  Feeling well overall. Denies palpitations. Has been limited as he is recovering from robotic inguinal hernia repair. Has resumed moderate exercise.  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  ILR reveals no significant arrhythmias.    Review of Systems   Constitutional: Negative. Negative for fever and malaise/fatigue.   HENT:  Negative for congestion and sore throat.    Cardiovascular:  Negative for chest pain, dyspnea on exertion, irregular heartbeat, leg swelling, " near-syncope, orthopnea, palpitations, paroxysmal nocturnal dyspnea and syncope.   Respiratory:  Negative for cough and shortness of breath.    Gastrointestinal:  Negative for abdominal pain, constipation and diarrhea.   Neurological:  Negative for dizziness, light-headedness and weakness.   Psychiatric/Behavioral:  Negative for depression. The patient is not nervous/anxious.    All other systems reviewed and are negative.         Objective     Physical Exam  Constitutional:       Appearance: He is well-developed.   Eyes:      General: No scleral icterus.     Conjunctiva/sclera: Conjunctivae normal.   Neck:      Vascular: No JVD.      Trachea: No tracheal deviation.   Cardiovascular:      Rate and Rhythm: Normal rate and regular rhythm.      Chest Wall: PMI is not displaced.      Heart sounds: Normal heart sounds.   Pulmonary:      Effort: Pulmonary effort is normal. No respiratory distress.      Breath sounds: Normal breath sounds.   Abdominal:      Palpations: Abdomen is soft.      Tenderness: There is no abdominal tenderness.   Musculoskeletal:         General: No tenderness.   Skin:     General: Skin is warm and dry.      Findings: No rash.   Neurological:      Mental Status: He is alert and oriented to person, place, and time.   Psychiatric:         Behavior: Behavior normal.            Assessment and Plan     1. PAF (paroxysmal atrial fibrillation)    2. SVT (supraventricular tachycardia)    3. Premature ventricular contractions (PVCs) (VPCs)    4. History of cardiomyopathy    5. Obstructive sleep apnea syndrome        Plan:    Doing well, no symptomatic recurrence.  Continue current therapy.  F/u with monitoring as scheduled, with me in one year.

## 2025-05-26 ENCOUNTER — OFFICE VISIT (OUTPATIENT)
Dept: ELECTROPHYSIOLOGY | Facility: CLINIC | Age: 70
End: 2025-05-26
Payer: MEDICARE

## 2025-05-26 VITALS
SYSTOLIC BLOOD PRESSURE: 126 MMHG | HEART RATE: 58 BPM | DIASTOLIC BLOOD PRESSURE: 70 MMHG | HEIGHT: 72 IN | BODY MASS INDEX: 27.06 KG/M2 | WEIGHT: 199.75 LBS

## 2025-05-26 DIAGNOSIS — G47.33 OBSTRUCTIVE SLEEP APNEA SYNDROME: ICD-10-CM

## 2025-05-26 DIAGNOSIS — I48.0 PAF (PAROXYSMAL ATRIAL FIBRILLATION): Primary | ICD-10-CM

## 2025-05-26 DIAGNOSIS — Z86.79 HISTORY OF CARDIOMYOPATHY: ICD-10-CM

## 2025-05-26 DIAGNOSIS — I47.10 SVT (SUPRAVENTRICULAR TACHYCARDIA): ICD-10-CM

## 2025-05-26 DIAGNOSIS — I49.3 PREMATURE VENTRICULAR CONTRACTIONS (PVCS) (VPCS): ICD-10-CM

## 2025-05-26 PROCEDURE — 99213 OFFICE O/P EST LOW 20 MIN: CPT | Mod: PBBFAC | Performed by: INTERNAL MEDICINE

## 2025-05-26 PROCEDURE — 99214 OFFICE O/P EST MOD 30 MIN: CPT | Mod: S$PBB,,, | Performed by: INTERNAL MEDICINE

## 2025-05-26 PROCEDURE — 99999 PR PBB SHADOW E&M-EST. PATIENT-LVL III: CPT | Mod: PBBFAC,,, | Performed by: INTERNAL MEDICINE

## 2025-05-27 ENCOUNTER — CLINICAL SUPPORT (OUTPATIENT)
Dept: CARDIOLOGY | Facility: HOSPITAL | Age: 70
End: 2025-05-27
Payer: MEDICARE

## 2025-05-27 ENCOUNTER — CLINICAL SUPPORT (OUTPATIENT)
Dept: CARDIOLOGY | Facility: HOSPITAL | Age: 70
End: 2025-05-27
Attending: INTERNAL MEDICINE
Payer: MEDICARE

## 2025-05-27 DIAGNOSIS — I48.0 PAROXYSMAL ATRIAL FIBRILLATION: ICD-10-CM

## 2025-05-27 DIAGNOSIS — I47.10 SUPRAVENTRICULAR TACHYCARDIA: ICD-10-CM

## 2025-05-27 PROCEDURE — 93298 REM INTERROG DEV EVAL SCRMS: CPT | Performed by: INTERNAL MEDICINE

## 2025-06-16 LAB
OHS CV AF BURDEN PERCENT: < 1
OHS CV DC REMOTE DEVICE TYPE: NORMAL

## 2025-07-01 ENCOUNTER — CLINICAL SUPPORT (OUTPATIENT)
Dept: CARDIOLOGY | Facility: HOSPITAL | Age: 70
End: 2025-07-01

## 2025-07-01 ENCOUNTER — CLINICAL SUPPORT (OUTPATIENT)
Dept: CARDIOLOGY | Facility: HOSPITAL | Age: 70
End: 2025-07-01
Attending: INTERNAL MEDICINE
Payer: MEDICARE

## 2025-07-01 DIAGNOSIS — I48.0 PAROXYSMAL ATRIAL FIBRILLATION: ICD-10-CM

## 2025-07-01 DIAGNOSIS — I47.10 SUPRAVENTRICULAR TACHYCARDIA: ICD-10-CM

## 2025-07-01 PROCEDURE — 93298 REM INTERROG DEV EVAL SCRMS: CPT | Performed by: INTERNAL MEDICINE

## 2025-07-01 PROCEDURE — 93298 REM INTERROG DEV EVAL SCRMS: CPT | Mod: 26,,, | Performed by: INTERNAL MEDICINE

## 2025-07-16 LAB
OHS CV AF BURDEN PERCENT: < 1
OHS CV DC REMOTE DEVICE TYPE: NORMAL

## 2025-07-28 NOTE — TELEPHONE ENCOUNTER
----- Message from Florencio Carter sent at 1/14/2020  4:51 PM CST -----  Type:  Patient Returning Call    Who Called:  Patient  Who Left Message for Patient:  April  Does the patient know what this is regarding?:  NA  Best Call Back Number:  [phone]  Additional Information:       Left Voicemail (1st Attempt) for the patient to call back and schedule the following:    Appointment type: Rtn vascular neurosurg - in person or phone  Provider: Dr. 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

## 2025-08-05 ENCOUNTER — CLINICAL SUPPORT (OUTPATIENT)
Dept: CARDIOLOGY | Facility: HOSPITAL | Age: 70
End: 2025-08-05
Attending: INTERNAL MEDICINE
Payer: MEDICARE

## 2025-08-05 ENCOUNTER — CLINICAL SUPPORT (OUTPATIENT)
Dept: CARDIOLOGY | Facility: HOSPITAL | Age: 70
End: 2025-08-05
Payer: MEDICARE

## 2025-08-05 DIAGNOSIS — I48.0 PAROXYSMAL ATRIAL FIBRILLATION: ICD-10-CM

## 2025-08-05 DIAGNOSIS — I47.10 SUPRAVENTRICULAR TACHYCARDIA: ICD-10-CM

## 2025-08-06 LAB
OHS CV AF BURDEN PERCENT: < 1
OHS CV DC REMOTE DEVICE TYPE: NORMAL

## (undated) DEVICE — ADHESIVE DERMABOND ADVANCED

## (undated) DEVICE — DRAPE OPTIMA MAJOR PEDIATRIC

## (undated) DEVICE — ELECTRODE REM PLYHSV RETURN 9

## (undated) DEVICE — DRAPE PED LAP SURG 108X77IN

## (undated) DEVICE — PACK PACER PERMANENT OMC

## (undated) DEVICE — DRESSING ADH COVADERM PLUS 4X4